# Patient Record
Sex: FEMALE | Race: WHITE | Employment: UNEMPLOYED | URBAN - METROPOLITAN AREA
[De-identification: names, ages, dates, MRNs, and addresses within clinical notes are randomized per-mention and may not be internally consistent; named-entity substitution may affect disease eponyms.]

---

## 2018-10-08 ENCOUNTER — OFFICE VISIT (OUTPATIENT)
Dept: FAMILY MEDICINE CLINIC | Age: 28
End: 2018-10-08

## 2018-10-08 VITALS
DIASTOLIC BLOOD PRESSURE: 62 MMHG | HEIGHT: 65 IN | RESPIRATION RATE: 16 BRPM | WEIGHT: 189 LBS | SYSTOLIC BLOOD PRESSURE: 118 MMHG | BODY MASS INDEX: 31.49 KG/M2 | HEART RATE: 68 BPM | OXYGEN SATURATION: 98 % | TEMPERATURE: 98.5 F

## 2018-10-08 DIAGNOSIS — F33.0 MILD EPISODE OF RECURRENT MAJOR DEPRESSIVE DISORDER (HCC): ICD-10-CM

## 2018-10-08 DIAGNOSIS — Z00.00 ROUTINE GENERAL MEDICAL EXAMINATION AT A HEALTH CARE FACILITY: Primary | ICD-10-CM

## 2018-10-08 DIAGNOSIS — Z13.220 SCREENING, LIPID: ICD-10-CM

## 2018-10-08 DIAGNOSIS — E66.9 OBESITY (BMI 30-39.9): ICD-10-CM

## 2018-10-08 NOTE — PATIENT INSTRUCTIONS

## 2018-10-08 NOTE — MR AVS SNAPSHOT
[address] [facility] 
 
 
 [address] [facility] [address] 
[phone] Patient: Lucio Asif MRN: [id number] [id number] Visit Information Date & Time Provider Department Dept. Phone Encounter #  
 10/8/2018  9:00 AM Alia Taylor  [facility] [phone] [id number] Follow-up Instructions Return in about 1 year (around 10/8/2019) for Complete Physical.  
  
Upcoming Health Maintenance Date Due DTaP/Tdap/Td series (1 - Tdap) 3/8/2011 PAP AKA CERVICAL CYTOLOGY 3/8/2011 Influenza Age 5 to Adult 8/1/2018 Allergies as of 10/8/2018  Review Complete On: 10/8/2018 By: Alia Taylor NP Severity Noted Reaction Type Reactions Ciprofloxacin Low 10/08/2018    Rash Pcn [Penicillins] Low 10/08/2018    Itching Current Immunizations  Reviewed on 10/8/2018 Name Date Influenza Vaccine 10/1/2018 Reviewed by Alia Taylor NP on 10/8/2018 at  9:45 AM  
You Were Diagnosed With   
  
 Codes Comments Routine general medical examination at a health care facility    -  Primary ICD-10-CM: Z00.00 ICD-9-CM: V70.0 Screening, lipid     ICD-10-CM: V91.945 ICD-9-CM: V77.91 Vitals BP Pulse Temp Resp Height(growth percentile) Weight(growth percentile)  
 118/62 (BP 1 Location: Left arm, BP Patient Position: Sitting) 68 98.5 °F (36.9 °C) (Oral) 16 5' 5\" (1.651 m) 189 lb (85.7 kg) LMP SpO2 BMI OB Status Smoking Status 09/20/2018 (Exact Date) 98% 31.45 kg/m2 Having regular periods Former Smoker BMI and BSA Data Body Mass Index Body Surface Area  
 31.45 kg/m 2 1.98 m 2 Your Updated Medication List  
  
   
This list is accurate as of 10/8/18  9:52 AM.  Always use your most recent med list.  
  
  
  
  
 CENTRUM PRONUTRIENTS OMEGA-3 332. 5-100-200 mg Cap Generic drug:  omega-3s-dha-epa-fish oil Take  by mouth. PRENATAL #2 PO Take  by mouth. We Performed the Following CBC WITH AUTOMATED DIFF [11654 CPT(R)] LIPID PANEL [39381 CPT(R)] METABOLIC PANEL, COMPREHENSIVE [94393 CPT(R)] Follow-up Instructions Return in about 1 year (around 10/8/2019) for Complete Physical.  
  
  
Patient Instructions Well Visit, Ages 25 to 48: Care Instructions Your Care Instructions Physical exams can help you stay healthy. Your doctor has checked your overall health and may have suggested ways to take good care of yourself. He or she also may have recommended tests. At home, you can help prevent illness with healthy eating, regular exercise, and other steps. Follow-up care is a key part of your treatment and safety. Be sure to make and go to all appointments, and call your doctor if you are having problems. It's also a good idea to know your test results and keep a list of the medicines you take. How can you care for yourself at home? · Reach and stay at a healthy weight. This will lower your risk for many problems, such as obesity, diabetes, heart disease, and high blood pressure. · Get at least 30 minutes of physical activity on most days of the week. Walking is a good choice. You also may want to do other activities, such as running, swimming, cycling, or playing tennis or team sports. Discuss any changes in your exercise program with your doctor. · Do not smoke or allow others to smoke around you. If you need help quitting, talk to your doctor about stop-smoking programs and medicines. These can increase your chances of quitting for good. · Talk to your doctor about whether you have any risk factors for sexually transmitted infections (STIs). Having one sex partner (who does not have STIs and does not have sex with anyone else) is a good way to avoid these infections. · Use birth control if you do not want to have children at this time. Talk with your doctor about the choices available and what might be best for you. · Protect your skin from too much sun. When you're outdoors from 10 a.m. to 4 p.m., stay in the shade or cover up with clothing and a hat with a wide brim. Wear sunglasses that block UV rays. Even when it's cloudy, put broad-spectrum sunscreen (SPF 30 or higher) on any exposed skin. · See a dentist one or two times a year for checkups and to have your teeth cleaned. · Wear a seat belt in the car. · Drink alcohol in moderation, if at all. That means no more than 2 drinks a day for men and 1 drink a day for women. Follow your doctor's advice about when to have certain tests. These tests can spot problems early. For everyone · Cholesterol. Have the fat (cholesterol) in your blood tested after age 21. Your doctor will tell you how often to have this done based on your age, family history, or other things that can increase your risk for heart disease. · Blood pressure. Have your blood pressure checked during a routine doctor visit. Your doctor will tell you how often to check your blood pressure based on your age, your blood pressure results, and other factors. · Vision. Talk with your doctor about how often to have a glaucoma test. 
· Diabetes. Ask your doctor whether you should have tests for diabetes. · Colon cancer. Have a test for colon cancer at age 48. You may have one of several tests. If you are younger than 48, you may need a test earlier if you have any risk factors. Risk factors include whether you already had a precancerous polyp removed from your colon or whether your parent, brother, sister, or child has had colon cancer. For women · Breast exam and mammogram. Talk to your doctor about when you should have a clinical breast exam and a mammogram. Medical experts differ on whether and how often women under 50 should have these tests. Your doctor can help you decide what is right for you. · Pap test and pelvic exam. Begin Pap tests at age 24.  A Pap test is the best way to find cervical cancer. The test often is part of a pelvic exam. Ask how often to have this test. 
· Tests for sexually transmitted infections (STIs). Ask whether you should have tests for STIs. You may be at risk if you have sex with more than one person, especially if your partners do not wear condoms. For men · Tests for sexually transmitted infections (STIs). Ask whether you should have tests for STIs. You may be at risk if you have sex with more than one person, especially if you do not wear a condom. · Testicular cancer exam. Ask your doctor whether you should check your testicles regularly. · Prostate exam. Talk to your doctor about whether you should have a blood test (called a PSA test) for prostate cancer. Experts differ on whether and when men should have this test. Some experts suggest it if you are older than 39 and are -American or have a father or brother who got prostate cancer when he was younger than 72. When should you call for help? Watch closely for changes in your health, and be sure to contact your doctor if you have any problems or symptoms that concern you. Where can you learn more? Go to http://haven-aliza.info/. Enter P072 in the search box to learn more about \"Well Visit, Ages 25 to 48: Care Instructions. \" Current as of: March 29, 2018 Content Version: 11.8 © 0886-5221 Healthwise, Incorporated. Care instructions adapted under license by [employer] (which disclaims liability or warranty for this information). If you have questions about a medical condition or this instruction, always ask your healthcare professional. [place] [address] any warranty or liability for your use of this information. Introducing [facility] [place] [facility]! [facility] introduces Hopela patient portal. Now you can access parts of your medical record, email your doctor's office, and request medication refills online. 1. In your internet browser, go to [email or web address]://[email or web address] [email or web address][employer]t 2. Click on the First Time User? Click Here link in the Sign In box. You will see the New Member Sign Up page. 3. Enter your Widbook Access Code exactly as it appears below. You will not need to use this code after youve completed the sign-up process. If you do not sign up before the expiration date, you must request a new code. · Widbook Access Code: [email or web address] Expires: 1/6/2019  9:09 AM 
 
4. Enter the last four digits of your Social Security Number (xxxx) and Date of Birth (mm/dd/yyyy) as indicated and click Submit. You will be taken to the next sign-up page. 5. Create a Dittot ID. This will be your Widbook login ID and cannot be changed, so think of one that is secure and easy to remember. 6. Create a Widbook password. You can change your password at any time. 7. Enter your Password Reset Question and Answer. This can be used at a later time if you forget your password. 8. Enter your e-mail address. You will receive e-mail notification when new information is available in [address] 9. Click Sign Up. You can now view and download portions of your medical record. 10. Click the Download Summary menu link to download a portable copy of your medical information. If you have questions, please visit the Frequently Asked Questions section of the Widbook website. Remember, Widbook is NOT to be used for urgent needs. For medical emergencies, dial 911. Now available from your iPhone and Android! Please provide this summary of care documentation to your next provider. Your primary care clinician is listed as Sarita Amezquita. If you have any questions after today's visit, please call [phone].

## 2018-10-08 NOTE — PROGRESS NOTES
Assessment/ Plan:  
Full age appropriate History and Physical exam as well as health care maintenance  performed and discussed today. Risk factor modification discussed today includes safe sex practices, healthy diet and exercise, and seat belt use. Continue current medications. Diagnoses and all orders for this visit: 1. Routine general medical examination at a health care facility 
-     CBC WITH AUTOMATED DIFF 
-     METABOLIC PANEL, COMPREHENSIVE She is up to date on routine care. She will complete fasting labs. 2. Screening, lipid -     LIPID PANEL 3. Mild episode of recurrent major depressive disorder ([address] Currently coping and plans to become pregnant soon. Continue to encourage counseling. She is agreeable and will return if symptoms worsen. 4. Obesity (BMI 30-39. 9) I have reviewed/discussed the above normal BMI with the patient. I have recommended the following interventions: dietary management education, guidance, and counseling, encourage exercise, monitor weight and prescribed dietary intake. Given written instructions as well. Follow-up Disposition: 
Return in about 1 year (around 10/8/2019) for Complete Physical.  
 
Discussed expected course/resolution/complications of diagnosis in detail with patient.   
Medication risks/benefits/costs/interactions/alternatives discussed with patient.   
Pt was given after visit summary which includes diagnoses, current medications & vitals. Pt expressed understanding with the diagnosis and plan HPI:  
Jcarlos Marti is a 29 y.o. female who presents for annual exam. 
 
Reports intermittent chest pain long-standing. Not associated with shortness of breath. Previous ER visit was negative for cardiac etiology. Thought to be anxiety related. Not currently undergoing cardiology follow up or treatment for anxiety. Depression Patient complains of depression.  She complains of depressed mood, fatigue, feelings of worthlessness/guilt, difficulty concentrating and hopelessness. Onset was approximately 5 years ago, gradually worsening since that time. She denies current suicidal and homicidal plan or intent. Family history significant for nothing. Possible organic causes contributing are: none. Risk factors: negative life event recently found out mother used sperm donor for her birth. Previous treatment includes Zoloft, Lexapro, Viibyrd (worked the best) and individual therapy. She complains of the following side effects from the treatment: none. Not interested in restarting therapy for mood as she is attempting to become pregnant at this time. She is followed by Dr. Jaylan Sinha, ob/gyn and is up to date on routine pap smears. Current Outpatient Prescriptions Medication Sig Dispense Refill  omega-3s-dha-epa-fish oil (CENTRUM PRONUTRIGloss48 OMEGA-3) 332.5-100-200 mg cap Take  by mouth.  prenatal vit calc,iron,folic (PRENATAL #2 PO) Take  by mouth. Allergies Allergen Reactions  Ciprofloxacin Rash  Pcn [Penicillins] Itching Patient Active Problem List  
Diagnosis Code  Mild episode of recurrent major depressive disorder ([facility] [address] F33.0  Obesity (BMI 30-39. 9) E66.9 No past medical history on file. Past Surgical History:  
Procedure Laterality Date  HX OTHER SURGICAL  01/2017  
 gallstones removal  
  
Patient's last menstrual period was 09/20/2018 (exact date). Family History Problem Relation Age of Onset  Depression Mother  Depression Brother Social History Social History  Marital status:  Spouse name: N/A  
 Number of children: N/A  
 Years of education: N/A Occupational History  Not on file. Social History Main Topics  Smoking status: Former Smoker Years: 5.00 Quit date: 06/2016  Smokeless tobacco: Never Used  Alcohol use Yes Comment: 3 weekly  Drug use: Yes Special: Marijuana Comment: years ago  Sexual activity: Yes  
  Partners: Male Birth control/ protection: None Other Topics Concern  Not on file Social History Narrative Parents are . Found out late in life mother used donor sperm for pregnancy. ROS:  
 
Review of Systems Constitutional: Negative for fever, malaise/fatigue and weight loss. HENT: Negative for hearing loss. Eyes: Negative for blurred vision and pain. Respiratory: Negative for cough and shortness of breath. Cardiovascular: Negative for chest pain, palpitations and leg swelling. Gastrointestinal: Negative for abdominal pain, blood in stool, constipation, diarrhea and melena. Genitourinary: Negative for dysuria and hematuria. Musculoskeletal: Negative for joint pain. Skin: Negative for rash. Neurological: Negative for headaches. Psychiatric/Behavioral: Positive for depression. The patient is nervous/anxious. The patient does not have insomnia. Physical Exam:  
 
Visit Vitals  /62 (BP 1 Location: Left arm, BP Patient Position: Sitting)  Pulse 68  Temp 98.5 °F (36.9 °C) (Oral)  Resp 16  
 Ht 5' 5\" (1.651 m)  Wt 189 lb (85.7 kg)  LMP 09/20/2018 (Exact Date)  SpO2 98%  BMI 31.45 kg/m2 Nursing Documentation and Vital Signs Reviewed. Physical Exam  
Constitutional: No distress. HENT:  
Right Ear: No drainage. Tympanic membrane is not injected, not erythematous and not bulging. No middle ear effusion. Left Ear: No drainage. Tympanic membrane is not injected, not erythematous and not bulging. No middle ear effusion. Nose: No mucosal edema or rhinorrhea. Mouth/Throat: Normal dentition. No oropharyngeal exudate, posterior oropharyngeal erythema or tonsillar abscesses. Eyes: Pupils are equal, round, and reactive to light. Neck: No JVD present. Carotid bruit is not present. No tracheal deviation present. No thyroid mass and no thyromegaly present. Cardiovascular: S1 normal and S2 normal.  Exam reveals no gallop and no friction rub. No murmur heard. Pulses: 
     Dorsalis pedis pulses are 2+ on the right side, and 2+ on the left side. Posterior tibial pulses are 2+ on the right side, and 2+ on the left side. Pulmonary/Chest: Breath sounds normal. She has no wheezes. Abdominal: Soft. Bowel sounds are normal. She exhibits no distension and no mass. There is no hepatosplenomegaly. There is no tenderness. Lymphadenopathy:  
  She has no cervical adenopathy. She has no axillary adenopathy. Neurological: She has normal motor skills, normal sensation and normal strength. No cranial nerve deficit. Gait normal.  
Skin: Skin is warm and dry.   
Psychiatric: Mood, memory, affect and judgment normal.

## 2018-10-08 NOTE — PROGRESS NOTES
Chief Complaint Patient presents with [facility]  Complete Physical  
 
1. Have you been to the ER, urgent care clinic since your last visit? Hospitalized since your last visit? No 
 
2. Have you seen or consulted any other health care providers outside of the [address] [place] [address] since your last visit? Include any pap smears or colon screening.  No

## 2018-10-09 LAB
ALBUMIN SERPL-MCNC: 4.3 G/DL (ref 3.5–5.5)
ALBUMIN/GLOB SERPL: 1.4 {RATIO} (ref 1.2–2.2)
ALP SERPL-CCNC: 57 IU/L (ref 39–117)
ALT SERPL-CCNC: 12 IU/L (ref 0–32)
AST SERPL-CCNC: 16 IU/L (ref 0–40)
BASOPHILS # BLD AUTO: 0 X10E3/UL (ref 0–0.2)
BASOPHILS NFR BLD AUTO: 0 %
BILIRUB SERPL-MCNC: 0.2 MG/DL (ref 0–1.2)
BUN SERPL-MCNC: 14 MG/DL (ref 6–20)
BUN/CREAT SERPL: 22 (ref 9–23)
CALCIUM SERPL-MCNC: 8.9 MG/DL (ref 8.7–10.2)
CHLORIDE SERPL-SCNC: 104 MMOL/L (ref 96–106)
CHOLEST SERPL-MCNC: 152 MG/DL (ref 100–199)
CO2 SERPL-SCNC: 22 MMOL/L (ref 20–29)
CREAT SERPL-MCNC: 0.65 MG/DL (ref 0.57–1)
EOSINOPHIL # BLD AUTO: 0.1 X10E3/UL (ref 0–0.4)
EOSINOPHIL NFR BLD AUTO: 1 %
ERYTHROCYTE [DISTWIDTH] IN BLOOD BY AUTOMATED COUNT: 13 % (ref 12.3–15.4)
GLOBULIN SER CALC-MCNC: 3.1 G/DL (ref 1.5–4.5)
GLUCOSE SERPL-MCNC: 83 MG/DL (ref 65–99)
HCT VFR BLD AUTO: 39.1 % (ref 34–46.6)
HDLC SERPL-MCNC: 55 MG/DL
HGB BLD-MCNC: 13.5 G/DL (ref 11.1–15.9)
IMM GRANULOCYTES # BLD: 0 X10E3/UL (ref 0–0.1)
IMM GRANULOCYTES NFR BLD: 0 %
INTERPRETATION, 910389: NORMAL
LDLC SERPL CALC-MCNC: 91 MG/DL (ref 0–99)
LYMPHOCYTES # BLD AUTO: 2.1 X10E3/UL (ref 0.7–3.1)
LYMPHOCYTES NFR BLD AUTO: 33 %
MCH RBC QN AUTO: 31.2 PG (ref 26.6–33)
MCHC RBC AUTO-ENTMCNC: 34.5 G/DL (ref 31.5–35.7)
MCV RBC AUTO: 90 FL (ref 79–97)
MONOCYTES # BLD AUTO: 0.6 X10E3/UL (ref 0.1–0.9)
MONOCYTES NFR BLD AUTO: 9 %
NEUTROPHILS # BLD AUTO: 3.5 X10E3/UL (ref 1.4–7)
NEUTROPHILS NFR BLD AUTO: 57 %
PLATELET # BLD AUTO: 226 X10E3/UL (ref 150–379)
POTASSIUM SERPL-SCNC: 4.8 MMOL/L (ref 3.5–5.2)
PROT SERPL-MCNC: 7.4 G/DL (ref 6–8.5)
RBC # BLD AUTO: 4.33 X10E6/UL (ref 3.77–5.28)
SODIUM SERPL-SCNC: 140 MMOL/L (ref 134–144)
TRIGL SERPL-MCNC: 31 MG/DL (ref 0–149)
VLDLC SERPL CALC-MCNC: 6 MG/DL (ref 5–40)
WBC # BLD AUTO: 6.4 X10E3/UL (ref 3.4–10.8)

## 2018-10-24 LAB — PAP SMEAR, EXTERNAL: NORMAL

## 2018-11-17 LAB
T. PALLIDUM, EXTERNAL: NEGATIVE
URINALYSIS, EXTERNAL: NEGATIVE

## 2018-12-17 LAB
ANTIBODY SCREEN, EXTERNAL: NEGATIVE
CHLAMYDIA, EXTERNAL: NEGATIVE
HBSAG, EXTERNAL: NEGATIVE
HCT, EXTERNAL: 38.7
HGB, EXTERNAL: 12.9
HIV, EXTERNAL: NORMAL
N. GONORRHEA, EXTERNAL: NEGATIVE
PLATELET CNT,   EXTERNAL: 255
RUBELLA, EXTERNAL: NORMAL
TYPE, ABO & RH, EXTERNAL: NORMAL

## 2019-02-01 LAB — AFPT, MATERNAL, EXTERNAL: NEGATIVE

## 2019-04-01 ENCOUNTER — TELEPHONE (OUTPATIENT)
Dept: OBGYN CLINIC | Age: 29
End: 2019-04-01

## 2019-04-01 NOTE — TELEPHONE ENCOUNTER
Patient of [facility] transfer.   Patient would like to speak with the nurse of [facility] to discuss what to expect at her 24-25 week visit with DT.    (FYI- I do not see records from former OB as noted in scheduling notes that they were being faxed)

## 2019-04-01 NOTE — TELEPHONE ENCOUNTER
Spoke with patient regarding upcoming appointment on 4/8. Inquiring if GDS will be done. Advised patient it can be done anywhere from 24-28 wks but we will likely do at following visit. Patient verbalized understanding.

## 2019-04-04 ENCOUNTER — HOSPITAL ENCOUNTER (EMERGENCY)
Age: 29
Discharge: HOME | End: 2019-04-04
Attending: EMERGENCY MEDICINE
Payer: COMMERCIAL

## 2019-04-04 VITALS
RESPIRATION RATE: 17 BRPM | BODY MASS INDEX: 33.72 KG/M2 | OXYGEN SATURATION: 97 % | WEIGHT: 202.38 LBS | DIASTOLIC BLOOD PRESSURE: 56 MMHG | TEMPERATURE: 97.9 F | HEIGHT: 65 IN | SYSTOLIC BLOOD PRESSURE: 108 MMHG | HEART RATE: 79 BPM

## 2019-04-04 DIAGNOSIS — E86.0 MILD DEHYDRATION: Primary | ICD-10-CM

## 2019-04-04 DIAGNOSIS — O26.892 PREGNANCY HEADACHE IN SECOND TRIMESTER: ICD-10-CM

## 2019-04-04 DIAGNOSIS — R51.9 PREGNANCY HEADACHE IN SECOND TRIMESTER: ICD-10-CM

## 2019-04-04 LAB
ALBUMIN SERPL-MCNC: 3 G/DL (ref 3.5–5)
ALBUMIN/GLOB SERPL: 0.7 {RATIO} (ref 1.1–2.2)
ALP SERPL-CCNC: 65 U/L (ref 45–117)
ALT SERPL-CCNC: 17 U/L (ref 12–78)
ANION GAP SERPL CALC-SCNC: 7 MMOL/L (ref 5–15)
APPEARANCE UR: CLEAR
AST SERPL-CCNC: 15 U/L (ref 15–37)
BACTERIA URNS QL MICRO: NEGATIVE /HPF
BASOPHILS # BLD: 0 K/UL (ref 0–0.1)
BASOPHILS NFR BLD: 0 % (ref 0–1)
BILIRUB SERPL-MCNC: 0.1 MG/DL (ref 0.2–1)
BILIRUB UR QL: NEGATIVE
BUN SERPL-MCNC: 8 MG/DL (ref 6–20)
BUN/CREAT SERPL: 12 (ref 12–20)
CALCIUM SERPL-MCNC: 9.1 MG/DL (ref 8.5–10.1)
CHLORIDE SERPL-SCNC: 108 MMOL/L (ref 97–108)
CO2 SERPL-SCNC: 23 MMOL/L (ref 21–32)
COLOR UR: NORMAL
COMMENT, HOLDF: NORMAL
CREAT SERPL-MCNC: 0.66 MG/DL (ref 0.55–1.02)
DIFFERENTIAL METHOD BLD: ABNORMAL
EOSINOPHIL # BLD: 0.1 K/UL (ref 0–0.4)
EOSINOPHIL NFR BLD: 0 % (ref 0–7)
EPITH CASTS URNS QL MICRO: NORMAL /LPF
ERYTHROCYTE [DISTWIDTH] IN BLOOD BY AUTOMATED COUNT: 12.9 % (ref 11.5–14.5)
GLOBULIN SER CALC-MCNC: 4.4 G/DL (ref 2–4)
GLUCOSE SERPL-MCNC: 85 MG/DL (ref 65–100)
GLUCOSE UR STRIP.AUTO-MCNC: NEGATIVE MG/DL
HCT VFR BLD AUTO: 35.2 % (ref 35–47)
HGB BLD-MCNC: 12.1 G/DL (ref 11.5–16)
HGB UR QL STRIP: NEGATIVE
HYALINE CASTS URNS QL MICRO: NORMAL /LPF (ref 0–5)
IMM GRANULOCYTES # BLD AUTO: 0.1 K/UL (ref 0–0.04)
IMM GRANULOCYTES NFR BLD AUTO: 1 % (ref 0–0.5)
KETONES UR QL STRIP.AUTO: NEGATIVE MG/DL
LEUKOCYTE ESTERASE UR QL STRIP.AUTO: NEGATIVE
LYMPHOCYTES # BLD: 2.3 K/UL (ref 0.8–3.5)
LYMPHOCYTES NFR BLD: 17 % (ref 12–49)
MCH RBC QN AUTO: 32.3 PG (ref 26–34)
MCHC RBC AUTO-ENTMCNC: 34.4 G/DL (ref 30–36.5)
MCV RBC AUTO: 93.9 FL (ref 80–99)
MONOCYTES # BLD: 0.8 K/UL (ref 0–1)
MONOCYTES NFR BLD: 6 % (ref 5–13)
NEUTS SEG # BLD: 10 K/UL (ref 1.8–8)
NEUTS SEG NFR BLD: 76 % (ref 32–75)
NITRITE UR QL STRIP.AUTO: NEGATIVE
NRBC # BLD: 0 K/UL (ref 0–0.01)
NRBC BLD-RTO: 0 PER 100 WBC
PH UR STRIP: 6 [PH] (ref 5–8)
PLATELET # BLD AUTO: 204 K/UL (ref 150–400)
PMV BLD AUTO: 9.6 FL (ref 8.9–12.9)
POTASSIUM SERPL-SCNC: 3.6 MMOL/L (ref 3.5–5.1)
PROT SERPL-MCNC: 7.4 G/DL (ref 6.4–8.2)
PROT UR STRIP-MCNC: NEGATIVE MG/DL
RBC # BLD AUTO: 3.75 M/UL (ref 3.8–5.2)
RBC #/AREA URNS HPF: NORMAL /HPF (ref 0–5)
SAMPLES BEING HELD,HOLD: NORMAL
SODIUM SERPL-SCNC: 138 MMOL/L (ref 136–145)
SP GR UR REFRACTOMETRY: 1.01 (ref 1–1.03)
UR CULT HOLD, URHOLD: NORMAL
UROBILINOGEN UR QL STRIP.AUTO: 0.2 EU/DL (ref 0.2–1)
WBC # BLD AUTO: 13.3 K/UL (ref 3.6–11)
WBC URNS QL MICRO: NORMAL /HPF (ref 0–4)

## 2019-04-04 PROCEDURE — 81001 URINALYSIS AUTO W/SCOPE: CPT

## 2019-04-04 PROCEDURE — 74011250636 HC RX REV CODE- 250/636: Performed by: EMERGENCY MEDICINE

## 2019-04-04 PROCEDURE — 85025 COMPLETE CBC W/AUTO DIFF WBC: CPT

## 2019-04-04 PROCEDURE — 99285 EMERGENCY DEPT VISIT HI MDM: CPT

## 2019-04-04 PROCEDURE — 36415 COLL VENOUS BLD VENIPUNCTURE: CPT

## 2019-04-04 PROCEDURE — 80053 COMPREHEN METABOLIC PANEL: CPT

## 2019-04-04 PROCEDURE — 74011250637 HC RX REV CODE- 250/637: Performed by: EMERGENCY MEDICINE

## 2019-04-04 PROCEDURE — 96360 HYDRATION IV INFUSION INIT: CPT

## 2019-04-04 PROCEDURE — 93005 ELECTROCARDIOGRAM TRACING: CPT

## 2019-04-04 RX ORDER — ACETAMINOPHEN 500 MG
1000 TABLET ORAL
Status: COMPLETED | OUTPATIENT
Start: 2019-04-04 | End: 2019-04-04

## 2019-04-04 RX ADMIN — SODIUM CHLORIDE 1000 ML: 900 INJECTION, SOLUTION INTRAVENOUS at 19:24

## 2019-04-04 RX ADMIN — ACETAMINOPHEN 1000 MG: 500 TABLET ORAL at 19:24

## 2019-04-04 NOTE — ED PROVIDER NOTES
34 y.o. Female with past medical history significant for depression and anxiety who presents from home via personal vehicle with chief complaint of dyspnea. Pt is 23 weeks pregnant () and reports an episode of dyspnea, palpitations, lightheadedness, and severe dizziness which took place earlier today around 0830 as she was heading into work. Pt states that later on during the day while at work, around  her vision went \"fuzzy\", and she then started to experience a headache (generalized) and neck pain. Pt still feels SOB and headache and notes exacerbation of symptoms when lying down and upon exertion. Pt denies taking any medications prior to arrival. 
 
There are no other acute medical concerns at this time. Surgical hx - ACL reconstruction, gallstone removal  
Social hx - Tobacco use: former smoker (quit in '16), Alcohol Use: 3 drinks per week prior to pregnancy PCP: Joellen Falcon NP Note written by Beatriz Butler, as dictated by Eladio Mckeon MD 6:25 PM. The history is provided by the patient. No  was used. Past Medical History:  
Diagnosis Date  Headache  Psychiatric disorder   
 depression anxiety Past Surgical History:  
Procedure Laterality Date  HX ORTHOPAEDIC    
 rt acl reconstruc  HX OTHER SURGICAL  2017  
 gallstones removal  
 
   
Family History:  
Problem Relation Age of Onset  Depression Mother  Depression Brother Social History Socioeconomic History  Marital status:  Spouse name: Not on file  Number of children: Not on file  Years of education: Not on file  Highest education level: Not on file Occupational History  Not on file Social Needs  Financial resource strain: Not on file  Food insecurity:  
  Worry: Not on file Inability: Not on file  Transportation needs:  
  Medical: Not on file Non-medical: Not on file Tobacco Use  
  Smoking status: Former Smoker Years: 5.00 Last attempt to quit: 2016 Years since quittin.8  Smokeless tobacco: Never Used Substance and Sexual Activity  Alcohol use: Yes Comment: 3 weekly  Drug use: Yes Types: Marijuana Comment: years ago  Sexual activity: Yes  
  Partners: Male Birth control/protection: None Lifestyle  Physical activity:  
  Days per week: Not on file Minutes per session: Not on file  Stress: Not on file Relationships  Social connections:  
  Talks on phone: Not on file Gets together: Not on file Attends Alevism service: Not on file Active member of club or organization: Not on file Attends meetings of clubs or organizations: Not on file Relationship status: Not on file  Intimate partner violence:  
  Fear of current or ex partner: Not on file Emotionally abused: Not on file Physically abused: Not on file Forced sexual activity: Not on file Other Topics Concern  Not on file Social History Narrative Parents are . Found out late in life mother used donor sperm for pregnancy. ALLERGIES: Ciprofloxacin and Pcn [penicillins] Review of Systems Eyes: Positive for visual disturbance. Respiratory: Positive for shortness of breath. Cardiovascular: Positive for palpitations. Musculoskeletal: Positive for neck pain. Neurological: Positive for dizziness, light-headedness and headaches. All other systems reviewed and are negative. Vitals:  
 19 1703 19 1749 19 1830 19 1900 BP:  128/77 128/64 115/56 Pulse: 80 94 Resp:  16 Temp:  98.3 °F (36.8 °C) SpO2: 100% 100% 100% 100% Weight:  91.8 kg (202 lb 6.1 oz) Height:  5' 5\" (1.651 m) Physical Exam  
Constitutional: She appears well-developed and well-nourished. No distress. HENT:  
Head: Normocephalic and atraumatic. Mouth/Throat: Oropharynx is clear and moist.  
Eyes: Pupils are equal, round, and reactive to light. Conjunctivae and EOM are normal.  
Neck: Neck supple. Cardiovascular: Normal rate, regular rhythm and normal heart sounds. Exam reveals no gallop and no friction rub. No murmur heard. Pulmonary/Chest: Effort normal and breath sounds normal. No respiratory distress. She has no wheezes. She has no rales. She exhibits no tenderness. Abdominal: She exhibits no distension. Gravid abdomen with fundus below umbilicus, nontender Musculoskeletal: Normal range of motion. She exhibits no deformity. Neurological: She is alert. No cranial nerve deficit. Skin: Skin is warm and dry. Psychiatric: She has a normal mood and affect. Her behavior is normal.  
Nursing note and vitals reviewed. Note written by Beatriz Lovell, as dictated by Marbella Clancy MD 6:25 PM 
 
MDM 
  
34 y.o. female presents with fatigue, headaches, blurry vision with shortness of breath on exertion earlier today. Provided supportive care measures with tylenol and fluids pending lab workup and feeling much better, able to eat a zander johns sandwich awaiting disposition. LFTs normal, normal platelets, no proteinuria/glucosuria/bacteruria so doubt pre-E or UTI. No s/s PE or DVT. Suspect she has had some ongoing fatigue and dehydration related symptoms during uncomplicated pregnancy. Bedside US of infant is reassuring. Recommended f/u with PCP and OB for re-evaluation and monitoring. Procedures ED EKG interpretation: 
Rhythm: normal sinus rhythm; Rate (approx.): 99; ; otherwise normal 
 
Note written by Beatriz Lovell, as dictated by Marbella Clancy MD 5:47 PM

## 2019-04-04 NOTE — ED TRIAGE NOTES
Around 8:30am she was walking to her office and started to have rapid HR and SOB. She is 24 weeeks pregnant. This resolved but the HR stayed in the 120s for a while. She still feels slightly short of breath. Started with a headache at 3:30p.

## 2019-04-04 NOTE — ED NOTES
RN obtained fetal heart tones @ 156. Dr Keisha Foote at bedside, performed fetal ultrasound. Fetal activity noted on monitor.

## 2019-04-05 LAB
ATRIAL RATE: 99 BPM
CALCULATED P AXIS, ECG09: 48 DEGREES
CALCULATED R AXIS, ECG10: -20 DEGREES
CALCULATED T AXIS, ECG11: 17 DEGREES
DIAGNOSIS, 93000: NORMAL
P-R INTERVAL, ECG05: 150 MS
Q-T INTERVAL, ECG07: 380 MS
QRS DURATION, ECG06: 86 MS
QTC CALCULATION (BEZET), ECG08: 487 MS
VENTRICULAR RATE, ECG03: 99 BPM

## 2019-04-05 NOTE — ED NOTES
Discharge instructions given to patient by MD and nurse. Pt to follow up with PCP and OB. Pt verbalizes understanding. IV d/c. Pt ambulated off of unit in no signs of distress.

## 2019-04-08 ENCOUNTER — OFFICE VISIT (OUTPATIENT)
Dept: OBGYN CLINIC | Age: 29
End: 2019-04-08

## 2019-04-08 VITALS
DIASTOLIC BLOOD PRESSURE: 68 MMHG | SYSTOLIC BLOOD PRESSURE: 110 MMHG | WEIGHT: 202.6 LBS | HEIGHT: 65 IN | BODY MASS INDEX: 33.76 KG/M2

## 2019-04-08 DIAGNOSIS — Z3A.24 24 WEEKS GESTATION OF PREGNANCY: ICD-10-CM

## 2019-04-08 NOTE — PROGRESS NOTES
Obstetrical Transfer Note    Ms. Lefty Horner is a ,  34 y.o. female [facility] Patient's last menstrual period was 2018 (exact date). .  She is transferring to our practice after receiving OB care at [address] @ [address]. Desires midwife delivery. Does not have records with her today. Will send request.  Seen in ED  for fatigue, headaches, blurry vision with shortness of breath on exertion diagnosed with Dehydration and given IVF    FHR: 140  Mvmt: Present    EDC dating:  Her due date has been determined by LMP and first trimester US. Her prenatal care up to this point has been notable for n/a. Relevant past pregnancy history:  She has the following pregnancy history: Her last pregnancy was N/A uncomplicated. She has no history of  delivery. Relevant past medical history:(relevant to this pregnancy): Anxiety and Depression, HSV. Pap/Occupational history:  Last pap smear: 2018 per patient Results: Normal      Substance history: negative for alcohol, tobacco and street drugs. Positive for nothing. Exposure history: There is/are no indoor cat/s in the home. The patient was instructed to not change the cat litter. She admits close contact with children on a regular basis. She has had chicken pox or the vaccine in the past.   Patient denies issues with domestic violence. Genetic Screening/Teratology Counseling: (Includes patient, baby's father, or anyone in either family with:)  3.  Patient's age >/= 28 at EDC?--no  2. Thalassemia ([facility], [facility], [address], or  background): MCV<80?--no.     3.  Neural tube defect (meningomyelocele, spina bifida, anencephaly)?--no.   4.  Congenital heart defect?--no.  5.  Down syndrome?--no.   6.  Hosea-Sachs ([facility], [facility])?--no.   7.  Canavan's Disease?--no.   8.  Familial Dysautonomia?--no.   9.  Sickle cell disease or trait ()? --no   The patient has not been tested for sickle trait  10. Hemophilia or other blood disorders?--no. 11.  Muscular dystrophy?--no. 12.  Cystic fibrosis?--no. 13.  [place]'s Chorea?--no. 14.  Mental retardation/autism (if yes was person tested for Fragile X)?--no. 15.  Other inherited genetic or chromosomal disorder?--no. 12.  Maternal metabolic disorder (DM, PKU, etc)?--no. 17.  Patient or FOB with a child with a birth defect not listed above?--no.  17a. Patient or FOB with a birth defect themselves?--no. 18.  Recurrent pregnancy loss, or stillbirth?--no. 19.  Any medications since LMP other than prenatal vitamins (include vitamins, supplements, OTC meds, drugs, alcohol)?--no. 20.  Any other genetic/environmental exposure to discuss?--no. Infection History:  1. Lives with someone with TB or TB exposed?--no.   2.  Patient or partner has history of genital herpes?--no.  3.  Rash or viral illness since LMP?--no.    4.  History of STD (GC, CT, HPV, syphilis, HIV)? --no   5. Other: OTHER?       Past Medical History:   Diagnosis Date    Headache     Herpes     Type 1--genital    Panic disorder     Psychiatric disorder     depression anxiety     Past Surgical History:   Procedure Laterality Date    HX ORTHOPAEDIC  2009    rt acl reconstruc    HX OTHER SURGICAL  2017    gallstones removal     Social History     Occupational History    Not on file   Tobacco Use    Smoking status: Former Smoker     Years: 5.00     Last attempt to quit: 2016     Years since quittin.8    Smokeless tobacco: Never Used   Substance and Sexual Activity    Alcohol use: Not Currently    Drug use: Yes     Types: Marijuana     Comment: years ago    Sexual activity: Yes     Partners: Male     Birth control/protection: None     Family History   Problem Relation Age of Onset    Depression Mother     Depression Brother      OB History    Para Term  AB Living   1             SAB TAB Ectopic Molar Multiple Live Births                    # Outcome Date GA Lbr Brian/2nd Weight Sex Delivery Anes PTL Lv   1 Current              Allergies   Allergen Reactions    Ciprofloxacin Rash    Pcn [Penicillins] Itching     Prior to Admission medications    Medication Sig Start Date End Date Taking? Authorizing Provider   prenatal vit calc,iron,folic (PRENATAL #2 PO) Take  by mouth. Yes Provider, Historical        Review of Systems: History obtained from the patient  Constitutional: negative for weight loss, fever, night sweats  HEENT: negative for hearing loss, earache, congestion, snoring, sore throat  CV: negative for chest pain, palpitations, edema  Resp: negative for cough, shortness of breath, wheezing  Breast: negative for breast lumps, nipple discharge, galactorrhea  GI: negative for change in bowel habits, abdominal pain, black or bloody stools  : negative for frequency, dysuria, hematuria, vaginal discharge  MSK: negative for back pain, joint pain, muscle pain  Skin: negative for itching, rash, hives  Neuro: negative for dizziness, headache, confusion, weakness  Psych: negative for anxiety, depression, change in mood  Heme/lymph: negative for bleeding, bruising, pallor    Objective:  Visit Vitals  /68   Ht 5' 5\" (1.651 m)   Wt 202 lb 9.6 oz (91.9 kg)   LMP 09/20/2018 (Exact Date)   BMI 33.71 kg/m²       Physical Exam:     Constitutional  · Appearance: well-nourished, well developed, alert, in no acute distress    HENT  · Head  · Face: appears normal  · Eyes: appear normal  · Ears: normal  · Mouth: normal  · Lips: no lesions      Chest  · Respiratory Effort: breathing unlabored    Genitourinary  · deferred    Skin  · General Inspection: no rash, no lesions identified    Neurologic/Psychiatric  · Mental Status:  · Orientation: grossly oriented to person, place and time  · Mood and Affect: mood normal, affect appropriate    Assessment:   Intrauterine pregnancy with the following problems identified: Hx HSV, Hx Anxiety and Depression.     Plan:   Patient and her significant other have been welcomed to our practice. Collaborative care with MDs and CNMs have been reviewed; call coverage reviewed and welcoming folder reviewed. All questions have been answered. Handouts given to pt. Recommend starting Zoloft and Valtrex @ 35 wks, pt and spouse agree. Routines and precautions rev. Cornelius Houston.  SHAGUFTA Mendoza/MIRIAM

## 2019-04-08 NOTE — PATIENT INSTRUCTIONS
Weeks 22 to 26 of Your Pregnancy: Care Instructions  Your Care Instructions    As you enter your 7th month of pregnancy at week 26, your baby's lungs are growing stronger and getting ready to breathe. You may notice that your baby responds to the sound of your or your partner's voice. You may also notice that your baby does less turning and twisting and more squirming or jerking. Jerking often means that your baby has the hiccups. Hiccups are perfectly normal and are only temporary. You may want to think about attending a childbirth preparation class. This is also a good time to start thinking about whether you want to have pain medicine during labor. Most pregnant women are tested for gestational diabetes between weeks 25 and 28. Gestational diabetes occurs when your blood sugar level gets too high when you're pregnant. The test is important, because you can have gestational diabetes and not know it. But the condition can cause problems for your baby. Follow-up care is a key part of your treatment and safety. Be sure to make and go to all appointments, and call your doctor if you are having problems. It's also a good idea to know your test results and keep a list of the medicines you take. How can you care for yourself at home? Ease discomfort from your baby's kicking  · Change your position. Sometimes this will cause your baby to change position too. · Take a deep breath while you raise your arm over your head. Then breathe out while you drop your arm. Do Kegel exercises to prevent urine from leaking  · You can do Kegel exercises while you stand or sit. ? Squeeze the same muscles you would use to stop your urine. Your belly and thighs should not move. ? Hold the squeeze for 3 seconds, and then relax for 3 seconds. ? Start with 3 seconds. Then add 1 second each week until you are able to squeeze for 10 seconds. ? Repeat the exercise 10 to 15 times for each session.  Do three or more sessions each day.  Ease or reduce swelling in your feet, ankles, hands, and fingers  · If your fingers are puffy, take off your rings. · Do not eat high-salt foods, such as potato chips. · Prop up your feet on a stool or couch as much as possible. Sleep with pillows under your feet. · Do not stand for long periods of time or wear tight shoes. · Wear support stockings. Where can you learn more? Go to http://haven-aliza.info/. Enter G264 in the search box to learn more about \"Weeks 22 to 26 of Your Pregnancy: Care Instructions. \"  Current as of: September 5, 2018  Content Version: 11.9  © 9431-9140 [employer], [employer]. Care instructions adapted under license by [employer] (which disclaims liability or warranty for this information). If you have questions about a medical condition or this instruction, always ask your healthcare professional. [place] [address] any warranty or liability for your use of this information.

## 2019-04-17 DIAGNOSIS — Z3A.24 24 WEEKS GESTATION OF PREGNANCY: ICD-10-CM

## 2019-05-10 ENCOUNTER — ROUTINE PRENATAL (OUTPATIENT)
Dept: OBGYN CLINIC | Age: 29
End: 2019-05-10

## 2019-05-10 VITALS
BODY MASS INDEX: 34.49 KG/M2 | SYSTOLIC BLOOD PRESSURE: 114 MMHG | WEIGHT: 207 LBS | HEIGHT: 65 IN | DIASTOLIC BLOOD PRESSURE: 74 MMHG

## 2019-05-10 DIAGNOSIS — Z3A.24 24 WEEKS GESTATION OF PREGNANCY: ICD-10-CM

## 2019-05-10 DIAGNOSIS — Z34.03 ENCOUNTER FOR SUPERVISION OF NORMAL FIRST PREGNANCY IN THIRD TRIMESTER: Primary | ICD-10-CM

## 2019-05-10 LAB
ANTIBODY SCREEN, EXTERNAL: NEGATIVE
GTT, 1 HR, GLUCOLA, EXTERNAL: 79
HCT, EXTERNAL: 34.5
HGB EVAL, EXTERNAL: NORMAL
HGB, EXTERNAL: 11.7
PLATELET CNT,   EXTERNAL: 207
T. PALLIDUM, EXTERNAL: NEGATIVE
VARICELLA, EXTERNAL: NORMAL

## 2019-05-10 RX ORDER — SERTRALINE HYDROCHLORIDE 25 MG/1
25 TABLET, FILM COATED ORAL DAILY
Qty: 30 TAB | Refills: 1 | Status: SHIPPED | OUTPATIENT
Start: 2019-05-10 | End: 2019-06-03 | Stop reason: ALTCHOICE

## 2019-05-10 NOTE — PROGRESS NOTES
GDS next visit  Hgb Electrophoresis and VZV not done with IOB labs; will do today  Apos-- Rhogam not indicated  GFM

## 2019-05-10 NOTE — PATIENT INSTRUCTIONS

## 2019-05-13 LAB
BASOPHILS # BLD AUTO: 0 X10E3/UL (ref 0–0.2)
BASOPHILS NFR BLD AUTO: 0 %
BLD GP AB SCN SERPL QL: NEGATIVE
EOSINOPHIL # BLD AUTO: 0.1 X10E3/UL (ref 0–0.4)
EOSINOPHIL NFR BLD AUTO: 0 %
ERYTHROCYTE [DISTWIDTH] IN BLOOD BY AUTOMATED COUNT: 13.5 % (ref 12.3–15.4)
GLUCOSE 1H P 50 G GLC PO SERPL-MCNC: 79 MG/DL (ref 65–139)
HCT VFR BLD AUTO: 34.5 % (ref 34–46.6)
HGB A MFR BLD: 97.9 % (ref 96.4–98.8)
HGB A2 MFR BLD COLUMN CHROM: 2.1 % (ref 1.8–3.2)
HGB BLD-MCNC: 11.7 G/DL (ref 11.1–15.9)
HGB C MFR BLD: 0 %
HGB F MFR BLD: 0 % (ref 0–2)
HGB FRACT BLD-IMP: NORMAL
HGB OTHER MFR BLD HPLC: 0 %
HGB S BLD QL SOLY: NEGATIVE
HGB S MFR BLD: 0 %
IMM GRANULOCYTES # BLD AUTO: 0.1 X10E3/UL (ref 0–0.1)
IMM GRANULOCYTES NFR BLD AUTO: 1 %
LYMPHOCYTES # BLD AUTO: 1.9 X10E3/UL (ref 0.7–3.1)
LYMPHOCYTES NFR BLD AUTO: 16 %
MCH RBC QN AUTO: 31.5 PG (ref 26.6–33)
MCHC RBC AUTO-ENTMCNC: 33.9 G/DL (ref 31.5–35.7)
MCV RBC AUTO: 93 FL (ref 79–97)
MONOCYTES # BLD AUTO: 0.6 X10E3/UL (ref 0.1–0.9)
MONOCYTES NFR BLD AUTO: 5 %
NEUTROPHILS # BLD AUTO: 9.2 X10E3/UL (ref 1.4–7)
NEUTROPHILS NFR BLD AUTO: 78 %
PLATELET # BLD AUTO: 207 X10E3/UL (ref 150–379)
RBC # BLD AUTO: 3.71 X10E6/UL (ref 3.77–5.28)
T PALLIDUM AB SER QL IA: NEGATIVE
VZV IGG SER IA-ACNC: 1181 INDEX
WBC # BLD AUTO: 11.7 X10E3/UL (ref 3.4–10.8)

## 2019-05-22 ENCOUNTER — ROUTINE PRENATAL (OUTPATIENT)
Dept: OBGYN CLINIC | Age: 29
End: 2019-05-22

## 2019-05-22 ENCOUNTER — TELEPHONE (OUTPATIENT)
Dept: OBGYN CLINIC | Age: 29
End: 2019-05-22

## 2019-05-22 VITALS
WEIGHT: 207 LBS | BODY MASS INDEX: 34.49 KG/M2 | DIASTOLIC BLOOD PRESSURE: 80 MMHG | SYSTOLIC BLOOD PRESSURE: 130 MMHG | HEIGHT: 65 IN

## 2019-05-22 DIAGNOSIS — Z34.03 ENCOUNTER FOR SUPERVISION OF NORMAL FIRST PREGNANCY IN THIRD TRIMESTER: Primary | ICD-10-CM

## 2019-05-22 DIAGNOSIS — Z3A.24 24 WEEKS GESTATION OF PREGNANCY: ICD-10-CM

## 2019-05-22 NOTE — TELEPHONE ENCOUNTER
Regarding: Non-Urgent Medical Question  Contact: [phone]  ----- Message from [address] [place] [address] [address], Generic sent at 5/22/2019  9:27 AM EDT -----    Hi, I'm slightly upset right now. I had to cover for one of my colleagues in the [address] [place] [address] and had to  the room (in front of the glass) for about an hour. Patient had lots of fluoro for this procedure. I was wearing a vest the whole time but am still very anxious. Will someone please give me a call?

## 2019-05-22 NOTE — PATIENT INSTRUCTIONS

## 2019-05-22 NOTE — TELEPHONE ENCOUNTER
Spoke with patient. Offered reassurance-- sounds like she did everything correctly. H/o anxiety and depression. Reports sxs worsening. Started on Zoloft at last visit.   Appointment scheduled for patient to discuss 5/22 @ 2:20pm.

## 2019-05-22 NOTE — PROGRESS NOTES
Pt is reporting some anxiety related to work and concerns able exposure to radiation - pt followed appropriate safety precautions - discussed with pt to reassure and decrease anxity

## 2019-05-24 ENCOUNTER — ROUTINE PRENATAL (OUTPATIENT)
Dept: OBGYN CLINIC | Age: 29
End: 2019-05-24

## 2019-05-24 VITALS
HEIGHT: 65 IN | SYSTOLIC BLOOD PRESSURE: 108 MMHG | WEIGHT: 206.2 LBS | BODY MASS INDEX: 34.35 KG/M2 | DIASTOLIC BLOOD PRESSURE: 62 MMHG

## 2019-05-24 DIAGNOSIS — Z34.03 ENCOUNTER FOR SUPERVISION OF NORMAL FIRST PREGNANCY IN THIRD TRIMESTER: Primary | ICD-10-CM

## 2019-05-24 NOTE — PATIENT INSTRUCTIONS

## 2019-05-24 NOTE — PROGRESS NOTES
Pt reports no concerns   Dosage of Zoloft increased to 50 mg on Wed  GFM  Occ [facility]  Discussed normal discomforts of pregnancy

## 2019-06-01 ENCOUNTER — APPOINTMENT (OUTPATIENT)
Dept: VASCULAR SURGERY | Age: 29
End: 2019-06-01
Attending: PHYSICIAN ASSISTANT
Payer: COMMERCIAL

## 2019-06-01 ENCOUNTER — HOSPITAL ENCOUNTER (EMERGENCY)
Age: 29
Discharge: HOME | End: 2019-06-01
Attending: EMERGENCY MEDICINE
Payer: COMMERCIAL

## 2019-06-01 VITALS
OXYGEN SATURATION: 99 % | RESPIRATION RATE: 16 BRPM | HEIGHT: 65 IN | BODY MASS INDEX: 34.64 KG/M2 | DIASTOLIC BLOOD PRESSURE: 85 MMHG | SYSTOLIC BLOOD PRESSURE: 129 MMHG | TEMPERATURE: 98.5 F | WEIGHT: 207.89 LBS | HEART RATE: 83 BPM

## 2019-06-01 DIAGNOSIS — M79.604 RIGHT LEG PAIN: Primary | ICD-10-CM

## 2019-06-01 LAB
ALBUMIN SERPL-MCNC: 3 G/DL (ref 3.5–5)
ALBUMIN/GLOB SERPL: 0.7 {RATIO} (ref 1.1–2.2)
ALP SERPL-CCNC: 85 U/L (ref 45–117)
ALT SERPL-CCNC: 15 U/L (ref 12–78)
ANION GAP SERPL CALC-SCNC: 5 MMOL/L (ref 5–15)
APPEARANCE UR: CLEAR
AST SERPL-CCNC: 15 U/L (ref 15–37)
BACTERIA URNS QL MICRO: NEGATIVE /HPF
BASOPHILS # BLD: 0 K/UL (ref 0–0.1)
BASOPHILS NFR BLD: 0 % (ref 0–1)
BILIRUB SERPL-MCNC: 0.2 MG/DL (ref 0.2–1)
BILIRUB UR QL: NEGATIVE
BUN SERPL-MCNC: 7 MG/DL (ref 6–20)
BUN/CREAT SERPL: 10 (ref 12–20)
CALCIUM SERPL-MCNC: 9.2 MG/DL (ref 8.5–10.1)
CHLORIDE SERPL-SCNC: 106 MMOL/L (ref 97–108)
CO2 SERPL-SCNC: 25 MMOL/L (ref 21–32)
COLOR UR: NORMAL
COMMENT, HOLDF: NORMAL
CREAT SERPL-MCNC: 0.68 MG/DL (ref 0.55–1.02)
DIFFERENTIAL METHOD BLD: ABNORMAL
EOSINOPHIL # BLD: 0.1 K/UL (ref 0–0.4)
EOSINOPHIL NFR BLD: 1 % (ref 0–7)
EPITH CASTS URNS QL MICRO: NORMAL /LPF
ERYTHROCYTE [DISTWIDTH] IN BLOOD BY AUTOMATED COUNT: 12.5 % (ref 11.5–14.5)
GLOBULIN SER CALC-MCNC: 4.6 G/DL (ref 2–4)
GLUCOSE SERPL-MCNC: 93 MG/DL (ref 65–100)
GLUCOSE UR STRIP.AUTO-MCNC: NEGATIVE MG/DL
HCT VFR BLD AUTO: 37.2 % (ref 35–47)
HGB BLD-MCNC: 12.6 G/DL (ref 11.5–16)
HGB UR QL STRIP: NEGATIVE
HYALINE CASTS URNS QL MICRO: NORMAL /LPF (ref 0–5)
IMM GRANULOCYTES # BLD AUTO: 0.1 K/UL (ref 0–0.04)
IMM GRANULOCYTES NFR BLD AUTO: 1 % (ref 0–0.5)
KETONES UR QL STRIP.AUTO: NEGATIVE MG/DL
LEUKOCYTE ESTERASE UR QL STRIP.AUTO: NEGATIVE
LYMPHOCYTES # BLD: 1.9 K/UL (ref 0.8–3.5)
LYMPHOCYTES NFR BLD: 16 % (ref 12–49)
MCH RBC QN AUTO: 31.3 PG (ref 26–34)
MCHC RBC AUTO-ENTMCNC: 33.9 G/DL (ref 30–36.5)
MCV RBC AUTO: 92.3 FL (ref 80–99)
MONOCYTES # BLD: 0.9 K/UL (ref 0–1)
MONOCYTES NFR BLD: 7 % (ref 5–13)
NEUTS SEG # BLD: 9.2 K/UL (ref 1.8–8)
NEUTS SEG NFR BLD: 75 % (ref 32–75)
NITRITE UR QL STRIP.AUTO: NEGATIVE
NRBC # BLD: 0 K/UL (ref 0–0.01)
NRBC BLD-RTO: 0 PER 100 WBC
PH UR STRIP: 6 [PH] (ref 5–8)
PLATELET # BLD AUTO: 201 K/UL (ref 150–400)
PMV BLD AUTO: 9.5 FL (ref 8.9–12.9)
POTASSIUM SERPL-SCNC: 3.7 MMOL/L (ref 3.5–5.1)
PROT SERPL-MCNC: 7.6 G/DL (ref 6.4–8.2)
PROT UR STRIP-MCNC: NEGATIVE MG/DL
RBC # BLD AUTO: 4.03 M/UL (ref 3.8–5.2)
RBC #/AREA URNS HPF: NORMAL /HPF (ref 0–5)
SAMPLES BEING HELD,HOLD: NORMAL
SODIUM SERPL-SCNC: 136 MMOL/L (ref 136–145)
SP GR UR REFRACTOMETRY: 1.02 (ref 1–1.03)
UR CULT HOLD, URHOLD: NORMAL
UROBILINOGEN UR QL STRIP.AUTO: 0.2 EU/DL (ref 0.2–1)
WBC # BLD AUTO: 12.1 K/UL (ref 3.6–11)
WBC URNS QL MICRO: NORMAL /HPF (ref 0–4)

## 2019-06-01 PROCEDURE — 80053 COMPREHEN METABOLIC PANEL: CPT

## 2019-06-01 PROCEDURE — 74011250636 HC RX REV CODE- 250/636: Performed by: PHYSICIAN ASSISTANT

## 2019-06-01 PROCEDURE — 99284 EMERGENCY DEPT VISIT MOD MDM: CPT

## 2019-06-01 PROCEDURE — 93005 ELECTROCARDIOGRAM TRACING: CPT

## 2019-06-01 PROCEDURE — 36415 COLL VENOUS BLD VENIPUNCTURE: CPT

## 2019-06-01 PROCEDURE — 85025 COMPLETE CBC W/AUTO DIFF WBC: CPT

## 2019-06-01 PROCEDURE — 93971 EXTREMITY STUDY: CPT

## 2019-06-01 PROCEDURE — 96360 HYDRATION IV INFUSION INIT: CPT

## 2019-06-01 PROCEDURE — 81001 URINALYSIS AUTO W/SCOPE: CPT

## 2019-06-01 RX ADMIN — SODIUM CHLORIDE 1000 ML: 900 INJECTION, SOLUTION INTRAVENOUS at 13:35

## 2019-06-01 NOTE — ED PROVIDER NOTES
34 y.o. female with past medical history significant for A0 (32 weeks) presents with complaints right lower leg cramping which began yesterday. The pt states that nothing makes the pain better or worse. The pt rates the pain as a 6/10 in severity. The pt describes the pain as \"vasquez horse. \"  The pt denies taking anything at home for the discomfort. Pt denies pleuritic chest pain, hemoptysis, hx of blood clot. There are no other acute medical complaints at this time.     PCP: Vernell Rojas NP [place] , VIKY           Past Medical History:   Diagnosis Date    Headache     Herpes     Type 1--genital    Panic disorder     Psychiatric disorder     depression anxiety       Past Surgical History:   Procedure Laterality Date    HX ORTHOPAEDIC  2009    rt acl reconstruc    HX OTHER SURGICAL  2017    gallstones removal         Family History:   Problem Relation Age of Onset    Depression Mother     Depression Brother        Social History     Socioeconomic History    Marital status:      Spouse name: Not on file    Number of children: Not on file    Years of education: Not on file    Highest education level: Not on file   Occupational History    Not on file   Social Needs    Financial resource strain: Not on file    Food insecurity:     Worry: Not on file     Inability: Not on file    Transportation needs:     Medical: Not on file     Non-medical: Not on file   Tobacco Use    Smoking status: Former Smoker     Years: 5.00     Last attempt to quit: 2016     Years since quitting: 3.0    Smokeless tobacco: Never Used   Substance and Sexual Activity    Alcohol use: Not Currently    Drug use: Yes     Types: Marijuana     Comment: years ago    Sexual activity: Yes     Partners: Male     Birth control/protection: None   Lifestyle    Physical activity:     Days per week: Not on file     Minutes per session: Not on file    Stress: Not on file   Relationships    Social connections:     Talks on phone: Not on file     Gets together: Not on file     Attends Taoist service: Not on file     Active member of club or organization: Not on file     Attends meetings of clubs or organizations: Not on file     Relationship status: Not on file    Intimate partner violence:     Fear of current or ex partner: Not on file     Emotionally abused: Not on file     Physically abused: Not on file     Forced sexual activity: Not on file   Other Topics Concern    Not on file   Social History Narrative    Parents are . Found out late in life mother used donor sperm for pregnancy. ALLERGIES: Ciprofloxacin and Pcn [penicillins]    Review of Systems   Constitutional: Negative for chills, diaphoresis and fever. HENT: Negative for congestion, postnasal drip, rhinorrhea and sore throat. Eyes: Negative for photophobia, discharge, redness and visual disturbance. Respiratory: Negative for cough, chest tightness, shortness of breath and wheezing. Cardiovascular: Negative for chest pain, palpitations and leg swelling. Gastrointestinal: Negative for abdominal distention, abdominal pain, blood in stool, constipation, diarrhea, nausea and vomiting. Genitourinary: Negative for difficulty urinating, dysuria, frequency, hematuria and urgency. Musculoskeletal: Positive for myalgias. Negative for arthralgias, back pain and joint swelling. Skin: Negative for color change and rash. Neurological: Negative for dizziness, speech difficulty, weakness, light-headedness, numbness and headaches. Psychiatric/Behavioral: Negative for confusion. The patient is not nervous/anxious. All other systems reviewed and are negative.       Vitals:    06/01/19 1225 06/01/19 1241   BP:  138/85   Pulse:  (!) 125   Resp:  16   Temp:  98.5 °F (36.9 °C)   SpO2: 98% 96%   Weight:  94.3 kg (207 lb 14.3 oz)   Height:  5' 5\" (1.651 m)            Physical Exam   Constitutional: She is oriented to person, place, and time. She appears well-developed and well-nourished. No distress. HENT:   Head: Normocephalic and atraumatic. Head is without raccoon's eyes, without Blum's sign and without laceration. Right Ear: Hearing, tympanic membrane, external ear and ear canal normal. No foreign bodies. Tympanic membrane is not bulging. No hemotympanum. Left Ear: Hearing, tympanic membrane, external ear and ear canal normal. No foreign bodies. Tympanic membrane is not bulging. No hemotympanum. Nose: Nose normal. No mucosal edema or rhinorrhea. Right sinus exhibits no maxillary sinus tenderness and no frontal sinus tenderness. Left sinus exhibits no maxillary sinus tenderness and no frontal sinus tenderness. Mouth/Throat: Uvula is midline, oropharynx is clear and moist and mucous membranes are normal. No tonsillar abscesses. Eyes: Pupils are equal, round, and reactive to light. Conjunctivae and EOM are normal. Right eye exhibits no discharge. Left eye exhibits no discharge. Neck: Normal range of motion. Neck supple. Cardiovascular: Normal rate, regular rhythm and normal heart sounds. Exam reveals no gallop and no friction rub. No murmur heard. Regular rate and rhythm. No murmurs, gallops, rubs, or clicks. Pulmonary/Chest: Effort normal and breath sounds normal. No respiratory distress. She has no wheezes. She has no rales. No stridor or wheezes. No accessory muscle usage. No nasal flaring. Breath Sounds equal bilaterally. Abdominal: Soft. Bowel sounds are normal. She exhibits no distension. There is no tenderness. There is no rebound and no guarding. No abdominal Bruits. No pulsatile mass. No abdominal scars. Active bowel sounds. Genitourinary:   Genitourinary Comments: Gravid uterus      Musculoskeletal: Normal range of motion. She exhibits no edema, tenderness or deformity. Right calf NTTP     Neurological: She is alert and oriented to person, place, and time.    Skin: She is not diaphoretic. Nursing note and vitals reviewed. MDM  Number of Diagnoses or Management Options  Right leg pain:   Diagnosis management comments: Pt afebrile and nontoxic appearing. Vitals, labs, physical exam, and imaging studies all unremarkable. No signs of PE, PTX, ACS, esophageal rupture, dissection, pancreatitis, appendicitis, cholecystitis/cholagnitis, bowel obstruction/perforation, sepsis or any other acute life threatening condition. Will treat symptomatically and advise close follow up with family doctor.   Victor Manuel Gerard PA-C         Procedures

## 2019-06-01 NOTE — ED TRIAGE NOTES
Patient reports \"worst vasquez horse ever\" yesterday in right lower leg. Patient is 32 weeks pregnant. Patient with continued pain and lower leg swelling today, midwife suggested doppler today.

## 2019-06-01 NOTE — DISCHARGE INSTRUCTIONS
Patient Education        Leg Pain: Care Instructions  Your Care Instructions  Many things can cause leg pain. Too much exercise or overuse can cause a muscle cramp (or charley horse). You can get leg cramps from not eating a balanced diet that has enough potassium, calcium, and other minerals. If you do not drink enough fluids or are taking certain medicines, you may develop leg cramps. Other causes of leg pain include injuries, blood flow problems, nerve damage, and twisted and enlarged veins (varicose veins). You can usually ease pain with self-care. Your doctor may recommend that you rest your leg and keep it elevated. Follow-up care is a key part of your treatment and safety. Be sure to make and go to all appointments, and call your doctor if you are having problems. It's also a good idea to know your test results and keep a list of the medicines you take. How can you care for yourself at home? · Take pain medicines exactly as directed. ? If the doctor gave you a prescription medicine for pain, take it as prescribed. ? If you are not taking a prescription pain medicine, ask your doctor if you can take an over-the-counter medicine. · Take any other medicines exactly as prescribed. Call your doctor if you think you are having a problem with your medicine. · Rest your leg while you have pain, and avoid standing for long periods of time. · Prop up your leg at or above the level of your heart when possible. · Make sure you are eating a balanced diet that is rich in calcium, potassium, and magnesium, especially if you are pregnant. · If directed by your doctor, put ice or a cold pack on the area for 10 to 20 minutes at a time. Put a thin cloth between the ice and your skin. · Your leg may be in a splint, a brace, or an elastic bandage, and you may have crutches to help you walk. Follow your doctor's directions about how long to wear supports and how to use the crutches.   When should you call for help?  Call 911 anytime you think you may need emergency care. For example, call if:    · You have sudden chest pain and shortness of breath, or you cough up blood.     · Your leg is cool or pale or changes color.    Call your doctor now or seek immediate medical care if:    · You have increasing or severe pain.     · Your leg suddenly feels weak and you cannot move it.     · You have signs of a blood clot, such as:  ? Pain in your calf, back of the knee, thigh, or groin. ? Redness and swelling in your leg or groin.     · You have signs of infection, such as:  ? Increased pain, swelling, warmth, or redness. ? Red streaks leading from the sore area. ? Pus draining from a place on your leg. ? A fever.     · You cannot bear weight on your leg.    Watch closely for changes in your health, and be sure to contact your doctor if:    · You do not get better as expected. Where can you learn more? Go to http://haven-aliza.info/. Enter W711 in the search box to learn more about \"Leg Pain: Care Instructions. \"  Current as of: September 23, 2018  Content Version: 11.9  © 3099-7643 [employer]. Care instructions adapted under license by [employer] (which disclaims liability or warranty for this information). If you have questions about a medical condition or this instruction, always ask your healthcare professional. [place] [address] any warranty or liability for your use of this information. We hope that we have addressed all of your medical concerns. The examination and treatment you received in the Emergency Department were for an emergent problem and were not intended as complete care. It is important that you follow up with your healthcare provider(s) for ongoing care. If your symptoms worsen or do not improve as expected, and you are unable to reach your usual health care provider(s), you should return to the Emergency Department. Today's healthcare is undergoing tremendous change, and patient satisfaction surveys are one of the many tools to assess the quality of medical care. You may receive a survey from the [employer] regarding your experience in the Emergency Department. I hope that your experience has been completely positive, particularly the medical care that I provided. As such, please participate in the survey; anything less than excellent does not meet my expectations or intentions. [address] [place] [address] participate in nationally recognized quality of care measures. If your blood pressure is greater than 120/80, as reported below, we urge that you seek medical care to address the potential of high blood pressure, commonly known as hypertension. Hypertension can be hereditary or can be caused by certain medical conditions, pain, stress, or \"white coat syndrome. \"       Please make an appointment with your health care provider(s) for follow up of your Emergency Department visit. VITALS:   Patient Vitals for the past 8 hrs:   Temp Pulse Resp BP SpO2   06/01/19 1241 98.5 °F (36.9 °C) (!) 125 16 138/85 96 %   06/01/19 1225 -- -- -- -- 98 %          Thank you for allowing us to provide you with medical care today. We realize that you have many choices for your emergency care needs. Please choose us in the future for any continued health care needs. Arturo Escudero, [address] [place] [address].   Office: [phone]            Recent Results (from the past 24 hour(s))   CBC WITH AUTOMATED DIFF    Collection Time: 06/01/19  1:04 PM   Result Value Ref Range    WBC 12.1 (H) 3.6 - 11.0 K/uL    RBC 4.03 3.80 - 5.20 M/uL    HGB 12.6 11.5 - 16.0 g/dL    HCT 37.2 35.0 - 47.0 %    MCV 92.3 80.0 - 99.0 FL    MCH 31.3 26.0 - 34.0 PG    MCHC 33.9 30.0 - 36.5 g/dL    RDW 12.5 11.5 - 14.5 %    PLATELET 607 105 - 770 K/uL    MPV 9.5 8.9 - 12.9 FL    NRBC 0.0 0  WBC    ABSOLUTE NRBC 0.00 0.00 - 0.01 K/uL    NEUTROPHILS 75 32 - 75 %    LYMPHOCYTES 16 12 - 49 %    MONOCYTES 7 5 - 13 %    EOSINOPHILS 1 0 - 7 %    BASOPHILS 0 0 - 1 %    IMMATURE GRANULOCYTES 1 (H) 0.0 - 0.5 %    ABS. NEUTROPHILS 9.2 (H) 1.8 - 8.0 K/UL    ABS. LYMPHOCYTES 1.9 0.8 - 3.5 K/UL    ABS. MONOCYTES 0.9 0.0 - 1.0 K/UL    ABS. EOSINOPHILS 0.1 0.0 - 0.4 K/UL    ABS. BASOPHILS 0.0 0.0 - 0.1 K/UL    ABS. IMM. GRANS. 0.1 (H) 0.00 - 0.04 K/UL    DF AUTOMATED     METABOLIC PANEL, COMPREHENSIVE    Collection Time: 06/01/19  1:04 PM   Result Value Ref Range    Sodium 136 136 - 145 mmol/L    Potassium 3.7 3.5 - 5.1 mmol/L    Chloride 106 97 - 108 mmol/L    CO2 25 21 - 32 mmol/L    Anion gap 5 5 - 15 mmol/L    Glucose 93 65 - 100 mg/dL    BUN 7 6 - 20 MG/DL    Creatinine 0.68 0.55 - 1.02 MG/DL    BUN/Creatinine ratio 10 (L) 12 - 20      GFR est AA >60 >60 ml/min/1.73m2    GFR est non-AA >60 >60 ml/min/1.73m2    Calcium 9.2 8.5 - 10.1 MG/DL    Bilirubin, total 0.2 0.2 - 1.0 MG/DL    ALT (SGPT) 15 12 - 78 U/L    AST (SGOT) 15 15 - 37 U/L    Alk. phosphatase 85 45 - 117 U/L    Protein, total 7.6 6.4 - 8.2 g/dL    Albumin 3.0 (L) 3.5 - 5.0 g/dL    Globulin 4.6 (H) 2.0 - 4.0 g/dL    A-G Ratio 0.7 (L) 1.1 - 2.2     SAMPLES BEING HELD    Collection Time: 06/01/19  1:04 PM   Result Value Ref Range    SAMPLES BEING HELD 1BLU 1RED     COMMENT        Add-on orders for these samples will be processed based on acceptable specimen integrity and analyte stability, which may vary by analyte.    URINALYSIS W/MICROSCOPIC    Collection Time: 06/01/19  1:05 PM   Result Value Ref Range    Color YELLOW/STRAW      Appearance CLEAR CLEAR      Specific gravity 1.019 1.003 - 1.030      pH (UA) 6.0 5.0 - 8.0      Protein NEGATIVE  NEG mg/dL    Glucose NEGATIVE  NEG mg/dL    Ketone NEGATIVE  NEG mg/dL    Bilirubin NEGATIVE  NEG      Blood NEGATIVE  NEG      Urobilinogen 0.2 0.2 - 1.0 EU/dL    Nitrites NEGATIVE NEG      Leukocyte Esterase NEGATIVE  NEG      WBC 0-4 0 - 4 /hpf    RBC 0-5 0 - 5 /hpf    Epithelial cells FEW FEW /lpf    Bacteria NEGATIVE  NEG /hpf    Hyaline cast 0-2 0 - 5 /lpf   URINE CULTURE HOLD SAMPLE    Collection Time: 06/01/19  1:05 PM   Result Value Ref Range    Urine culture hold        URINE ON HOLD IN MICROBIOLOGY DEPT FOR 3 DAYS. IF UNPRESERVED URINE IS SUBMITTED, IT CANNOT BE USED FOR ADDITIONAL TESTING AFTER 24 HRS, RECOLLECTION WILL BE REQUIRED. EKG, 12 LEAD, INITIAL    Collection Time: 06/01/19  1:11 PM   Result Value Ref Range    Ventricular Rate 106 BPM    Atrial Rate 106 BPM    P-R Interval 140 ms    QRS Duration 84 ms    Q-T Interval 356 ms    QTC Calculation (Bezet) 472 ms    Calculated P Axis 52 degrees    Calculated R Axis -16 degrees    Calculated T Axis 20 degrees    Diagnosis       Sinus tachycardia with fusion complexes  When compared with ECG of 04-APR-2019 17:47,  fusion complexes are now present         No results found.

## 2019-06-02 LAB
ATRIAL RATE: 106 BPM
CALCULATED P AXIS, ECG09: 52 DEGREES
CALCULATED R AXIS, ECG10: -16 DEGREES
CALCULATED T AXIS, ECG11: 20 DEGREES
DIAGNOSIS, 93000: NORMAL
P-R INTERVAL, ECG05: 140 MS
Q-T INTERVAL, ECG07: 356 MS
QRS DURATION, ECG06: 84 MS
QTC CALCULATION (BEZET), ECG08: 472 MS
VENTRICULAR RATE, ECG03: 106 BPM

## 2019-06-03 RX ORDER — SERTRALINE HYDROCHLORIDE 50 MG/1
50 TABLET, FILM COATED ORAL DAILY
Qty: 30 TAB | Refills: 5 | Status: SHIPPED | OUTPATIENT
Start: 2019-06-03 | End: 2019-09-23 | Stop reason: SDUPTHER

## 2019-06-07 ENCOUNTER — ROUTINE PRENATAL (OUTPATIENT)
Dept: OBGYN CLINIC | Age: 29
End: 2019-06-07

## 2019-06-07 VITALS
BODY MASS INDEX: 35.06 KG/M2 | HEIGHT: 65 IN | WEIGHT: 210.4 LBS | DIASTOLIC BLOOD PRESSURE: 80 MMHG | SYSTOLIC BLOOD PRESSURE: 126 MMHG

## 2019-06-07 DIAGNOSIS — Z34.03 ENCOUNTER FOR SUPERVISION OF NORMAL FIRST PREGNANCY IN THIRD TRIMESTER: Primary | ICD-10-CM

## 2019-06-07 RX ORDER — SERTRALINE HYDROCHLORIDE 25 MG/1
TABLET, FILM COATED ORAL
Refills: 1 | COMMUNITY
Start: 2019-05-10 | End: 2019-07-25

## 2019-06-07 NOTE — PATIENT INSTRUCTIONS
Weeks 32 to 34 of Your Pregnancy: Care Instructions  Your Care Instructions    During the last few weeks of your pregnancy, you may have more aches and pains. It's important to rest when you can. Your growing baby is putting more pressure on your bladder. So you may need to urinate more often. Hemorrhoids are also common. These are painful, itchy veins in the rectal area. In the 36th week, most women have a test for group B streptococcus (GBS). GBS is a common bacteria that can live in the vagina and rectum. It can make your baby sick after birth. If you test positive, you will get antibiotics during labor. These will keep your baby from getting the bacteria. You may want to talk with your doctor about banking your baby's umbilical cord blood. This is the blood left in the cord after birth. If you want to save this blood, you must arrange it ahead of time. You can't decide at the last minute. If you haven't already had the Tdap shot during this pregnancy, talk to your doctor about getting it. It will help protect your  against pertussis infection. Follow-up care is a key part of your treatment and safety. Be sure to make and go to all appointments, and call your doctor if you are having problems. It's also a good idea to know your test results and keep a list of the medicines you take. How can you care for yourself at home? Ease hemorrhoids  · Get more liquids, fruits, vegetables, and fiber in your diet. This will help keep your stools soft. · Avoid sitting for too long. Lie on your left side several times a day. · Clean yourself with soft, moist toilet paper. Or you can use witch hazel pads or personal hygiene pads. · If you are uncomfortable, try ice packs. Or you can sit in a warm sitz bath. Do these for 20 minutes at a time, as needed. · Use hydrocortisone cream for pain and itching. Two examples are Anusol and Preparation H Hydrocortisone.   · Ask your doctor about taking an over-the-counter stool softener. Consider breastfeeding  · Experts recommend that women breastfeed for 1 year or longer. Breast milk is the perfect food for babies. · Breast milk is easier for babies to digest than formula. And it is always available, just the right temperature, and free. · Breast milk may help protect your child from some health problems.  babies are less likely than formula-fed babies to:  ? Get ear infections, colds, diarrhea, and pneumonia. ? Be obese or get diabetes later in life. · Women who breastfeed have less bleeding after the birth. Their uteruses also shrink back faster. · Some women who breastfeed lose weight faster. Making milk burns calories. · Breastfeeding can lower your risk of breast cancer, ovarian cancer, and osteoporosis. Decide about circumcision for boys  · As you make this decision, it may help to think about your personal, Church, and family traditions. You get to decide if you will keep your son's penis natural or if he will be circumcised. · If you decide that you would like to have your baby circumcised, talk with your doctor. You can share your concerns about pain. And you can discuss your preferences for anesthesia. Where can you learn more? Go to http://haven-aliza.info/. Enter B509 in the search box to learn more about \"Weeks 32 to 34 of Your Pregnancy: Care Instructions. \"  Current as of: September 5, 2018  Content Version: 11.9  © 5416-0583 [employer]. Care instructions adapted under license by [employer] (which disclaims liability or warranty for this information). If you have questions about a medical condition or this instruction, always ask your healthcare professional. [place] [address] any warranty or liability for your use of this information.          Weeks 32 to 34 of Your Pregnancy: Care Instructions  Your Care Instructions    During the last few weeks of your pregnancy, you may have more aches and pains. It's important to rest when you can. Your growing baby is putting more pressure on your bladder. So you may need to urinate more often. Hemorrhoids are also common. These are painful, itchy veins in the rectal area. In the 36th week, most women have a test for group B streptococcus (GBS). GBS is a common bacteria that can live in the vagina and rectum. It can make your baby sick after birth. If you test positive, you will get antibiotics during labor. These will keep your baby from getting the bacteria. You may want to talk with your doctor about banking your baby's umbilical cord blood. This is the blood left in the cord after birth. If you want to save this blood, you must arrange it ahead of time. You can't decide at the last minute. If you haven't already had the Tdap shot during this pregnancy, talk to your doctor about getting it. It will help protect your  against pertussis infection. Follow-up care is a key part of your treatment and safety. Be sure to make and go to all appointments, and call your doctor if you are having problems. It's also a good idea to know your test results and keep a list of the medicines you take. How can you care for yourself at home? Ease hemorrhoids  · Get more liquids, fruits, vegetables, and fiber in your diet. This will help keep your stools soft. · Avoid sitting for too long. Lie on your left side several times a day. · Clean yourself with soft, moist toilet paper. Or you can use witch hazel pads or personal hygiene pads. · If you are uncomfortable, try ice packs. Or you can sit in a warm sitz bath. Do these for 20 minutes at a time, as needed. · Use hydrocortisone cream for pain and itching. Two examples are Anusol and Preparation H Hydrocortisone. · Ask your doctor about taking an over-the-counter stool softener. Consider breastfeeding  · Experts recommend that women breastfeed for 1 year or longer.  Breast milk is the perfect food for babies. · Breast milk is easier for babies to digest than formula. And it is always available, just the right temperature, and free. · Breast milk may help protect your child from some health problems.  babies are less likely than formula-fed babies to:  ? Get ear infections, colds, diarrhea, and pneumonia. ? Be obese or get diabetes later in life. · Women who breastfeed have less bleeding after the birth. Their uteruses also shrink back faster. · Some women who breastfeed lose weight faster. Making milk burns calories. · Breastfeeding can lower your risk of breast cancer, ovarian cancer, and osteoporosis. Decide about circumcision for boys  · As you make this decision, it may help to think about your personal, Buddhist, and family traditions. You get to decide if you will keep your son's penis natural or if he will be circumcised. · If you decide that you would like to have your baby circumcised, talk with your doctor. You can share your concerns about pain. And you can discuss your preferences for anesthesia. Where can you learn more? Go to http://haven-aliza.info/. Enter S796 in the search box to learn more about \"Weeks 32 to 34 of Your Pregnancy: Care Instructions. \"  Current as of: September 5, 2018  Content Version: 11.9  © 6658-9596 [employer]. Care instructions adapted under license by [employer] (which disclaims liability or warranty for this information). If you have questions about a medical condition or this instruction, always ask your healthcare professional. [place] [address] any warranty or liability for your use of this information.

## 2019-06-07 NOTE — PROGRESS NOTES
Pt is concerned about her exposure to Thyroid hormones due to her handing some new medication that she gives to her cat.    Pt extremely anxious - not sleeping well, reviewed OTC   Reassured pt   TEDDY 2 weeks

## 2019-06-14 ENCOUNTER — ROUTINE PRENATAL (OUTPATIENT)
Dept: OBGYN CLINIC | Age: 29
End: 2019-06-14

## 2019-06-14 VITALS
SYSTOLIC BLOOD PRESSURE: 122 MMHG | WEIGHT: 212.2 LBS | BODY MASS INDEX: 35.35 KG/M2 | DIASTOLIC BLOOD PRESSURE: 74 MMHG | HEIGHT: 65 IN

## 2019-06-14 DIAGNOSIS — Z34.03 ENCOUNTER FOR SUPERVISION OF NORMAL FIRST PREGNANCY IN THIRD TRIMESTER: Primary | ICD-10-CM

## 2019-06-14 NOTE — PROGRESS NOTES
Presents to office without appointment d/t 'sharp pain in cervix'.   Happening more frequently, reports happened in first trimester then stopped, now has resumed and even happening without activity  Denies increase in BH contractions  Taking Benadryl 50 mg QHS for sleep  Strongly encouraged to not take nightly  PTL precautions rev  Offered Tdap today, declines, will do next visit  GBS next visit

## 2019-06-14 NOTE — PATIENT INSTRUCTIONS
Weeks 34 to 36 of Your Pregnancy: Care Instructions  Your Care Instructions    By now, your baby and your belly have grown quite large. It is almost time to give birth. A full-term pregnancy can deliver between 37 and 42 weeks. Your baby's lungs are almost ready to breathe air. The bones in your baby's head are now firm enough to protect it, but soft enough to move down through the birth canal.  You may feel excited, happy, anxious, or scared. You may wonder how you will know if you are in labor or what to expect during labor. Try to be flexible in your expectations of the birth. Because each birth is different, there is no way to know exactly what childbirth will be like for you. This care sheet will help you know what to expect and how to prepare. This may make your childbirth easier. If you haven't already had the Tdap shot during this pregnancy, talk to your doctor about getting it. It will help protect your  against pertussis infection. In the 36th week, most women have a test for group B streptococcus (GBS). GBS is a common bacteria that can live in the vagina and rectum. It can make your baby sick after birth. If you test positive, you will get antibiotics during labor. The medicine will keep your baby from getting the bacteria. Follow-up care is a key part of your treatment and safety. Be sure to make and go to all appointments, and call your doctor if you are having problems. It's also a good idea to know your test results and keep a list of the medicines you take. How can you care for yourself at home? Learn about pain relief choices  · Pain is different for every woman. Talk with your doctor about your feelings about pain. · You can choose from several types of pain relief. These include medicine or breathing techniques, as well as comfort measures. You can use more than one option. · If you choose to have pain medicine during labor, talk to your doctor about your options.  Some medicines lower anxiety and help with some of the pain. Others make your lower body numb so that you won't feel pain. · Be sure to tell your doctor about your pain medicine choice before you start labor or very early in your labor. You may be able to change your mind as labor progresses. · Rarely, a woman is put to sleep by medicine given through a mask or an IV. Labor and delivery  · The first stage of labor has three parts: early, active, and transition. ? Most women have early labor at home. You can stay busy or rest, eat light snacks, drink clear fluids, and start counting contractions. ? When talking during a contraction gets hard, you may be moving to active labor. During active labor, you should head for the hospital if you are not there already. ? You are in active labor when contractions come every 3 to 4 minutes and last about 60 seconds. Your cervix is opening more rapidly. ? If your water breaks, contractions will come faster and stronger. ? During transition, your cervix is stretching, and contractions are coming more rapidly. ? You may want to push, but your cervix might not be ready. Your doctor will tell you when to push. · The second stage starts when your cervix is completely opened and you are ready to push. ? Contractions are very strong to push the baby down the birth canal.  ? You will feel the urge to push. You may feel like you need to have a bowel movement. ? You may be coached to push with contractions. These contractions will be very strong, but you will not have them as often. You can get a little rest between contractions. ? You may be emotional and irritable. You may not be aware of what is going on around you.  ? One last push, and your baby is born. · The third stage is when a few more contractions push out the placenta. This may take 30 minutes or less. · The fourth stage is the welcome recovery. You may feel overwhelmed with emotions and exhausted but alert.  This is a good time to start breastfeeding. Where can you learn more? Go to http://haven-aliza.info/. Enter V231 in the search box to learn more about \"Weeks 34 to 36 of Your Pregnancy: Care Instructions. \"  Current as of: September 5, 2018  Content Version: 11.9  © 8321-5809 [employer]. Care instructions adapted under license by [employer] (which disclaims liability or warranty for this information). If you have questions about a medical condition or this instruction, always ask your healthcare professional. [address] any warranty or liability for your use of this information.

## 2019-06-21 ENCOUNTER — ROUTINE PRENATAL (OUTPATIENT)
Dept: OBGYN CLINIC | Age: 29
End: 2019-06-21

## 2019-06-21 ENCOUNTER — TELEPHONE (OUTPATIENT)
Dept: OBGYN CLINIC | Age: 29
End: 2019-06-21

## 2019-06-21 VITALS
BODY MASS INDEX: 35.42 KG/M2 | HEIGHT: 65 IN | WEIGHT: 212.6 LBS | DIASTOLIC BLOOD PRESSURE: 86 MMHG | SYSTOLIC BLOOD PRESSURE: 120 MMHG

## 2019-06-21 DIAGNOSIS — Z3A.24 24 WEEKS GESTATION OF PREGNANCY: ICD-10-CM

## 2019-06-21 DIAGNOSIS — O36.8130 DECREASED FETAL MOVEMENTS IN THIRD TRIMESTER, SINGLE OR UNSPECIFIED FETUS: Primary | ICD-10-CM

## 2019-06-21 DIAGNOSIS — Z23 ENCOUNTER FOR IMMUNIZATION: ICD-10-CM

## 2019-06-21 NOTE — LETTER
To Whom it may concern; 
 
     Tona Chand was seen in office today for a prenatal visit. Visit was longer than expected she will be returning to work by 11:30 am on 6/21/2019. Respectfully, Ugo Gray

## 2019-06-21 NOTE — PROGRESS NOTES
Here with concerns related to decreased fetal movement    Also to discuss the physical demands of work , fatigue and feelings of faintness if on her feet to long    Advised decreasing work hours

## 2019-06-21 NOTE — LETTER
To Whom it may concern; 
 
     Lefty Horner has been advised to reduce work hours to no more than 4 hours per day. May consider accomodations for tele-working for additional hours if needed. Respectfully, Jesenia Moy

## 2019-06-21 NOTE — TELEPHONE ENCOUNTER
Patient calling , 35w1d pregnant, C/O decreased fetal movement, nausea and abdominal pain/cramping. She reports she has felt her baby move here or there but nothing consistent. She denies bleeding/ROM. She does have increased vaginal discharge. The abdominal pain started about 5am this morning. Patient placed on schedule at 9:20, patient and nurse aware.

## 2019-06-21 NOTE — PROGRESS NOTES
NST procedure note    Jones Hidalgo is a ,  34 y.o. female [facility] whose Patient's last menstrual period was 2018 (exact date). was on  who presents for fetal non-stress test.    She is 35w1d and was monitored for 20 minutes and the FHR was reactive, with accelerations. NST Interpretation:    FHR baseline 155 bpm, variability moderate, accelerations present, decelerations Absent. Uterine contractions were present. Shruthi Draper was informed of the NST results and her questions were answered. Administered Tdap 0.5ml vaccine in right deltiod per CNM order. Patient tolerated the procedure without difficulty.     Lot#[id number]

## 2019-06-27 ENCOUNTER — ROUTINE PRENATAL (OUTPATIENT)
Dept: OBGYN CLINIC | Age: 29
End: 2019-06-27

## 2019-06-27 VITALS
SYSTOLIC BLOOD PRESSURE: 128 MMHG | DIASTOLIC BLOOD PRESSURE: 80 MMHG | BODY MASS INDEX: 35.82 KG/M2 | HEIGHT: 65 IN | WEIGHT: 215 LBS

## 2019-06-27 DIAGNOSIS — Z3A.36 36 WEEKS GESTATION OF PREGNANCY: Primary | ICD-10-CM

## 2019-06-27 LAB — GRBS, EXTERNAL: NEGATIVE

## 2019-06-27 NOTE — PATIENT INSTRUCTIONS
Weeks 34 to 36 of Your Pregnancy: Care Instructions  Your Care Instructions    By now, your baby and your belly have grown quite large. It is almost time to give birth. A full-term pregnancy can deliver between 37 and 42 weeks. Your baby's lungs are almost ready to breathe air. The bones in your baby's head are now firm enough to protect it, but soft enough to move down through the birth canal.  You may feel excited, happy, anxious, or scared. You may wonder how you will know if you are in labor or what to expect during labor. Try to be flexible in your expectations of the birth. Because each birth is different, there is no way to know exactly what childbirth will be like for you. This care sheet will help you know what to expect and how to prepare. This may make your childbirth easier. If you haven't already had the Tdap shot during this pregnancy, talk to your doctor about getting it. It will help protect your  against pertussis infection. In the 36th week, most women have a test for group B streptococcus (GBS). GBS is a common bacteria that can live in the vagina and rectum. It can make your baby sick after birth. If you test positive, you will get antibiotics during labor. The medicine will keep your baby from getting the bacteria. Follow-up care is a key part of your treatment and safety. Be sure to make and go to all appointments, and call your doctor if you are having problems. It's also a good idea to know your test results and keep a list of the medicines you take. How can you care for yourself at home? Learn about pain relief choices  · Pain is different for every woman. Talk with your doctor about your feelings about pain. · You can choose from several types of pain relief. These include medicine or breathing techniques, as well as comfort measures. You can use more than one option. · If you choose to have pain medicine during labor, talk to your doctor about your options.  Some medicines lower anxiety and help with some of the pain. Others make your lower body numb so that you won't feel pain. · Be sure to tell your doctor about your pain medicine choice before you start labor or very early in your labor. You may be able to change your mind as labor progresses. · Rarely, a woman is put to sleep by medicine given through a mask or an IV. Labor and delivery  · The first stage of labor has three parts: early, active, and transition. ? Most women have early labor at home. You can stay busy or rest, eat light snacks, drink clear fluids, and start counting contractions. ? When talking during a contraction gets hard, you may be moving to active labor. During active labor, you should head for the hospital if you are not there already. ? You are in active labor when contractions come every 3 to 4 minutes and last about 60 seconds. Your cervix is opening more rapidly. ? If your water breaks, contractions will come faster and stronger. ? During transition, your cervix is stretching, and contractions are coming more rapidly. ? You may want to push, but your cervix might not be ready. Your doctor will tell you when to push. · The second stage starts when your cervix is completely opened and you are ready to push. ? Contractions are very strong to push the baby down the birth canal.  ? You will feel the urge to push. You may feel like you need to have a bowel movement. ? You may be coached to push with contractions. These contractions will be very strong, but you will not have them as often. You can get a little rest between contractions. ? You may be emotional and irritable. You may not be aware of what is going on around you.  ? One last push, and your baby is born. · The third stage is when a few more contractions push out the placenta. This may take 30 minutes or less. · The fourth stage is the welcome recovery. You may feel overwhelmed with emotions and exhausted but alert.  This is a good time to start breastfeeding. Where can you learn more? Go to http://haven-aliza.info/. Enter D228 in the search box to learn more about \"Weeks 34 to 36 of Your Pregnancy: Care Instructions. \"  Current as of: September 5, 2018  Content Version: 11.9  © 3821-8997 [employer]. Care instructions adapted under license by [employer] (which disclaims liability or warranty for this information). If you have questions about a medical condition or this instruction, always ask your healthcare professional. [address] any warranty or liability for your use of this information.

## 2019-06-27 NOTE — PROGRESS NOTES
Pt voiced no concerns   Work needs additional info for limited work schedule, pt does not have FMLA and allowed 4-6 wks off for delivery.  If she takes off now, will impact time off after delivery  After long discussion, pt and partner opt to not reduce work hours at this time and take it on a day to day basis  GBS with sensitivities   Declines exam  Discussed Labor and Delivery, taking classes  Also has [employer]"

## 2019-06-28 RX ORDER — VALACYCLOVIR HYDROCHLORIDE 1 G/1
1000 TABLET, FILM COATED ORAL DAILY
Qty: 30 TAB | Refills: 1 | Status: SHIPPED | OUTPATIENT
Start: 2019-06-28 | End: 2019-08-27 | Stop reason: SDUPTHER

## 2019-07-02 LAB — B-HEM STREP SPEC QL CULT: NEGATIVE

## 2019-07-02 NOTE — PROGRESS NOTES
Great news, your Group B-Strep culture we obtained is Negative. This means you will not require antibiotics during your labor and delivery.  ~Angy

## 2019-07-12 ENCOUNTER — ROUTINE PRENATAL (OUTPATIENT)
Dept: OBGYN CLINIC | Age: 29
End: 2019-07-12

## 2019-07-12 VITALS — SYSTOLIC BLOOD PRESSURE: 118 MMHG | WEIGHT: 220.2 LBS | BODY MASS INDEX: 36.64 KG/M2 | DIASTOLIC BLOOD PRESSURE: 72 MMHG

## 2019-07-12 DIAGNOSIS — Z34.03 ENCOUNTER FOR SUPERVISION OF NORMAL FIRST PREGNANCY IN THIRD TRIMESTER: Primary | ICD-10-CM

## 2019-07-12 NOTE — PROGRESS NOTES
Reports [facility]'s  GFM  Mod RY, no overt s/sx Pre-E  Encouraged increased hydration  Discussed remainder of pregnancy, IOL if needed @ 41+ wks  Met with  last night  Precautions and SOL rev

## 2019-07-12 NOTE — PATIENT INSTRUCTIONS

## 2019-07-19 ENCOUNTER — ROUTINE PRENATAL (OUTPATIENT)
Dept: OBGYN CLINIC | Age: 29
End: 2019-07-19

## 2019-07-19 VITALS
WEIGHT: 221 LBS | DIASTOLIC BLOOD PRESSURE: 74 MMHG | SYSTOLIC BLOOD PRESSURE: 116 MMHG | HEIGHT: 65 IN | BODY MASS INDEX: 36.82 KG/M2

## 2019-07-19 DIAGNOSIS — Z3A.39 39 WEEKS GESTATION OF PREGNANCY: ICD-10-CM

## 2019-07-19 NOTE — PROGRESS NOTES
Desires cervical exam today but does not want to know a number  Reports underwear is wet upon waking up in am  Mod RY.   Denies HA/VC    Nitrazine neg  GFM  Denies SOL

## 2019-07-19 NOTE — PATIENT INSTRUCTIONS
Week 39 of Your Pregnancy: Care Instructions  Your Care Instructions    During these final weeks, you may feel anxious to see your new baby. [place] babies often look different from what you see in pictures or movies. Right after birth, their heads may have a strange shape. Their eyes may be puffy. And their genitals may be swollen. They may also have very dry skin, or red marks on the eyelids, nose, or neck. Still, most parents think their babies are beautiful. Follow-up care is a key part of your treatment and safety. Be sure to make and go to all appointments, and call your doctor if you are having problems. It's also a good idea to know your test results and keep a list of the medicines you take. How can you care for yourself at home? Prepare to breastfeed  · If you are breastfeeding, continue to eat healthy foods. · If your health care provider recommends it, keep taking your prenatal vitamins. · Talk to your doctor before you take any medicine or supplement. That's because some medicines can affect your breast milk. · You can help prevent sore nipples if you feed your baby in the correct position. Nurses will help you learn to do this. · Your  will need to be fed about every 1 to 3 hours. Choose the right birth control after your baby is born  · Women who are breastfeeding can still get pregnant. Use birth control if you don't want to get pregnant. · Intrauterine devices (IUDs) work for women who want to wait at least 2 years before getting pregnant again. They are safe to use while you are breastfeeding. · Depo-Provera can be used while you are breastfeeding. It is a shot you get every 3 months. · Birth control pills work well. But you need a different kind of pill while you are breastfeeding. And when you start taking these pills, you need to make sure to use another type of birth control until you start your second pack.   · Diaphragms, cervical caps, tubal implants, and condoms with spermicide work less well after birth. If you have a diaphragm or cervical cap, you will need to have it refitted. · Tubal ligation (tying your tubes) and vasectomy are both permanent. These are good options if you are sure you are done having children. Where can you learn more? Go to http://haven-aliza.info/. Enter T121 in the search box to learn more about \"Week 39 of Your Pregnancy: Care Instructions. \"  Current as of: September 5, 2018  Content Version: 11.9  © 8122-4842 Healthwise, Incorporated. Care instructions adapted under license by [employer] (which disclaims liability or warranty for this information). If you have questions about a medical condition or this instruction, always ask your healthcare professional. [address] any warranty or liability for your use of this information.

## 2019-07-22 ENCOUNTER — TELEPHONE (OUTPATIENT)
Dept: OBGYN CLINIC | Age: 29
End: 2019-07-22

## 2019-07-22 ENCOUNTER — HOSPITAL ENCOUNTER (EMERGENCY)
Age: 29
Discharge: HOME | End: 2019-07-22
Attending: OBSTETRICS & GYNECOLOGY | Admitting: OBSTETRICS & GYNECOLOGY
Payer: COMMERCIAL

## 2019-07-22 VITALS
HEART RATE: 72 BPM | WEIGHT: 221 LBS | SYSTOLIC BLOOD PRESSURE: 128 MMHG | HEIGHT: 65 IN | RESPIRATION RATE: 16 BRPM | TEMPERATURE: 98.5 F | BODY MASS INDEX: 36.82 KG/M2 | DIASTOLIC BLOOD PRESSURE: 73 MMHG

## 2019-07-22 LAB
A1 MICROGLOB PLACENTAL VAG QL: NEGATIVE
CONTROL LINE PRESENT?: NORMAL
EXPIRATION DATE: NORMAL
INTERNAL NEGATIVE CONTROL: NORMAL
KIT LOT NO.: NORMAL

## 2019-07-22 PROCEDURE — 84112 EVAL AMNIOTIC FLUID PROTEIN: CPT | Performed by: ADVANCED PRACTICE MIDWIFE

## 2019-07-22 PROCEDURE — 59025 FETAL NON-STRESS TEST: CPT

## 2019-07-22 PROCEDURE — 99285 EMERGENCY DEPT VISIT HI MDM: CPT

## 2019-07-22 NOTE — TELEPHONE ENCOUNTER
----- Message from Lucio Asif sent at 7/22/2019 12:19 PM EDT -----  Regarding: Visit Follow-Up Question  Contact: [phone]  My water broke 15 minutes ago- would like to head home for early labor.  Please call asap [phone]

## 2019-07-22 NOTE — TELEPHONE ENCOUNTER
Patient calling to state that her water broke as she had a huge gush that she showed to her  who stated that it more than likely was her water breaking. Patient does not want to immediately go to L&D and wants to go home and labor before going in. Patient wanted to speak with Juliocesar who was notified and states that she will call the patient back in  Just a few minutes. Patient notified.

## 2019-07-22 NOTE — H&P
History & Physical    Name: Torin Elias MRN: [id number]  SSN: [id number]    YOB: 1990  Age: 34 y.o. Sex: female        Subjective:     Estimated Date of Delivery: 19  OB History        1    Para        Term                AB        Living           SAB        TAB        Ectopic        Molar        Multiple        Live Births                    Ms. Jimmy Bruce presents with  pregnancy at 39w4d for presumptive ROM- . Prenatal course was normal. Please see prenatal records for details. Patient Active Problem List    Diagnosis    24 weeks gestation of pregnancy     Primary Provider: Maria Ines    EDC by  Pertinents: Transfer from [address] [facility] (Dr. Shari Calloway). Records scanned under media. Desires midwife delivery  H/o anxiety, depression and panic disorder- starting Zoloft 25 mg 5/10/19 per pt request  HSV type 1- prophylactic valtrex 36 weeks    IOB labs: wnl  Genetic Screening: Quad neg  Anatomy: wnl. Anterior Placenta, no previa. Gender Secret! GTT: 79  Flu:  TDAP: given  Rhogam: Apos  Third Tri Labs: wnl  GBS: NEG    Pain mgmt. 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 9)     No specialty comments available.   Past Medical History:   Diagnosis Date    Abnormal Papanicolaou smear of cervix     - normal follow up    Headache     Herpes     Type 1--genital    Panic disorder     Psychiatric disorder     depression anxiety     Past Surgical History:   Procedure Laterality Date    HX ORTHOPAEDIC  2009    rt acl reconstruc    HX OTHER SURGICAL  2017    gallstones removal     Social History     Occupational History    Not on file   Tobacco Use    Smoking status: Former Smoker     Years: 5.00     Last attempt to quit: 2016     Years since quitting: 3.1    Smokeless tobacco: Never Used Substance and Sexual Activity    Alcohol use: Not Currently    Drug use: Yes     Types: Marijuana     Comment: years ago    Sexual activity: Yes     Partners: Male     Birth control/protection: None     Family History   Problem Relation Age of Onset    Depression Mother     Depression Brother        Allergies   Allergen Reactions    Ciprofloxacin Rash    Pcn [Penicillins] Itching     Prior to Admission medications    Medication Sig Start Date End Date Taking? Authorizing Provider   valACYclovir (VALTREX) 1 gram tablet Take 1 Tab by mouth daily. 19  Yes Javon Barillas NP   sertraline (ZOLOFT) 50 mg tablet Take 1 Tab by mouth daily. 6/3/19  Yes Cara Cervantes CNM   prenatal vit calc,iron,folic (PRENATAL #2 PO) Take  by mouth. Yes Provider, Historical   sertraline (ZOLOFT) 25 mg tablet TAKE 1 TABLET BY MOUTH EVERY DAY 5/10/19   Provider, Historical        Review of Systems: A comprehensive review of systems was negative except for that written in the HPI. Objective:     Vitals:  Vitals:    19 1333 19 1348 19 1358 19 1407   BP: 137/77 138/83  128/73   Pulse: 74 92  72   Resp:  16     Temp:  98.5 °F (36.9 °C)     Weight:   221 lb (100.2 kg)    Height:   5' 5\" (1.651 m)         Physical Exam:  SVE2/80/-2 fetal head bollotable, BOW felt, no leaking with exam or coughing   Membranes:  Intact  Fetal Heart Rate: reactive    NST procedure note    Quan Blackburn is a ,  34 y.o. female [facility] whose LMP  was on  who presents for fetal non-stress test.    She is 39w4d and was monitored for 30 minutes and the FHR was reactive. NST Interpretation:    FHR baseline 135 bpm, variability moderate, accelerations present, decelerations Absent. Uterine contractions were present. Pt not feeling    [facility] was informed of the NST results and her questions were answered.     Disposition:   Home, RTC with next apt          Prenatal Labs:   Lab Results   Component Value Date/Time    Rubella, External Immune 12/17/2018    GrBStrep, External Negative 06/27/2019    HBsAg, External negative 12/17/2018    HIV, External non-reative  12/17/2018    Gonorrhea, External negative 12/17/2018    Chlamydia, External negative 12/17/2018        Assessment/Plan:     Plan: Admit for Reassuring fetal status. Group B Strep was negative.     Signed By:  Carolyn Jiang CNM     July 22, 2019

## 2019-07-22 NOTE — DISCHARGE INSTRUCTIONS
Patient Education        Week 39 of Your Pregnancy: Care Instructions  Your Care Instructions    During these final weeks, you may feel anxious to see your new baby. [place] babies often look different from what you see in pictures or movies. Right after birth, their heads may have a strange shape. Their eyes may be puffy. And their genitals may be swollen. They may also have very dry skin, or red marks on the eyelids, nose, or neck. Still, most parents think their babies are beautiful. Follow-up care is a key part of your treatment and safety. Be sure to make and go to all appointments, and call your doctor if you are having problems. It's also a good idea to know your test results and keep a list of the medicines you take. How can you care for yourself at home? Prepare to breastfeed  · If you are breastfeeding, continue to eat healthy foods. · If your health care provider recommends it, keep taking your prenatal vitamins. · Talk to your doctor before you take any medicine or supplement. That's because some medicines can affect your breast milk. · You can help prevent sore nipples if you feed your baby in the correct position. Nurses will help you learn to do this. · Your  will need to be fed about every 1 to 3 hours. Choose the right birth control after your baby is born  · Women who are breastfeeding can still get pregnant. Use birth control if you don't want to get pregnant. · Intrauterine devices (IUDs) are very effective at preventing pregnancy and can provide birth control for 3 to 10 years, depending on the type. If you talk with your doctor before having your baby, the IUD can be placed right after you give birth. If you decide you want to get pregnant later, you can have it removed. They are safe to use while you are breastfeeding. · A hormonal implant is also very effective at preventing pregnancy. It is placed under the skin of the arm. This can be done right after you give birth.  It releases the hormone progestin and prevents pregnancy for about 3 years. This can also be removed by a doctor at any time. It is safe to use while you are breastfeeding. · Depo-Provera can be used while you are breastfeeding. It is a shot you get every 3 months. · Birth control pills work well. But you need a different kind of pill for the first few weeks after giving birth. And when you start taking these pills, you need to make sure to use another type of birth control for 7 days after you start your pack. · Diaphragms, cervical caps, and condoms with spermicide work less well after birth. If you have a diaphragm or cervical cap, talk to your doctor to see if you need a different size. · Tubal ligation (tying your tubes) and vasectomy are both permanent. These are good options if you are sure you are done having children. Where can you learn more? Go to http://haven-aliza.info/. Enter J617 in the search box to learn more about \"Week 39 of Your Pregnancy: Care Instructions. \"  Current as of: September 5, 2018  Content Version: 12.1  © 2092-5788 [employer]. Care instructions adapted under license by [employer] (which disclaims liability or warranty for this information). If you have questions about a medical condition or this instruction, always ask your healthcare professional. [place] [address] any warranty or liability for your use of this information. Patient Education        Counting Your Baby's Kicks: Care Instructions  Your Care Instructions    Counting your baby's kicks is one way your doctor can tell that your baby is healthy. Most women--especially in a first pregnancy--feel their baby move for the first time between 16 and 22 weeks. The movement may feel like flutters rather than kicks. Your baby may move more at certain times of the day. When you are active, you may notice less kicking than when you are resting.  At your prenatal visits, your doctor will ask whether the baby is active. In your last trimester, your doctor may ask you to count the number of times you feel your baby move. Follow-up care is a key part of your treatment and safety. Be sure to make and go to all appointments, and call your doctor if you are having problems. It's also a good idea to know your test results and keep a list of the medicines you take. How do you count fetal kicks? · A common method of checking your baby's movement is to count the number of kicks or moves you feel in 1 hour. Ten movements (such as kicks, flutters, or rolls) in 1 hour are normal. Some doctors suggest that you count in the morning until you get to 10 movements. Then you can quit for that day and start again the next day. · Pick your baby's most active time of day to count. This may be any time from morning to evening. · If you do not feel 10 movements in an hour, your baby may be sleeping. Wait for the next hour and count again. When should you call for help? Call your doctor now or seek immediate medical care if:    · You noticed that your baby has stopped moving or is moving much less than normal.    Watch closely for changes in your health, and be sure to contact your doctor if you have any problems. Where can you learn more? Go to http://haven-aliza.info/. Enter A349 in the search box to learn more about \"Counting Your Baby's Kicks: Care Instructions. \"  Current as of: September 5, 2018  Content Version: 12.1  © 1475-4136 Healthwise, Incorporated. Care instructions adapted under license by [employer] (which disclaims liability or warranty for this information). If you have questions about a medical condition or this instruction, always ask your healthcare professional. [place] [address] any warranty or liability for your use of this information.        Patient Education        Week 40 of Your Pregnancy: Care Instructions  Your Care Instructions    By week 40, you have reached your due date. Your baby could be coming any day. But it's a good idea to think ahead to the next few weeks and what might happen. If this is your first time having a baby, try not to worry. If you don't start labor on your own by 41 or 42 weeks, your doctor may recommend giving you medicines to start labor. This care sheet gives you information about how labor can be started. It also gives you some ideas about breathing exercises you can do if you start to feel anxious or if you are trying to relax. Follow-up care is a key part of your treatment and safety. Be sure to make and go to all appointments, and call your doctor if you are having problems. It's also a good idea to know your test results and keep a list of the medicines you take. How can you care for yourself at home? Learn how labor can be started  · If you and your baby are both healthy and ready, and if your cervix has started to open, your doctor may \"break your water\" (rupture the amniotic sac). This often starts labor. · If your cervix is not quite ready, you may get a medicine called Pitocin through an IV to start contractions. · If your cervix is still very firm, you may have prostaglandin tablets (misoprostol) placed in your vagina to soften the cervix. Try guided imagery to help you relax  · Find a comfortable place to sit or lie down. Close your eyes. · Start by just taking a few deep breaths to help you relax. · Picture a setting that is calm and peaceful. This could be a beach, a mountain setting, a meadow, or a scene that you choose. · Imagine your scene, and try to add some detail. For example, is there a breeze? What does the p[place] look like? Is it clear, or are there clouds? · It often helps to add a path to your scene. For example, as you enter the meadow, imagine a path leading you through the meadow to the trees on the other side.  As you follow the path farther into the [facility] you feel more and more relaxed. · When you are deep into your scene and are feeling relaxed, take a few minutes to breathe slowly and feel the calm. · When you are ready, slowly take yourself out of the scene back to the present. Tell yourself that you will feel relaxed and refreshed and will bring that sense of calm with you. · Count to 3, and open your eyes. Where can you learn more? Go to http://haven-aliza.info/. Enter G648 in the search box to learn more about \"Week 40 of Your Pregnancy: Care Instructions. \"  Current as of: September 5, 2018  Content Version: 12.1  © 0578-3507 Healthwise, [employer]. Care instructions adapted under license by [employer] (which disclaims liability or warranty for this information). If you have questions about a medical condition or this instruction, always ask your healthcare professional. [address] any warranty or liability for your use of this information.

## 2019-07-22 NOTE — TELEPHONE ENCOUNTER
Patient calling , 39w4d pregnant, C/O feeling \"off\". She states that she is at work and started to get blurry vision and lightheadedness and could not see the computer screen at work. She states she sat down and one of her co-workers took her BP which resulted in 134/90. She reports good fetal movement, denies bleeding/Contractions/ROM. She would like to be seen because she believes something just doesn't feel right. Patient placed on schedule for 1pm and advised that I would call her back with any additional recommendations if needed.

## 2019-07-22 NOTE — TELEPHONE ENCOUNTER
----- Message from Mak Barragan sent at 7/22/2019 12:19 PM EDT -----  Regarding: Visit Follow-Up Question  Contact: [phone]  My water broke 15 minutes ago- would like to head home for early labor.  Please call asap [phone]

## 2019-07-22 NOTE — PROGRESS NOTES
1500- kari palmer at bedside SVE 2cm, no leaking fluid noted on exam, nitrazine negative, amnisure negative    1505- POC to discharge patient home    1520- patient discharged home.

## 2019-07-23 ENCOUNTER — TELEPHONE (OUTPATIENT)
Dept: OBGYN CLINIC | Age: 29
End: 2019-07-23

## 2019-07-23 ENCOUNTER — HOSPITAL ENCOUNTER (INPATIENT)
Age: 29
LOS: 2 days | Discharge: HOME | End: 2019-07-25
Attending: OBSTETRICS & GYNECOLOGY | Admitting: OBSTETRICS & GYNECOLOGY
Payer: COMMERCIAL

## 2019-07-23 PROBLEM — Z34.90 PREGNANCY: Status: ACTIVE | Noted: 2019-07-23

## 2019-07-23 LAB
ERYTHROCYTE [DISTWIDTH] IN BLOOD BY AUTOMATED COUNT: 14 % (ref 11.5–14.5)
HCT VFR BLD AUTO: 37.7 % (ref 35–47)
HGB BLD-MCNC: 12.7 G/DL (ref 11.5–16)
MCH RBC QN AUTO: 31.1 PG (ref 26–34)
MCHC RBC AUTO-ENTMCNC: 33.7 G/DL (ref 30–36.5)
MCV RBC AUTO: 92.4 FL (ref 80–99)
NRBC # BLD: 0 K/UL (ref 0–0.01)
NRBC BLD-RTO: 0 PER 100 WBC
PLATELET # BLD AUTO: 160 K/UL (ref 150–400)
PMV BLD AUTO: 10.9 FL (ref 8.9–12.9)
RBC # BLD AUTO: 4.08 M/UL (ref 3.8–5.2)
WBC # BLD AUTO: 12.5 K/UL (ref 3.6–11)

## 2019-07-23 PROCEDURE — 74011250637 HC RX REV CODE- 250/637: Performed by: ADVANCED PRACTICE MIDWIFE

## 2019-07-23 PROCEDURE — 77030005537 HC CATH URETH BARD -A

## 2019-07-23 PROCEDURE — 85027 COMPLETE CBC AUTOMATED: CPT

## 2019-07-23 PROCEDURE — 75410000000 HC DELIVERY VAGINAL/SINGLE

## 2019-07-23 PROCEDURE — 75410000003 HC RECOV DEL/VAG/CSECN EA 0.5 HR

## 2019-07-23 PROCEDURE — 36415 COLL VENOUS BLD VENIPUNCTURE: CPT

## 2019-07-23 PROCEDURE — 65410000002 HC RM PRIVATE OB

## 2019-07-23 PROCEDURE — 4A0HXCZ MEASUREMENT OF PRODUCTS OF CONCEPTION, CARDIAC RATE, EXTERNAL APPROACH: ICD-10-PCS | Performed by: OBSTETRICS & GYNECOLOGY

## 2019-07-23 PROCEDURE — 0KQM0ZZ REPAIR PERINEUM MUSCLE, OPEN APPROACH: ICD-10-PCS | Performed by: OBSTETRICS & GYNECOLOGY

## 2019-07-23 PROCEDURE — 77030031139 HC SUT VCRL2 J&J -A

## 2019-07-23 PROCEDURE — 87205 SMEAR GRAM STAIN: CPT

## 2019-07-23 PROCEDURE — 74011250636 HC RX REV CODE- 250/636: Performed by: ADVANCED PRACTICE MIDWIFE

## 2019-07-23 PROCEDURE — 75410000002 HC LABOR FEE PER 1 HR

## 2019-07-23 RX ORDER — SODIUM CHLORIDE 0.9 % (FLUSH) 0.9 %
5-40 SYRINGE (ML) INJECTION EVERY 8 HOURS
Status: DISCONTINUED | OUTPATIENT
Start: 2019-07-23 | End: 2019-07-25 | Stop reason: HOSPADM

## 2019-07-23 RX ORDER — ACETAMINOPHEN 10 MG/ML
1000 INJECTION, SOLUTION INTRAVENOUS ONCE
Status: DISCONTINUED | OUTPATIENT
Start: 2019-07-23 | End: 2019-07-23

## 2019-07-23 RX ORDER — OXYTOCIN/0.9 % SODIUM CHLORIDE 30/500 ML
PLASTIC BAG, INJECTION (ML) INTRAVENOUS
Status: DISCONTINUED
Start: 2019-07-23 | End: 2019-07-23

## 2019-07-23 RX ORDER — AMMONIA 15 % (W/V)
1 AMPUL (EA) INHALATION AS NEEDED
Status: DISCONTINUED | OUTPATIENT
Start: 2019-07-23 | End: 2019-07-25 | Stop reason: HOSPADM

## 2019-07-23 RX ORDER — ZOLPIDEM TARTRATE 5 MG/1
5 TABLET ORAL
Status: DISCONTINUED | OUTPATIENT
Start: 2019-07-23 | End: 2019-07-25 | Stop reason: HOSPADM

## 2019-07-23 RX ORDER — SERTRALINE HYDROCHLORIDE 50 MG/1
50 TABLET, FILM COATED ORAL
Status: DISCONTINUED | OUTPATIENT
Start: 2019-07-23 | End: 2019-07-25 | Stop reason: HOSPADM

## 2019-07-23 RX ORDER — SODIUM CHLORIDE 0.9 % (FLUSH) 0.9 %
5-40 SYRINGE (ML) INJECTION AS NEEDED
Status: DISCONTINUED | OUTPATIENT
Start: 2019-07-23 | End: 2019-07-25 | Stop reason: HOSPADM

## 2019-07-23 RX ORDER — HYDROCODONE BITARTRATE AND ACETAMINOPHEN 5; 325 MG/1; MG/1
1 TABLET ORAL
Status: DISCONTINUED | OUTPATIENT
Start: 2019-07-23 | End: 2019-07-25 | Stop reason: HOSPADM

## 2019-07-23 RX ORDER — SODIUM CHLORIDE 0.9 % (FLUSH) 0.9 %
SYRINGE (ML) INJECTION
Status: COMPLETED
Start: 2019-07-23 | End: 2019-07-23

## 2019-07-23 RX ORDER — IBUPROFEN 400 MG/1
800 TABLET ORAL EVERY 8 HOURS
Status: DISCONTINUED | OUTPATIENT
Start: 2019-07-23 | End: 2019-07-25 | Stop reason: HOSPADM

## 2019-07-23 RX ORDER — HYDROCORTISONE 1 %
CREAM (GRAM) TOPICAL AS NEEDED
Status: DISCONTINUED | OUTPATIENT
Start: 2019-07-23 | End: 2019-07-25 | Stop reason: HOSPADM

## 2019-07-23 RX ORDER — ACETAMINOPHEN 500 MG
500 TABLET ORAL
COMMUNITY
End: 2019-07-25

## 2019-07-23 RX ORDER — SODIUM CHLORIDE, SODIUM LACTATE, POTASSIUM CHLORIDE, CALCIUM CHLORIDE 600; 310; 30; 20 MG/100ML; MG/100ML; MG/100ML; MG/100ML
125 INJECTION, SOLUTION INTRAVENOUS CONTINUOUS
Status: DISCONTINUED | OUTPATIENT
Start: 2019-07-23 | End: 2019-07-23

## 2019-07-23 RX ORDER — OXYTOCIN/RINGER'S LACTATE 20/1000 ML
999 PLASTIC BAG, INJECTION (ML) INTRAVENOUS AS NEEDED
Status: DISCONTINUED | OUTPATIENT
Start: 2019-07-23 | End: 2019-07-25 | Stop reason: HOSPADM

## 2019-07-23 RX ORDER — HYDROCORTISONE ACETATE PRAMOXINE HCL 2.5; 1 G/100G; G/100G
CREAM TOPICAL AS NEEDED
Status: DISCONTINUED | OUTPATIENT
Start: 2019-07-23 | End: 2019-07-25 | Stop reason: HOSPADM

## 2019-07-23 RX ORDER — DOCUSATE SODIUM 100 MG/1
100 CAPSULE, LIQUID FILLED ORAL
Status: DISCONTINUED | OUTPATIENT
Start: 2019-07-23 | End: 2019-07-25 | Stop reason: HOSPADM

## 2019-07-23 RX ORDER — ACETAMINOPHEN 325 MG/1
650 TABLET ORAL
Status: DISCONTINUED | OUTPATIENT
Start: 2019-07-23 | End: 2019-07-25 | Stop reason: HOSPADM

## 2019-07-23 RX ORDER — SIMETHICONE 80 MG
80 TABLET,CHEWABLE ORAL
Status: DISCONTINUED | OUTPATIENT
Start: 2019-07-23 | End: 2019-07-25 | Stop reason: HOSPADM

## 2019-07-23 RX ORDER — LIDOCAINE HYDROCHLORIDE 10 MG/ML
INJECTION INFILTRATION; PERINEURAL
Status: DISCONTINUED
Start: 2019-07-23 | End: 2019-07-23

## 2019-07-23 RX ADMIN — IBUPROFEN 800 MG: 400 TABLET ORAL at 20:45

## 2019-07-23 RX ADMIN — Medication 10 ML: at 12:20

## 2019-07-23 RX ADMIN — SERTRALINE HYDROCHLORIDE 50 MG: 50 TABLET ORAL at 23:09

## 2019-07-23 RX ADMIN — HYDROCODONE BITARTRATE AND ACETAMINOPHEN 1 TABLET: 5; 325 TABLET ORAL at 20:44

## 2019-07-23 RX ADMIN — SODIUM CHLORIDE, SODIUM LACTATE, POTASSIUM CHLORIDE, AND CALCIUM CHLORIDE 999 ML/HR: 600; 310; 30; 20 INJECTION, SOLUTION INTRAVENOUS at 15:20

## 2019-07-23 NOTE — TELEPHONE ENCOUNTER
Call received 9:20am      34year old  39w5d pregnant patient last seen in the office on 19. HIPPA verified to speak to  on his wife's behalf.  reports she has been having contractions for the past hour coming every 5 minutes. Patient denies vaginal bleeding,ROM and reports positive fetal movement. This nurse advised patient to come to the office now to be seen per nurse sharmila Rothman.  verbalized understanding.

## 2019-07-23 NOTE — TELEPHONE ENCOUNTER
9:40 am- patient's  called and reported that his wife is in labor and the contractions are typically 3-4 minutes apart, sometimes 5 minutes. They are calling to report that she is not able to make it to the office. Drea Spain spoke to Go at [facility] to report to practioner and I spoke to Adrianne Echevarria at [facility] L&D to report that patient is 10 minutes out. GBS Negative.

## 2019-07-23 NOTE — PROGRESS NOTES
1145-Bedside and Verbal shift change report given to CARLOZ Gentile RN (oncoming nurse) by John Burrows. Kael Maloney RN (offgoing nurse). Report included the following information SBAR. Asif Hunter CNM at bedside. SVE done. Will admit to inpatient. 1215-Pt to bed for monitoring and IV start. Clear fluid noted on floor, pt states she has been leaking. Will continue to monitor.  and  at bedside. 1350-DENICE Rios CNM updated. Pt requesting to not be checked at this moment. 1405-DENICE SILVERM at bedside. 1412-SVE by DENICE Jameson. 7 cms. Suggested patient use the tub again. [id number] to trace FHR in knee chest position, pt turned to left side. 1510-Pt feeling more pressure, pt requested DENICE SILVERM at bedside. 1518-SVE done. 8 cms, edematous. Discussing POC and options with patient. Pt requesting IV hydration and nitrous oxide. -1723-Brief report given to KATARINA Castaneda RN for lunch relief. 172-Pt now back in bed on knee chest.  FHR montioring being done between [facility]. Asif Hunter CNM remains at bedside. -Pt continues pushing well, Asif Hunter CNM remains at bedside. Pt intermittently using nitrous oxide. -, see delivery summary. -DENICE Rios CNM aware of post delivery temp of 100.8. IV tylenol ordered. -DENICE Jameson aware of most recent temp of 97.8. Ordered to hold IV tylenol for now. -placenta culture done by DENICE Jameson and sent. Bedside shift change report given to BRYANT Thomas RN (oncoming nurse) by John Burrows. Jonathon Gentile RN (offgoing nurse). Report included the following information SBAR.

## 2019-07-23 NOTE — ROUTINE PROCESS
Bedside and Verbal shift change report given to Janel Borrego RN (oncoming nurse) by Rell Holden RN (offgoing nurse). Report included the following information SBAR, Kardex, Intake/Output, MAR and Recent Results. 1955:  Hold IV tylenol unless pt becomes febrile per BEULAH Rios CNM. 2130:  Pt assisted to bedside commode. Unable to void. Educated on [employer]. 2200:  TRANSFER - OUT REPORT:    Verbal report given to SMITH Thompson RN(name) on [employer]  being transferred to MIU(unit) for routine progression of care       Report consisted of patients Situation, Background, Assessment and   Recommendations(SBAR). Information from the following report(s) SBAR, Intake/Output, MAR and Recent Results was reviewed with the receiving nurse. Lines:   Peripheral IV 07/23/19 Left Hand (Active)   Site Assessment Clean, dry, & intact 7/23/2019 12:25 PM   Phlebitis Assessment 0 7/23/2019 12:25 PM   Infiltration Assessment 0 7/23/2019 12:25 PM   Dressing Status Clean, dry, & intact 7/23/2019 12:25 PM   Dressing Type Topical skin adhesive; Tape 7/23/2019 12:25 PM        Opportunity for questions and clarification was provided. Patient transported with:   Registered Nurse     2528:  Fundal check done during transfer report. Pt fundus +1 with light trickling. Straight cath performed for 450mL. Fundus -1 with scant bleeding after bladder was emptied.

## 2019-07-23 NOTE — PROGRESS NOTES
Pt arrived to L&D #10, pt reports contractions since 1330, pt denies any complications with pregnancy, pt wishes for low intervention through labor, to br to gown;    @1002 pt placed on monitor;  @1003 SVE 2/70/-2 by K. Everrett Kehr  @discussed low intervention plan with pat/family  @1020 pt off monitor, up to ambulate in room  @1045  arrived in room  @1100 pt up to shower  @1125 L. Rebecca Ao in, SVE 3-4/70/+1, orders recd from [facility];  @9625 Bedside, Verbal and Written shift change report given to CARLOZ Quinn RN (oncoming nurse) by Juaquin Krabbe. Jacob Titus RN (offgoing nurse). Report included the following information SBAR, Kardex, Intake/Output, MAR and Recent Results.

## 2019-07-24 PROCEDURE — 74011250637 HC RX REV CODE- 250/637: Performed by: ADVANCED PRACTICE MIDWIFE

## 2019-07-24 PROCEDURE — 65410000002 HC RM PRIVATE OB

## 2019-07-24 RX ORDER — VALACYCLOVIR HYDROCHLORIDE 500 MG/1
1000 TABLET, FILM COATED ORAL DAILY
Status: DISCONTINUED | OUTPATIENT
Start: 2019-07-25 | End: 2019-07-25 | Stop reason: HOSPADM

## 2019-07-24 RX ADMIN — DOCUSATE SODIUM 100 MG: 100 CAPSULE, LIQUID FILLED ORAL at 12:26

## 2019-07-24 RX ADMIN — MUPIROCIN: 20 OINTMENT TOPICAL at 11:22

## 2019-07-24 RX ADMIN — IBUPROFEN 800 MG: 400 TABLET ORAL at 13:38

## 2019-07-24 RX ADMIN — IBUPROFEN 800 MG: 400 TABLET ORAL at 05:17

## 2019-07-24 RX ADMIN — SERTRALINE HYDROCHLORIDE 50 MG: 50 TABLET ORAL at 21:35

## 2019-07-24 RX ADMIN — HYDROCODONE BITARTRATE AND ACETAMINOPHEN 1 TABLET: 5; 325 TABLET ORAL at 01:09

## 2019-07-24 RX ADMIN — HYDROCODONE BITARTRATE AND ACETAMINOPHEN 1 TABLET: 5; 325 TABLET ORAL at 12:26

## 2019-07-24 RX ADMIN — IBUPROFEN 800 MG: 400 TABLET ORAL at 21:31

## 2019-07-24 NOTE — ROUTINE PROCESS
Bedside and Verbal shift change report given to YESICA Jones (oncoming nurse) by Nico Dobbs RN (offgoing nurse). Report included the following information SBAR.

## 2019-07-24 NOTE — L&D DELIVERY NOTE
Delivery Note    provider:  Garry Walker CNM    Assistant: none    Pre-Delivery Diagnosis: Term pregnancy    Post-Delivery Diagnosis: Living  infant(s) and Female    Intrapartum Event: None    Procedure: Spontaneous vaginal delivery    Epidural: YES    Monitor:  Fetal Heart Tones - External and Uterine Contractions - External    Indications for instrumental delivery: none    Estimated Blood Loss:     Episiotomy: ML 2nd degree    Laceration(s):  periurethral    Laceration(s) repair: YES    Presentation: Cephalic    Fetal Description: parker    Fetal Position: Right Occiput Anterior    Birth Weight: 3365gm    Birth Length: 52.1 cm    Apgar - One Minute: 4    Apgar - Five Minutes: 6     APGAR - 10 mins- 6    Umbilical Cord: 3 vessels present  Specimens: none  Complications:  Feta; tachycardia in last 20 mins - last 5 mins up to 200           Cord Blood Results:   Information for the patient's :  iSddhartha Hitchcock [[id number]]   No results found for: PCTABR, PCTDIG, BILI, ABORH    Prenatal Labs:     Lab Results   Component Value Date/Time    HBsAg, External negative 2018    HIV, External non-reative  2018    Rubella, External Immune 2018    Gonorrhea, External negative 2018    Chlamydia, External negative 2018    GrBStrep, External Negative 2019        Attending Attestation: I was present and scrubbed for the entire procedure     live female over ML epis. vtx delivered in CLARICE position, ML epis cut to facilitate delivery of head due to maternal exhaustion-  Infant placed on maternal abdomen with good heart rate, respiratory effort, reflex- nurses assessing infant immediately. 3 vessel cord clamped x 2 by CNM then cut by FOB after 1 minute. Infant taken to warmer for further assessment. Spontaneous delivery of intact placenta.   or less by visual estimate- RN to quantify

## 2019-07-24 NOTE — H&P
History & Physical    Name: Ben Stoddard MRN: [id number]  SSN: [id number]    YOB: 1990  Age: 34 y.o. Sex: female        Subjective:     Estimated Date of Delivery: 19  OB History        1    Para        Term                AB        Living           SAB        TAB        Ectopic        Molar        Multiple        Live Births                    Ms. Gene Mar is admitted with pregnancy at 39w5d for active labor. Prenatal course was normal. Please see prenatal records for details. Patient Active Problem List    Diagnosis    Pregnancy    24 weeks gestation of pregnancy     Primary Provider: Maria Ines    EDC by  Pertinents: Transfer from [facility] (Dr. Yumi Marques). Records scanned under media. Desires midwife delivery  H/o anxiety, depression and panic disorder- starting Zoloft 25 mg 5/10/19 per pt request  HSV type 1- prophylactic valtrex 36 weeks    IOB labs: wnl  Genetic Screening: Quad neg  Anatomy: wnl. Anterior Placenta, no previa. Gender Secret! GTT: 79  Flu:  TDAP: given  Rhogam: Apos  Third Tri Labs: wnl  GBS: NEG    Pain mgmt. 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 9)     No specialty comments available.   Past Medical History:   Diagnosis Date    Abnormal Papanicolaou smear of cervix     - normal follow up    Headache     Herpes     Type 1--genital    Panic disorder     Psychiatric disorder     depression anxiety     Past Surgical History:   Procedure Laterality Date    HX ORTHOPAEDIC  2009    rt acl reconstruc    HX OTHER SURGICAL  2017    gallstones removal     Social History     Occupational History    Not on file   Tobacco Use    Smoking status: Former Smoker     Years: 5.00     Last attempt to quit: 2016     Years since quitting: 3.1    Smokeless tobacco: Never Used   Substance and Sexual Activity    Alcohol use: Not Currently    Drug use: Yes     Types: Marijuana     Comment: years ago    Sexual activity: Yes     Partners: Male     Birth control/protection: None     Family History   Problem Relation Age of Onset    Depression Mother     Depression Brother        Allergies   Allergen Reactions    Ciprofloxacin Rash    Pcn [Penicillins] Itching     Prior to Admission medications    Medication Sig Start Date End Date Taking? Authorizing Provider   acetaminophen (TYLENOL EXTRA STRENGTH) 500 mg tablet Take 500 mg by mouth every six (6) hours as needed for Pain. Yes Provider, Historical   valACYclovir (VALTREX) 1 gram tablet Take 1 Tab by mouth daily. 6/28/19  Yes Izabela Mckinney NP   sertraline (ZOLOFT) 50 mg tablet Take 1 Tab by mouth daily. 6/3/19  Yes Nichol Martinez CNM   prenatal vit calc,iron,folic (PRENATAL #2 PO) Take  by mouth. Yes Provider, Historical   sertraline (ZOLOFT) 25 mg tablet TAKE 1 TABLET BY MOUTH EVERY DAY 5/10/19   Provider, Historical        Review of Systems: A comprehensive review of systems was negative except for that written in the HPI. Objective:     Vitals:  Vitals:    07/23/19 1956 07/23/19 2010 07/23/19 2025 07/23/19 2026   BP:  111/62 128/69    Pulse:  97 (!) 102    Resp:       Temp:  98.7 °F (37.1 °C)     SpO2: 96%   97%   Weight:       Height:            Physical Exam:  SVE 3-4/70/0  Membranes:  Intact  Fetal Heart Rate: Cat 1    Prenatal Labs:   Lab Results   Component Value Date/Time    Rubella, External Immune 12/17/2018    GrBStrep, External Negative 06/27/2019    HBsAg, External negative 12/17/2018    HIV, External non-reative  12/17/2018    Gonorrhea, External negative 12/17/2018    Chlamydia, External negative 12/17/2018        Assessment/Plan:     Plan: Admit for Reassuring fetal status. Group B Strep was negative. Hx HSV- no active lesions or questionable cancers noted.  Admit pt, labs and pain management as desires.  Pt would like to have low intervention if possible, intermittent monitoring     Signed By:  Janina iMranda CNM     July 23, 2019

## 2019-07-24 NOTE — LACTATION NOTE
This note was copied from a baby's chart. Mom very sleepy during the feeding. I got baby latched and held her at the breast for the entire feeding. Baby was able to get a deep latch on both breasts. She was sucking rhythmically with frequent, audible swallows. I encouraged mom to watch the baby for feeding cues and feed whenever she is acting hungry. She can attempt to feed every 3 hours and hand express drops of colostrum into baby's mouth if baby won't latch.

## 2019-07-24 NOTE — PROGRESS NOTES
TRANSFER - IN REPORT:    Verbal report received from Via Charla Rose RN(name) on [employer]  being received from labor and delivery(unit) for routine progression of care      Report consisted of patients Situation, Background, Assessment and   Recommendations(SBAR). Information from the following report(s) SBAR, Procedure Summary, Intake/Output, MAR and Recent Results was reviewed with the receiving nurse. Opportunity for questions and clarification was provided. Assessment completed upon patients arrival to unit and care assumed.

## 2019-07-24 NOTE — PROGRESS NOTES
Labor Progress Note  Patient seen, fetal heart rate and contraction pattern evaluated, patient examined.     Physical Exam:  Cervical Exam:  7/C/0  Membranes:  small amount of clear fluid noted on bed under pt and with exam   Uterine Activity: 2-4  Fetal Heart Rate: Cat 1    Assessment/Plan:  continue current low intervention plan, recommend trying bath again as pt did well and was able to relax-  and nurse at bedside, pt coping well with her current team    Vanessa Perez CNM

## 2019-07-24 NOTE — PROGRESS NOTES
Labor Progress Note  Pt feeling more pressure and increased intensity- requesting CNM at bedside      Physical Exam:  Cervical Exam:  8/edematous  Membranes:  clear fluid  Uterine Activity: 2-4 strong to palpation   Fetal Heart Rate: Cat 1    Assessment/Plan:  pt tearful, reviewed options of position changes, active movement, spin pt, or epidural- pt willing to try alternative methods for 30 mins in attempt to reduce edema-   CNM will assist RN to help pt into T mode then spin pt

## 2019-07-24 NOTE — LACTATION NOTE
This note was copied from a baby's chart. Mother has badly damaged nipples with excoriation, bruising, blistering, and cracking. Anthony Walton's Cream ordered by Midwife. Mother instructed on use. Mother is unable to tolerate latching directly. Infant has tendency to come off and on. Nipple shield allows mother to tolerate feeding and for baby to stop habit of unlatching. Mother is pleased with use of shield. Baby feeding vigorously with rhythmic suck, swallow, breathe pattern, audible swallowing, and evident milk transfer, both breasts offered, baby is asleep following feeding.

## 2019-07-24 NOTE — PROGRESS NOTES
Labor Progress Note  CNM has remained at bedside- pt requesting SVE after spining and T mode    Physical Exam:  Cervical Exam:  Ant lip/ C/ 0 -   Membranes:  clear fluid  Uterine Activity: 2-4 stong  Fetal Heart Rate: Cat 1    Assessment/Plan:  pt coping well- recommend position changes to facilitate fetal decent   , RN and CNM will remain at bedside to assist pt with transition     Ritika Turpin CNM

## 2019-07-25 VITALS
WEIGHT: 221 LBS | RESPIRATION RATE: 16 BRPM | HEIGHT: 65 IN | SYSTOLIC BLOOD PRESSURE: 147 MMHG | DIASTOLIC BLOOD PRESSURE: 78 MMHG | HEART RATE: 97 BPM | OXYGEN SATURATION: 98 % | TEMPERATURE: 97.7 F | BODY MASS INDEX: 36.82 KG/M2

## 2019-07-25 LAB
BACTERIA SPEC CULT: ABNORMAL
BACTERIA SPEC CULT: ABNORMAL
GRAM STN SPEC: ABNORMAL
GRAM STN SPEC: ABNORMAL
SERVICE CMNT-IMP: ABNORMAL

## 2019-07-25 PROCEDURE — 74011250637 HC RX REV CODE- 250/637: Performed by: ADVANCED PRACTICE MIDWIFE

## 2019-07-25 RX ORDER — IBUPROFEN 800 MG/1
800 TABLET ORAL EVERY 8 HOURS
Qty: 90 TAB | Refills: 1 | Status: SHIPPED | OUTPATIENT
Start: 2019-07-25 | End: 2019-11-19

## 2019-07-25 RX ADMIN — IBUPROFEN 800 MG: 400 TABLET ORAL at 05:43

## 2019-07-25 RX ADMIN — HYDROCODONE BITARTRATE AND ACETAMINOPHEN 1 TABLET: 5; 325 TABLET ORAL at 02:32

## 2019-07-25 RX ADMIN — VALACYCLOVIR HYDROCHLORIDE 1000 MG: 500 TABLET, FILM COATED ORAL at 09:17

## 2019-07-25 NOTE — LACTATION NOTE
This note was copied from a baby's chart. Baby nursing well and has improved throughout post partum stay, deep latch maintained with use of shield, mother is healing with Ressie Stage Walton's Cream, milk is in transition, baby feeding vigorously with rhythmic suck, swallow, breathe pattern, with audible swallowing, and evident milk transfer, both breasts offerd, baby is asleep following feeding. Baby is feeding on demand, voiding and stools present as appropriate over the last 24 hours. Mother states that she has no further questions for Lactation Consultant before discharge.

## 2019-07-25 NOTE — DISCHARGE SUMMARY
Obstetrical Discharge Summary     Name: Kaitlin Valles MRN: [id number]  SSN: [id number]    YOB: 1990  Age: 34 y.o. Sex: female      Allergies: Ciprofloxacin and Pcn [penicillins]    Admit Date: 2019    Discharge Date: 2019     Admitting Provider: Donell Reina CNM      Attending Provider:  Donell Reina CNM      * Admission Diagnoses: Pregnancy [Z34.90]    * Discharge Diagnoses:   Information for the patient's :  [facility] [[id number]]   Delivery of a 7 lb 6.7 oz (3.365 kg) female infant via Vaginal, Spontaneous on 2019 at 7:15 PM  by Donell Reina. Apgars were 4  and 6 . Additional Diagnoses:   Hospital Problems as of 2019 Date Reviewed: 2019          Codes Class Noted - Resolved POA    Pregnancy ICD-10-CM: Z34.90  ICD-9-CM: V22.2  2019 - Present Unknown             Lab Results   Component Value Date/Time    Rubella, External Immune 2018    GrBStrep, External Negative 2019    ABO,Rh A positive 2018      Immunization History   Administered Date(s) Administered    Influenza Vaccine 10/01/2018    Tdap 2019       * Procedures:   * No surgery found *           * Discharge Condition: good    * Hospital Course: Normal hospital course following the delivery. * Disposition: Home    Discharge Medications:   Current Discharge Medication List          * Follow-up Care/Patient Instructions:   Activity: Activity as tolerated  Diet: Regular Diet  Wound Care: None needed    Follow-up Information     Follow up With Specialties Details Why Contact Info    Postpartum Support Group Social and Spiritual Support   Meets  And  Of Each Month 9:00am-10:30  [address] [place], [place] [address]behind [facility] [id number]    Breastfeeding Support Group Lactation Services   Meets  And  Each Month From 10:00-11:00  [address]  ([address])  [place] [id number]

## 2019-07-25 NOTE — DISCHARGE INSTRUCTIONS
POSTPARTUM DISCHARGE INSTRUCTIONS       Name:  Kaitlin Valles  YOB: 1990  Admission Diagnosis:  Pregnancy [Z34.90]     Discharge Diagnosis:    Problem List as of 2019 Date Reviewed: 2019          Codes Class Noted - Resolved    Pregnancy ICD-10-CM: Z34.90  ICD-9-CM: V22.2  2019 - Present        24 weeks gestation of pregnancy ICD-10-CM: Z3A.24  ICD-9-CM: V22.2  2019 - Present    Overview Addendum 2019  2:00 PM by Estefania Rock LPN     Primary Provider: Maria Ines MCNULTY  EDC by  Pertinents: Transfer from [phone] [facility] (Dr. Gene Hanks). Records scanned under media. Desires midwife delivery  H/o anxiety, depression and panic disorder- starting Zoloft 25 mg 5/10/19 per pt request  HSV type 1- prophylactic valtrex 36 weeks    IOB labs: wnl  Genetic Screening: Quad neg  Anatomy: wnl. Anterior Placenta, no previa. Gender Secret! GTT: 79  Flu:  TDAP: given  Rhogam: Apos  Third Tri Labs: wnl  GBS: NEG    Pain mgmt. 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 9) ICD-10-CM: E66.9  ICD-9-CM: 278.00  10/8/2018 - Present            Attending Physician:  Edie Pagan MD    Delivery Type:  Vaginal Childbirth with Episiotomy, Laceration or Tear: What To Expect At [address] [place] [address] will slowly heal in the next few weeks. It is easy to get too tired and overwhelmed during the first weeks after your baby is born. Changes in your hormones can shift your mood without warning. You may find it hard to meet the extra demands on your energy and time. Take it easy on yourself. Follow-up care is a key part of your treatment and safety. Be sure to make and go to all appointments, and call your doctor if you are having problems.  It's also a good idea to know your test results and keep a list of the medicines you take. How can you care for yourself at home? Vaginal Bleeding and Cramps  · After delivery, you will have a bloody discharge from the vagina. This will turn pink within a week and then white or yellow after about 10 days. It may last for 2 to 4 weeks or longer, until the uterus has healed. Use pads instead of tampons until you stop bleeding. · Do not worry if you pass some blood clots, as long as they are smaller than a golf ball. If you have a tear or stitches in your vaginal area, change the pad at least every 4 hours to prevent soreness and infection. · You may have cramps for the first few days after childbirth. These are normal and occur as the uterus shrinks to normal size. Take an over-the-counter pain medicine, such as acetaminophen (Tylenol), ibuprofen (Advil, Motrin), or naproxen (Aleve), for cramps. Read and follow all instructions on the label. Do not take aspirin, because it can cause more bleeding. Do not take acetaminophen (Tylenol) and other acetaminophen containing medications (i.e. Percocet) at the same time. Episiotomy, Lacerations or Tears  · If you have stitches, they will dissolve on their own and do not need to be removed. · Put ice or a cold pack on your painful area for 10 to 20 minutes at a time, several times a day, for the first few days. Put a thin cloth between the ice and your skin. · Sit in a few inches of warm water (sitz bath) 3 times a day and after bowel movements. The warm water helps with pain and itching. If you do not have a tub, a warm shower might help. Breast fullness  · Your breasts may overfill (engorge) in the first few days after delivery. To help milk flow and to relieve pain, warm your breasts in the shower or by using warm, moist towels before nursing. · If you are not nursing, do not put warmth on your breasts or touch your breasts.  Wear a tight bra or sports bra and use ice until the fullness goes away. This usually takes 2 to 3 days. · Put ice or a cold pack on your breast after nursing to reduce swelling and pain. Put a thin cloth between the ice and your skin. Activity  · Eat a balanced diet. Do not try to lose weight by cutting calories. Keep taking your prenatal vitamins, or take a multivitamin. · Get as much rest as you can. Try to take naps when your baby sleeps during the day. · Get some exercise every day. But do not do any heavy exercise until your doctor says it is okay. · Wait until you are healed (about 4 to 6 weeks) before you have sexual intercourse. Your doctor will tell you when it is okay to have sex. · Talk to your doctor about birth control. You can get pregnant even before your period returns. Also, you can get pregnant while you are breast-feeding. Mental Health  · Many women get the \"baby blues\" during the first few days after childbirth. You may lose sleep, feel irritable, and cry easily. You may feel happy one minute and sad the next. Hormone changes are one cause of these emotional changes. Also, the demands of a new baby, along with visits from relatives or other family needs, add to a mother's stress. The \"baby blues\" often peak around the fourth day. Then they ease up in less than 2 weeks. · If your moodiness or anxiety lasts for more than 2 weeks, or if you feel like life is not worth living, you may have postpartum depression. This is different for each mother. Some mothers with serious depression may worry intensely about their infant's well-being. Others may feel distant from their child. Some mothers might even feel that they might harm their baby. A mother may have signs of paranoia, wondering if someone is watching her. · With all the changes in your life, you may not know if you are depressed. Pregnancy sometimes causes changes in how you feel that are similar to the symptoms of depression.   · Symptoms of depression include:  · Feeling sad or hopeless and losing interest in daily activities. These are the most common symptoms of depression. · Sleeping too much or not enough. · Feeling tired. You may feel as if you have no energy. · Eating too much or too little. · POSTPARTUM SUPPORT INTERNATIONAL (PSI) offers a Warm line; Chat with the Expert phone sessions; Information and Articles about Pregnancy and Postpartum Mood Disorders; Comprehensive List of Free Support Groups; Knowledgeable local coordinators who will offer support, information, and resources; Guide to Resources on MyWants; Calendar of events in the  mood disorders community; Latest News and Research; and [facility] [address] for [employer]. Remember - You are not alone; You are not to blame; With help, you will be well. 4-486-509-PPD(9685). WWW. POSTPARTUM. NET    · Writing or talking about death, such as writing suicide notes or talking about guns, knives, or pills. Keep the numbers for these national suicide hotlines: 6-019-743-TALK (7-242.603.6741) and 3-792-ORKRPFX (8-831.568.7696). If you or someone you know talks about suicide or feeling hopeless, get help right away. Constipation and Hemorrhoids  Drink plenty of fluids, enough so that your urine is light yellow or clear like water. If you have kidney, heart, or liver disease and have to limit fluids, talk with your doctor before you increase the amount of fluids you drink. · Eat plenty of fiber each day. Have a bran muffin or bran cereal for breakfast, and try eating a piece of fruit for a mid-afternoon snack. · For painful, itchy hemorrhoids, put ice or a cold pack on the area several times a day for 10 minutes at a time. Follow this by putting a warm compress on the area for another 10 to 20 minutes or by sitting in a shallow, warm bath. When should you call for help? Call 911 anytime you think you may need emergency care.  For example, call if:  · You are thinking of hurting yourself, your baby, or anyone else. · You passed out (lost consciousness). · You have symptoms of a blood clot in your lung (called a pulmonary embolism). These may include:  · Sudden chest pain. · Trouble breathing. · Coughing up blood. Call your doctor now or seek immediate medical care if:  · You have severe vaginal bleeding. · You are soaking through a pad each hour for 2 or more hours. · Your vaginal bleeding seems to be getting heavier or is still bright red 4 days after delivery. · You are dizzy or lightheaded, or you feel like you may faint. · You are vomiting or cannot keep fluids down. · You have a fever. · You have new or more belly pain. · You pass tissue (not just blood). · Your vaginal discharge smells bad. · Your belly feels tender or full and hard. · Your breasts are continuously painful or red. · You feel sad, anxious, or hopeless for more than a few days. · You have sudden, severe pain in your belly. · You have symptoms of a blood clot in your leg (called a deep vein thrombosis), such as:  · Pain in your calf, back of the knee, thigh, or groin. · Redness and swelling in your leg or groin. · You have symptoms of preeclampsia, such as:  · Sudden swelling of your face, hands, or feet. · New vision problems (such as dimness or blurring). · A severe headache. · Your blood pressure is higher than it should be or rises suddenly. · You have new nausea or vomiting. Watch closely for changes in your health, and be sure to contact your doctor if you have any problems. Additional Information:  Postpartum Support    PARENTS:  Are you feeling sad or depressed? Is it difficult for you to enjoy yourself? Do you feel more irritable or tense? Do you feel anxious or panicky? Are you having difficulty bonding with your baby? Do you feel as if you are \"out of control\" or \"going crazy\"? Are you worried that you might hurt your baby or yourself?       FAMILIES: Do you worry that something is wrong but don't know how to help? Do you think that your partner or spouse is having problems coping? Are you worried that it may never get better? While many women experience some mild mood change or \"the blues\" during or after the birth of a child, 1 in 9 women experience more significant symptoms of depression or anxiety. 1 in 10 Dads become depressed during the first year. Things you can do  Being a good parent includes taking care of yourself. If you take care of yourself, you will be able to take better care of your baby and your family. · Talk to a counselor or healthcare provider who has training in  mood and anxiety problems. · Learn as much as you can about pregnancy and postpartum depression and anxiety. · Get support from family and friends. Ask for help when you need it. · Join a support group in your area or online. · Keep active by walking, stretching or whatever form of exercise helps you to feel better. · Get enough rest and time for yourself. · Eat a healthy diet. · Don't give up! It may take more than one try to get the right help you need. These are general instructions for a healthy lifestyle:    No smoking/ No tobacco products/ Avoid exposure to second hand smoke    Surgeon General's Warning:  Quitting smoking now greatly reduces serious risk to your health. Obesity, smoking, and sedentary lifestyle greatly increases your risk for illness    A healthy diet, regular physical exercise & weight monitoring are important for maintaining a healthy lifestyle    Recognize signs and symptoms of STROKE:    F-face looks uneven    A-arms unable to move or move unevenly    S-speech slurred or non-existent    T-time-call 911 as soon as signs and symptoms begin - DO NOT go       back to bed or wait to see if you get better - TIME IS BRAIN. I have had the opportunity to make my options or choices for discharge. I have received and understand these instructions.

## 2019-07-25 NOTE — PROGRESS NOTES
Post-Partum Day Number 2 Progress Note    Lynn Saucedo     Assessment: Doing well, post partum day 2    Plan:   1. Discharge home today  2. Follow up in office in 6 weeks with Andrea Oliveros CNM  , Daysi Baum CNM, Zhen clemente CNM  3. Post partum activity advised, diet as tolerated  4. Discharge Medications: ibuprofet and medications prior to admission    Information for the patient's :  Lisa Alcantar [[id number]]   Vaginal, Spontaneous   Patient doing well without significant complaint. Voiding without difficulty, normal lochia. Vitals:  Visit Vitals  /63 (BP 1 Location: Right arm, BP Patient Position: At rest)   Pulse 99   Temp 97.7 °F (36.5 °C)   Resp 16   Ht 5' 5\" (1.651 m)   Wt 221 lb (100.2 kg)   LMP 2018 (Exact Date)   SpO2 98%   Breastfeeding? Unknown   BMI 36.78 kg/m²     Temp (24hrs), Av.9 °F (36.6 °C), Min:97.7 °F (36.5 °C), Max:98.3 °F (36.8 °C)      Exam:         Patient without distress. Abdomen soft, fundus firm, nontender                 Lower extremities are negative for swelling, cords or tenderness.     Labs:     Lab Results   Component Value Date/Time    WBC 12.5 (H) 2019 12:36 PM    WBC 12.1 (H) 2019 01:04 PM    WBC 11.7 (H) 05/10/2019 10:15 AM    WBC 13.3 (H) 2019 05:54 PM    WBC 6.4 10/08/2018 10:03 AM    HGB 12.7 2019 12:36 PM    HGB 12.6 2019 01:04 PM    HGB 11.7 05/10/2019 10:15 AM    HGB 12.1 2019 05:54 PM    HGB 13.5 10/08/2018 10:03 AM    HCT 37.7 2019 12:36 PM    HCT 37.2 2019 01:04 PM    HCT 34.5 05/10/2019 10:15 AM    HCT 35.2 2019 05:54 PM    HCT 39.1 10/08/2018 10:03 AM    PLATELET 688  12:36 PM    PLATELET 059  01:04 PM    PLATELET 198  10:15 AM    PLATELET 294  05:54 PM    PLATELET 202  10:03 AM    Hgb, External 11.7 05/10/2019    Hgb, External 12.9 2018    Hct, External 34.5 05/10/2019    Hct, External 38.7 12/17/2018    Platelet cnt., External 207 05/10/2019    Platelet cnt., External 255 12/17/2018       No results found for this or any previous visit (from the past 24 hour(s)).

## 2019-07-25 NOTE — PROGRESS NOTES
Post-Partum Day Number 1 Progress Note    Panchito Left     Assessment: Doing well, post partum day 1    Plan:  1. Continue routine postpartum and perineal care as well as maternal education. 2. N/A     Information for the patient's :  Varsha Nuñez [[id number]]   Vaginal, Spontaneous   Patient doing well without significant complaint. Voiding without difficulty, normal lochia. Vitals:  Visit Vitals  /63 (BP 1 Location: Right arm, BP Patient Position: At rest)   Pulse 99   Temp 97.7 °F (36.5 °C)   Resp 16   Ht 5' 5\" (1.651 m)   Wt 221 lb (100.2 kg)   LMP 2018 (Exact Date)   SpO2 98%   Breastfeeding? Unknown   BMI 36.78 kg/m²     Temp (24hrs), Av.9 °F (36.6 °C), Min:97.7 °F (36.5 °C), Max:98.3 °F (36.8 °C)        Exam:   Patient without distress. Abdomen soft, fundus firm, nontender                Perineum with normal lochia noted. Lower extremities are negative for swelling, cords or tenderness.     Labs:     Lab Results   Component Value Date/Time    WBC 12.5 (H) 2019 12:36 PM    WBC 12.1 (H) 2019 01:04 PM    WBC 11.7 (H) 05/10/2019 10:15 AM    WBC 13.3 (H) 2019 05:54 PM    WBC 6.4 10/08/2018 10:03 AM    HGB 12.7 2019 12:36 PM    HGB 12.6 2019 01:04 PM    HGB 11.7 05/10/2019 10:15 AM    HGB 12.1 2019 05:54 PM    HGB 13.5 10/08/2018 10:03 AM    HCT 37.7 2019 12:36 PM    HCT 37.2 2019 01:04 PM    HCT 34.5 05/10/2019 10:15 AM    HCT 35.2 2019 05:54 PM    HCT 39.1 10/08/2018 10:03 AM    PLATELET 334  12:36 PM    PLATELET 312  01:04 PM    PLATELET 947  10:15 AM    PLATELET 310  05:54 PM    PLATELET 395  10:03 AM    Hgb, External 11.7 05/10/2019    Hgb, External 12.9 2018    Hct, External 34.5 05/10/2019    Hct, External 38.7 2018    Platelet cnt., External 207 05/10/2019    Platelet cnt., External 255 2018       No results found for this or any previous visit (from the past 24 hour(s)).

## 2019-07-25 NOTE — ROUTINE PROCESS
0730: Bedside shift change report given to CARLOZ Brower RN (oncoming nurse) by Rozina House RN (offgoing nurse). Report included the following information SBAR.   1210: Discharge instructions reviewed with pt and all questions answered. Follow up with Dr. Dee Delvalle in 5 weeks. Pt discharged in wheelchair by volunteers.

## 2019-07-25 NOTE — ROUTINE PROCESS
Bedside shift change report given to Wm Urbina RN (oncoming nurse) by Reymundo Tomlin RN (offgoing nurse). Report included the following information SBAR.

## 2019-07-30 ENCOUNTER — HOSPITAL ENCOUNTER (EMERGENCY)
Age: 29
Discharge: HOME | End: 2019-07-30
Attending: EMERGENCY MEDICINE
Payer: COMMERCIAL

## 2019-07-30 VITALS
DIASTOLIC BLOOD PRESSURE: 75 MMHG | RESPIRATION RATE: 18 BRPM | TEMPERATURE: 98.2 F | SYSTOLIC BLOOD PRESSURE: 134 MMHG | OXYGEN SATURATION: 100 % | HEART RATE: 77 BPM

## 2019-07-30 DIAGNOSIS — R55 NEAR SYNCOPE: Primary | ICD-10-CM

## 2019-07-30 LAB
ALBUMIN SERPL-MCNC: 2.9 G/DL (ref 3.5–5)
ALBUMIN/GLOB SERPL: 0.7 {RATIO} (ref 1.1–2.2)
ALP SERPL-CCNC: 100 U/L (ref 45–117)
ALT SERPL-CCNC: 120 U/L (ref 12–78)
ANION GAP SERPL CALC-SCNC: 10 MMOL/L (ref 5–15)
APPEARANCE UR: CLEAR
AST SERPL-CCNC: 102 U/L (ref 15–37)
ATRIAL RATE: 66 BPM
BACTERIA URNS QL MICRO: NEGATIVE /HPF
BASOPHILS # BLD: 0 K/UL (ref 0–0.1)
BASOPHILS NFR BLD: 0 % (ref 0–1)
BILIRUB SERPL-MCNC: 0.2 MG/DL (ref 0.2–1)
BILIRUB UR QL: NEGATIVE
BUN SERPL-MCNC: 21 MG/DL (ref 6–20)
BUN/CREAT SERPL: 24 (ref 12–20)
CALCIUM SERPL-MCNC: 8.9 MG/DL (ref 8.5–10.1)
CALCULATED P AXIS, ECG09: 59 DEGREES
CALCULATED R AXIS, ECG10: -25 DEGREES
CALCULATED T AXIS, ECG11: 21 DEGREES
CHLORIDE SERPL-SCNC: 108 MMOL/L (ref 97–108)
CO2 SERPL-SCNC: 23 MMOL/L (ref 21–32)
COLOR UR: ABNORMAL
COMMENT, HOLDF: NORMAL
CREAT SERPL-MCNC: 0.88 MG/DL (ref 0.55–1.02)
DIAGNOSIS, 93000: NORMAL
DIFFERENTIAL METHOD BLD: ABNORMAL
EOSINOPHIL # BLD: 0.2 K/UL (ref 0–0.4)
EOSINOPHIL NFR BLD: 2 % (ref 0–7)
EPITH CASTS URNS QL MICRO: ABNORMAL /LPF
ERYTHROCYTE [DISTWIDTH] IN BLOOD BY AUTOMATED COUNT: 15.3 % (ref 11.5–14.5)
GLOBULIN SER CALC-MCNC: 3.9 G/DL (ref 2–4)
GLUCOSE SERPL-MCNC: 113 MG/DL (ref 65–100)
GLUCOSE UR STRIP.AUTO-MCNC: NEGATIVE MG/DL
HCT VFR BLD AUTO: 32.8 % (ref 35–47)
HGB BLD-MCNC: 10.9 G/DL (ref 11.5–16)
HGB UR QL STRIP: ABNORMAL
HYALINE CASTS URNS QL MICRO: ABNORMAL /LPF (ref 0–5)
IMM GRANULOCYTES # BLD AUTO: 0.1 K/UL (ref 0–0.04)
IMM GRANULOCYTES NFR BLD AUTO: 1 % (ref 0–0.5)
KETONES UR QL STRIP.AUTO: NEGATIVE MG/DL
LEUKOCYTE ESTERASE UR QL STRIP.AUTO: ABNORMAL
LYMPHOCYTES # BLD: 3 K/UL (ref 0.8–3.5)
LYMPHOCYTES NFR BLD: 27 % (ref 12–49)
MCH RBC QN AUTO: 31.7 PG (ref 26–34)
MCHC RBC AUTO-ENTMCNC: 33.2 G/DL (ref 30–36.5)
MCV RBC AUTO: 95.3 FL (ref 80–99)
MONOCYTES # BLD: 0.7 K/UL (ref 0–1)
MONOCYTES NFR BLD: 6 % (ref 5–13)
NEUTS SEG # BLD: 7.2 K/UL (ref 1.8–8)
NEUTS SEG NFR BLD: 64 % (ref 32–75)
NITRITE UR QL STRIP.AUTO: NEGATIVE
NRBC # BLD: 0 K/UL (ref 0–0.01)
NRBC BLD-RTO: 0 PER 100 WBC
P-R INTERVAL, ECG05: 154 MS
PH UR STRIP: 6 [PH] (ref 5–8)
PLATELET # BLD AUTO: 242 K/UL (ref 150–400)
PMV BLD AUTO: 9.1 FL (ref 8.9–12.9)
POTASSIUM SERPL-SCNC: 3.7 MMOL/L (ref 3.5–5.1)
PROT SERPL-MCNC: 6.8 G/DL (ref 6.4–8.2)
PROT UR STRIP-MCNC: NEGATIVE MG/DL
Q-T INTERVAL, ECG07: 430 MS
QRS DURATION, ECG06: 86 MS
QTC CALCULATION (BEZET), ECG08: 450 MS
RBC # BLD AUTO: 3.44 M/UL (ref 3.8–5.2)
RBC #/AREA URNS HPF: ABNORMAL /HPF (ref 0–5)
SAMPLES BEING HELD,HOLD: NORMAL
SODIUM SERPL-SCNC: 141 MMOL/L (ref 136–145)
SP GR UR REFRACTOMETRY: 1.01 (ref 1–1.03)
UR CULT HOLD, URHOLD: NORMAL
UROBILINOGEN UR QL STRIP.AUTO: 0.2 EU/DL (ref 0.2–1)
VENTRICULAR RATE, ECG03: 66 BPM
WBC # BLD AUTO: 11.2 K/UL (ref 3.6–11)
WBC URNS QL MICRO: ABNORMAL /HPF (ref 0–4)

## 2019-07-30 PROCEDURE — 93005 ELECTROCARDIOGRAM TRACING: CPT

## 2019-07-30 PROCEDURE — 85025 COMPLETE CBC W/AUTO DIFF WBC: CPT

## 2019-07-30 PROCEDURE — 99284 EMERGENCY DEPT VISIT MOD MDM: CPT

## 2019-07-30 PROCEDURE — 96360 HYDRATION IV INFUSION INIT: CPT

## 2019-07-30 PROCEDURE — 36415 COLL VENOUS BLD VENIPUNCTURE: CPT

## 2019-07-30 PROCEDURE — 74011250636 HC RX REV CODE- 250/636: Performed by: EMERGENCY MEDICINE

## 2019-07-30 PROCEDURE — 80053 COMPREHEN METABOLIC PANEL: CPT

## 2019-07-30 PROCEDURE — 81001 URINALYSIS AUTO W/SCOPE: CPT

## 2019-07-30 RX ADMIN — SODIUM CHLORIDE 1000 ML: 900 INJECTION, SOLUTION INTRAVENOUS at 02:50

## 2019-07-30 NOTE — ED PROVIDER NOTES
34 y.o. female with past medical history significant for depression and anxiety who presents from home with chief complaint of fatigue. Pt states about 2 hours PTA to the Ed, she had onset of nausea, generalized weakness and fatigue while at rest in bed. After onset of symptoms she had the urge to have a bowel movement, which was formed but looser than normal. She further notes the onset of lightheadedness and shortness of breath. Pt states she is feeling better since arrival to the Ed, still \"does not feel right. \" Of note, the pt is currently 9 days post partum, vaginal delivery without complications. She notes prior to the onset of symptoms she was starting to feel better since the delivery. She denies any prior history of syncope. Pt specifically denies any LOC, fever, chills, headache, chest pain, abdominal pain, vomiting, diarrhea. There are no other acute medical concerns at this time. PSHx: Significant for orthopedic  Social Hx: negative tobacco use, negative EtOH use, negative Illicit Drug use    PCP: Grace Rojas NP    Note written by Beatriz Trevizo, as dictated by Germán Osborne MD 2:05 AM    The history is provided by the patient. No  was used.         Past Medical History:   Diagnosis Date    Abnormal Papanicolaou smear of cervix     2013- normal follow up    Headache     Herpes     Type 1--genital    Panic disorder     Psychiatric disorder     depression anxiety       Past Surgical History:   Procedure Laterality Date    HX ORTHOPAEDIC  05/2009    rt acl reconstruc    HX OTHER SURGICAL  01/2017    gallstones removal         Family History:   Problem Relation Age of Onset    Depression Mother     Depression Brother        Social History     Socioeconomic History    Marital status:      Spouse name: Not on file    Number of children: Not on file    Years of education: Not on file    Highest education level: Not on file   Occupational History  Not on file   Social Needs    Financial resource strain: Not on file    Food insecurity:     Worry: Not on file     Inability: Not on file    Transportation needs:     Medical: Not on file     Non-medical: Not on file   Tobacco Use    Smoking status: Former Smoker     Years: 5.00     Last attempt to quit: 06/2016     Years since quitting: 3.1    Smokeless tobacco: Never Used   Substance and Sexual Activity    Alcohol use: Not Currently    Drug use: Yes     Types: Marijuana     Comment: years ago    Sexual activity: Yes     Partners: Male     Birth control/protection: None   Lifestyle    Physical activity:     Days per week: Not on file     Minutes per session: Not on file    Stress: Not on file   Relationships    Social connections:     Talks on phone: Not on file     Gets together: Not on file     Attends Sikh service: Not on file     Active member of club or organization: Not on file     Attends meetings of clubs or organizations: Not on file     Relationship status: Not on file    Intimate partner violence:     Fear of current or ex partner: Not on file     Emotionally abused: Not on file     Physically abused: Not on file     Forced sexual activity: Not on file   Other Topics Concern     Service Not Asked    Blood Transfusions Not Asked    Caffeine Concern Not Asked    Occupational Exposure Not Asked   Arevalo Bob Hazards Not Asked    Sleep Concern Not Asked    Stress Concern Not Asked    Weight Concern Not Asked    Special Diet Not Asked    Back Care Not Asked    Exercise Not Asked    Bike Helmet Not Asked    Seat Belt Not Asked    Self-Exams Not Asked   Social History Narrative    Parents are . Found out late in life mother used donor sperm for pregnancy. ALLERGIES: Ciprofloxacin and Pcn [penicillins]    Review of Systems   Constitutional: Positive for fatigue. Negative for chills and fever. Respiratory: Positive for shortness of breath. Cardiovascular: Negative for chest pain. Gastrointestinal: Positive for nausea. Negative for abdominal pain, diarrhea and vomiting. Neurological: Positive for weakness and light-headedness. Negative for syncope and headaches. All other systems reviewed and are negative. Vitals:    07/30/19 0106   BP: 147/89   Pulse: 93   Resp: 16   Temp: 98.5 °F (36.9 °C)   SpO2: 100%            Physical Exam   Constitutional: She is oriented to person, place, and time. She appears well-developed and well-nourished. No distress. HENT:   Head: Normocephalic and atraumatic. Eyes: Conjunctivae are normal. No scleral icterus. Neck: Neck supple. No tracheal deviation present. Cardiovascular: Normal rate, regular rhythm, normal heart sounds and intact distal pulses. Exam reveals no gallop and no friction rub. No murmur heard. Pulmonary/Chest: Effort normal and breath sounds normal. She has no wheezes. She has no rales. Abdominal: Soft. She exhibits no distension. There is no tenderness. There is no rebound and no guarding. Musculoskeletal: She exhibits no edema. Neurological: She is alert and oriented to person, place, and time. Skin: Skin is warm and dry. No rash noted. Psychiatric: She has a normal mood and affect. Nursing note and vitals reviewed. Note written by Beatriz Veras, as dictated by Lexus Goss MD 2:05 AM     MDM       Procedures    ED EKG interpretation: 2254  Rhythm: normal sinus rhythm; and regular . Rate (approx.): 66; Axis: normal; ST/T wave: normal;   Note written by Beatriz Veras, as dictated by Lexus Goss MD 2:56 AM    Progress Note:  Results, treatment, and follow up plan have been discussed with patient. Questions were answered.    Vero Ramos MD  3:16 AM    A/P: Presents with nausea, generalized weakness, near syncope status post vaginal delivery 9 days ago - suspect vasovagal event; reassuring appearance and exam; VSS; CBC, UA, and CMP okay other than mild anemia and mild LFT elevation; feels better after fluids in the ED; PCP/GYN follow-up.   Anand Hutchins MD  3:18 AM

## 2019-07-30 NOTE — ED TRIAGE NOTES
Pt arrives d/t nausea and general malaise, and weakness that started about 45 minutes ago after pt was nursing. Pt gave birth on July 21st. Pt denies vomiting but reports a sudden need to have a BM with the nausea started.

## 2019-07-30 NOTE — DISCHARGE INSTRUCTIONS
Patient Education        Lightheadedness or Faintness: Care Instructions  Your Care Instructions  Lightheadedness is a feeling that you are about to faint or \"pass out. \" You do not feel as if you or your surroundings are moving. It is different from vertigo, which is the feeling that you or things around you are spinning or tilting. Lightheadedness usually goes away or gets better when you lie down. If lightheadedness gets worse, it can lead to a fainting spell. It is common to feel lightheaded from time to time. Lightheadedness usually is not caused by a serious problem. It often is caused by a short-lasting drop in blood pressure and blood flow to your head that occurs when you get up too quickly from a seated or lying position. Follow-up care is a key part of your treatment and safety. Be sure to make and go to all appointments, and call your doctor if you are having problems. It's also a good idea to know your test results and keep a list of the medicines you take. How can you care for yourself at home? · Lie down for 1 or 2 minutes when you feel lightheaded. After lying down, sit up slowly and remain sitting for 1 to 2 minutes before slowly standing up. · Avoid movements, positions, or activities that have made you lightheaded in the past.  · Get plenty of rest, especially if you have a cold or flu, which can cause lightheadedness. · Make sure you drink plenty of fluids, especially if you have a fever or have been sweating. · Do not drive or put yourself and others in danger while you feel lightheaded. When should you call for help? Call 911 anytime you think you may need emergency care. For example, call if:    · You have symptoms of a stroke. These may include:  ? Sudden numbness, tingling, weakness, or loss of movement in your face, arm, or leg, especially on only one side of your body. ? Sudden vision changes. ? Sudden trouble speaking.   ? Sudden confusion or trouble understanding simple statements. ? Sudden problems with walking or balance. ? A sudden, severe headache that is different from past headaches.     · You have symptoms of a heart attack. These may include:  ? Chest pain or pressure, or a strange feeling in the chest.  ? Sweating. ? Shortness of breath. ? Nausea or vomiting. ? Pain, pressure, or a strange feeling in the back, neck, jaw, or upper belly or in one or both shoulders or arms. ? Lightheadedness or sudden weakness. ? A fast or irregular heartbeat. After you call 911, the  may tell you to chew 1 adult-strength or 2 to 4 low-dose aspirin. Wait for an ambulance. Do not try to drive yourself.    Watch closely for changes in your health, and be sure to contact your doctor if:    · Your lightheadedness gets worse or does not get better with home care. Where can you learn more? Go to http://haven-aliza.info/. Enter A532 in the search box to learn more about \"Lightheadedness or Faintness: Care Instructions. \"  Current as of: September 23, 2018  Content Version: 12.1  © 0478-7111 [employer]. Care instructions adapted under license by [employer] (which disclaims liability or warranty for this information). If you have questions about a medical condition or this instruction, always ask your healthcare professional. [address] any warranty or liability for your use of this information.

## 2019-08-28 RX ORDER — VALACYCLOVIR HYDROCHLORIDE 1 G/1
TABLET, FILM COATED ORAL
Qty: 30 TAB | Refills: 1 | Status: SHIPPED | OUTPATIENT
Start: 2019-08-28 | End: 2020-01-08

## 2019-09-03 ENCOUNTER — OFFICE VISIT (OUTPATIENT)
Dept: OBGYN CLINIC | Age: 29
End: 2019-09-03

## 2019-09-03 VITALS
BODY MASS INDEX: 33.19 KG/M2 | SYSTOLIC BLOOD PRESSURE: 110 MMHG | WEIGHT: 199.2 LBS | HEIGHT: 65 IN | DIASTOLIC BLOOD PRESSURE: 70 MMHG

## 2019-09-03 RX ORDER — ACETAMINOPHEN AND CODEINE PHOSPHATE 120; 12 MG/5ML; MG/5ML
1 SOLUTION ORAL DAILY
Qty: 84 TAB | Refills: 2 | Status: SHIPPED | OUTPATIENT
Start: 2019-09-03 | End: 2019-11-26

## 2019-09-03 NOTE — PROGRESS NOTES
Postpartum evaluation    Heena Razo is a 34 y.o. female who presents for a postpartum exam.     She is now 6 weeks post normal spontaneous vaginal delivery. Her baby is doing well. She has had no menses since delivery. She has had the following significant problems since her delivery: none    The patient is breast feeding without difficulty. The patient would like to use mini pill for birth control. She is currently taking: no medications. She is due for her next AE in 3-6 months.      Visit Vitals  /70   Ht 5' 5\" (1.651 m)   Wt 199 lb 3.2 oz (90.4 kg)   BMI 33.15 kg/m²       PHYSICAL EXAMINATION    Constitutional  · Appearance: well-nourished, well developed, alert, in no acute distress    HENT  · Head and Face: appears normal    Gastrointestinal  · Abdominal Examination: abdomen non-tender to palpation, no masses present  · Liver and spleen: no hepatomegaly present, spleen not palpable  · Hernias: no hernias identified    Genitourinary  · External Genitalia: normal appearance for age, no discharge present, no tenderness present, no inflammatory lesions present, no masses present, no atrophy present  · Vagina: normal vaginal vault without central or paravaginal defects, no discharge present, no inflammatory lesions present, no masses present  · Bladder: non-tender to palpation  · Urethra: appears normal  · Cervix: normal   · Uterus: normal size, shape and consistency  · Adnexa: no adnexal tenderness present, no adnexal masses present  · Perineum: perineum within normal limits, no evidence of trauma, no rashes or skin lesions present  · Anus: anus within normal limits, no hemorrhoids present  · Inguinal Lymph Nodes: no lymphadenopathy present    Skin  · General Inspection: no rash, no lesions identified    Neurologic/Psychiatric  · Mental Status:  · Orientation: grossly oriented to person, place and time  · Mood and Affect: mood normal, affect appropriate    Assessment:  Normal postpartum check- may resume all normal pre pregnant acitvities    Plan:  RTO for AE.   Rx for contraception: mini pill    Andrea Oliveros CNM

## 2019-09-23 ENCOUNTER — OFFICE VISIT (OUTPATIENT)
Dept: FAMILY MEDICINE CLINIC | Age: 29
End: 2019-09-23

## 2019-09-23 VITALS
TEMPERATURE: 98.2 F | RESPIRATION RATE: 17 BRPM | WEIGHT: 192.6 LBS | HEIGHT: 65 IN | SYSTOLIC BLOOD PRESSURE: 115 MMHG | BODY MASS INDEX: 32.09 KG/M2 | DIASTOLIC BLOOD PRESSURE: 77 MMHG | OXYGEN SATURATION: 98 % | HEART RATE: 99 BPM

## 2019-09-23 DIAGNOSIS — K52.9 GASTROENTERITIS: ICD-10-CM

## 2019-09-23 DIAGNOSIS — D22.9 NEVUS: ICD-10-CM

## 2019-09-23 DIAGNOSIS — R10.10 PAIN OF UPPER ABDOMEN: ICD-10-CM

## 2019-09-23 DIAGNOSIS — F41.8 DEPRESSION WITH ANXIETY: Primary | ICD-10-CM

## 2019-09-23 DIAGNOSIS — R63.0 POOR APPETITE: ICD-10-CM

## 2019-09-23 DIAGNOSIS — F33.0 MILD EPISODE OF RECURRENT MAJOR DEPRESSIVE DISORDER (HCC): ICD-10-CM

## 2019-09-23 RX ORDER — SERTRALINE HYDROCHLORIDE 100 MG/1
100 TABLET, FILM COATED ORAL DAILY
Qty: 90 TAB | Refills: 0 | Status: SHIPPED | OUTPATIENT
Start: 2019-09-23 | End: 2019-12-27

## 2019-09-23 NOTE — PROGRESS NOTES
Chief Complaint   Patient presents with    Mole     multiple mole check     1. Have you been to the ER, urgent care clinic since your last visit? Hospitalized since your last visit? No    2. Have you seen or consulted any other health care providers outside of the [address] [place] [address] since your last visit? Include any pap smears or colon screening.  No 76 year old male transferred fro [facility] for OHS and underwent CABG x3, MVR/AVR and clipping of ALLYSON appendage.  Tolerated procedure, but continues to have limited endurance/deconditioning.   Needs supplemental oxygen. - Has a h/o COPD- on inhalers( Spiriva and albuterol, but not on home oxygen)    Patient may benefit from acute rehab: PT/OT to improve overall function/ADLs,, gait. He will be able to tolerate acute rehab program. Recent cardiac surgery and on going medical issues warrant physician oversight.   May be transferred to rehab when stable. Continue daily PT    Thank you. D/W CTS team

## 2019-09-23 NOTE — PATIENT INSTRUCTIONS
[facility] Follow up with a counselor or therapist for additional treatment of your mood. If you do not already have one, you can find a list through your insurance by calling the mental  Help line number on the back of your insurance card. [address] [facility] [phone] [phone] (does not accept medicaid/medicare) [facility] [phone]  
[employer] [place]  [phone] Learning About Mindfulness for Stress What are mindfulness and stress? Stress is what you feel when you have to handle more than you are used to. A lot of things can cause stress. You may feel stress when you go on a job interview, take a test, or run a race. This kind of short-term stress is normal and even useful. It can help you if you need to work hard or react quickly. Stress also can last a long time. Long-term stress is caused by stressful situations or events. Examples of long-term stress include long-term health problems, ongoing problems at work, and conflicts in your family. Long-term stress can harm your health. Mindfulness is a focus only on things happening in the present moment. It's a process of purposefully paying attention to and being aware of your surroundings, your emotions, your thoughts, and how your body feels. You are aware of these things, but you aren't judging these experiences as \"good\" or \"bad. \" Mindfulness can help you learn to calm your mind and body to help you cope with illness, pain, and stress. How does mindfulness help to relieve stress? Mindfulness can help quiet your mind and relax your body. Studies show that it can help some people sleep better, feel less anxious, and bring their blood pressure down. And it's been shown to help some people live and cope better with certain health problems like heart disease, depression, chronic pain, and cancer. How do you practice mindfulness? To be mindful is to pay attention, to be present, and to be accepting. · When you're mindful, you do just one thing and you pay close attention to that one thing. For example, you may sit quietly and notice your emotions or how your food tastes and smells. · When you're present, you focus on the things that are happening right now. You let go of your thoughts about the past and the future. When you dwell on the past or the future, you miss moments that can heal and strengthen you. You may miss moments like hearing a child laugh or seeing a friendly face when you think you're all alone. · When you're accepting, you don't  the present moment. Instead you accept your thoughts and feelings as they come. You can practice anytime, anywhere, and in any way you choose. You can practice in many ways. Here are a few ideas: · While doing your chores, like washing the dishes, let your mind focus on what's in your hand. What does the dish feel like? Is the water warm or cold? · Go outside and take a few deep breaths. What is the air like? Is it warm or cold? · When you can, take some time at the start of your day to sit alone and think. · Take a slow walk by yourself. Count your steps while you breathe in and out. · Try yoga breathing exercises, stretches, and poses to strengthen and relax your muscles. · At work, if you can, try to stop for a few moments each hour. Note how your body feels. Let yourself regroup and let your mind settle before you return to what you were doing. · If you struggle with anxiety or \"worry thoughts,\" imagine your mind as a blue pietro and your worry thoughts as clouds. Now imagine those worry thoughts floating across your mind's pietro. Just let them pass by as you watch. Follow-up care is a key part of your treatment and safety.  Be sure to make and go to all appointments, and call your doctor if you are having problems. It's also a good idea to know your test results and keep a list of the medicines you take. Where can you learn more? Go to http://haven-aliza.info/. Enter U317 in the search box to learn more about \"Learning About Mindfulness for Stress. \" Current as of: April 7, 2019 Content Version: 12.2 © 6259-5285 [employer]. Care instructions adapted under license by [employer] (which disclaims liability or warranty for this information). If you have questions about a medical condition or this instruction, always ask your healthcare professional. [place] [address] any warranty or liability for your use of this information.

## 2019-09-23 NOTE — PROGRESS NOTES
[facility] Note      Subjective:     Chief Complaint   Patient presents with    Mole     multiple mole check     Ben Stoddard is a 34y.o. year old female who presents for evaluation of the following:    Moles:  Multiple moles developed during pregnancy, 2 month post partum now  Location: right breast with new dark spots within the mole, not enlarging  Has been unable to see her dermatologist and she states she is worried she has melanoma   No personal history of skin cancer  Endorse maternal grandmother with cancer of nose, unknown type.      Abdominal Pain:  Onset yesterday   Now resolved  Associated poor appetite and 2 loose stools  Seen at urgent care or this yesterday  Urgent care labs:  -WBC 11.3, Lymphocytes 13.8, Hepatic funtion \"pending\"  Has history of gallstones with similar pain   Denies vomiting, blood in stools, fever, injury      Depression with Anxiety  Diagnosied 2009  Mood stressed  Tx: Sertraline 50mg-added during pregnancy given history.  -Has completed 6-week postpartum visit with OB but did not mention her mood  Felt sweaty palms last night  Trigger new qamar,  travels for work, recently learned that she was conceived with sperm donation and her known father is not her biological father, moles that she thinks are cancerous   ADRIAN: 9  3 most recent PHQ Screens 9/23/2019   Little interest or pleasure in doing things Several days   Feeling down, depressed, irritable, or hopeless Several days   Total Score PHQ 2 2   Trouble falling or staying asleep, or sleeping too much Not at all   Feeling tired or having little energy Several days   Poor appetite, weight loss, or overeating Several days   Feeling bad about yourself - or that you are a failure or have let yourself or your family down Several days   Trouble concentrating on things such as school, work, reading, or watching TV Several days   Moving or speaking so slowly that other people could have noticed; or the opposite being so fidgety that others notice Several days   Thoughts of being better off dead, or hurting yourself in some way Not at all   PHQ 9 Score 7         Social:   Works as clinical research nurse  Lives with  and  child    Review of Systems   Pertinent positives and negative per HPI. All other systems  reviewed are negative for a Comprehensive ROS (10+).        Past Medical History:   Diagnosis Date    Abnormal Papanicolaou smear of cervix     - normal follow up    Headache     Herpes     Type 1--genital    Panic disorder     Psychiatric disorder     depression anxiety        Social History     Socioeconomic History    Marital status:      Spouse name: Not on file    Number of children: Not on file    Years of education: Not on file    Highest education level: Not on file   Occupational History    Not on file   Social Needs    Financial resource strain: Not on file    Food insecurity:     Worry: Not on file     Inability: Not on file    Transportation needs:     Medical: Not on file     Non-medical: Not on file   Tobacco Use    Smoking status: Former Smoker     Years: 5.00     Last attempt to quit: 2016     Years since quitting: 3.3    Smokeless tobacco: Never Used   Substance and Sexual Activity    Alcohol use: Not Currently    Drug use: Yes     Types: Marijuana     Comment: years ago    Sexual activity: Yes     Partners: Male     Birth control/protection: None   Lifestyle    Physical activity:     Days per week: Not on file     Minutes per session: Not on file    Stress: Not on file   Relationships    Social connections:     Talks on phone: Not on file     Gets together: Not on file     Attends Sabianist service: Not on file     Active member of club or organization: Not on file     Attends meetings of clubs or organizations: Not on file     Relationship status: Not on file    Intimate partner violence:     Fear of current or ex partner: Not on file     Emotionally abused: Not on file     Physically abused: Not on file     Forced sexual activity: Not on file   Other Topics Concern     Service Not Asked    Blood Transfusions Not Asked    Caffeine Concern Not Asked    Occupational Exposure Not Asked    Hobby Hazards Not Asked    Sleep Concern Not Asked    Stress Concern Not Asked    Weight Concern Not Asked    Special Diet Not Asked    Back Care Not Asked    Exercise Not Asked    Bike Helmet Not Asked   [address] Not Asked    Self-Exams Not Asked   Social History Narrative    Parents are . Found out late in life mother used donor sperm for pregnancy. Family History   Problem Relation Age of Onset    Depression Mother     Depression Brother        Current Outpatient Medications   Medication Sig    sertraline (ZOLOFT) 50 mg tablet Take 1 Tab by mouth daily.  norethindrone (CHRISTIANA) 0.35 mg tab Take 1 Tab by mouth daily for 84 days.  valACYclovir (VALTREX) 1 gram tablet TAKE 1 TABLET BY MOUTH EVERY DAY    ibuprofen (MOTRIN) 800 mg tablet Take 1 Tab by mouth every eight (8) hours. No current facility-administered medications for this visit. Objective:     Vitals:    09/23/19 1126   BP: 115/77   Pulse: 99   Resp: 17   Temp: 98.2 °F (36.8 °C)   TempSrc: Oral   SpO2: 98%   Weight: 192 lb 9.6 oz (87.4 kg)   Height: 5' 5\" (1.651 m)       Physical Examination:  General: Alert, cooperative, no distress, appears stated age. Obese  Eyes: Conjunctivae clear. PERRL, EOMs intact. Ears: Normal external ear canals both ears. Nose: Nares normal.   Mouth/Throat: Lips, mucosa, and tongue normal.  Neck: Supple, symmetrical, trachea midline, no adenopathy. No thyroid enlargement/tenderness/nodules  Respiratory: Breathing comfortably, in no acute respiratory distress. Clear to auscultation bilaterally. Normal inspiratory and expiratory ratio.    Cardiovascular: Regular rate and rhythm, S1, S2 normal, no murmur, click, rub or gallop.   -Extremities no edema. Pulses 2+ and symmetric radial   Abdomen: Soft, non-tender, not distended. Bowel sounds normal. No masses or organomegaly. MSK: Extremities normal, atraumatic, no effusion. Gait steady and unassisted. Skin: Skin color, texture, turgor normal. No rashes or lesions on exposed skin. - multiple less than 1 cm brown macules on truck and extremities  - lesion of concern to patient: right breast 3mm brown macule oval shaped with minmal irregularity of shape, no color variegations, not tender, not bleeding.   - 2 pink papules of right breat and one on left breast measure 1-2 mm. Lymph nodes: Cervical, supraclavicular nodes normal.  Neurologic: CNII-XII intact. Strength 5/5 grossly. Sensation and reflexes normal throughout. Psychiatric: Affect flat. Mood depressed and anxious. Thoughts logical. Speech volume and speed normal      Admission on 07/30/2019, Discharged on 07/30/2019   Component Date Value Ref Range Status    WBC 07/30/2019 11.2* 3.6 - 11.0 K/uL Final    RBC 07/30/2019 3.44* 3.80 - 5.20 M/uL Final    HGB 07/30/2019 10.9* 11.5 - 16.0 g/dL Final    HCT 07/30/2019 32.8* 35.0 - 47.0 % Final    MCV 07/30/2019 95.3  80.0 - 99.0 FL Final    MCH 07/30/2019 31.7  26.0 - 34.0 PG Final    MCHC 07/30/2019 33.2  30.0 - 36.5 g/dL Final    RDW 07/30/2019 15.3* 11.5 - 14.5 % Final    PLATELET 74/14/0927 058  150 - 400 K/uL Final    MPV 07/30/2019 9.1  8.9 - 12.9 FL Final    NRBC 07/30/2019 0.0  0  WBC Final    ABSOLUTE NRBC 07/30/2019 0.00  0.00 - 0.01 K/uL Final    NEUTROPHILS 07/30/2019 64  32 - 75 % Final    LYMPHOCYTES 07/30/2019 27  12 - 49 % Final    MONOCYTES 07/30/2019 6  5 - 13 % Final    EOSINOPHILS 07/30/2019 2  0 - 7 % Final    BASOPHILS 07/30/2019 0  0 - 1 % Final    IMMATURE GRANULOCYTES 07/30/2019 1* 0.0 - 0.5 % Final    ABS. NEUTROPHILS 07/30/2019 7.2  1.8 - 8.0 K/UL Final    ABS.  LYMPHOCYTES 07/30/2019 3.0  0.8 - 3.5 K/UL Final    ABS. MONOCYTES 07/30/2019 0.7  0.0 - 1.0 K/UL Final    ABS. EOSINOPHILS 07/30/2019 0.2  0.0 - 0.4 K/UL Final    ABS. BASOPHILS 07/30/2019 0.0  0.0 - 0.1 K/UL Final    ABS. IMM. GRANS. 07/30/2019 0.1* 0.00 - 0.04 K/UL Final    DF 07/30/2019 AUTOMATED    Final    Sodium 07/30/2019 141  136 - 145 mmol/L Final    Potassium 07/30/2019 3.7  3.5 - 5.1 mmol/L Final    Chloride 07/30/2019 108  97 - 108 mmol/L Final    CO2 07/30/2019 23  21 - 32 mmol/L Final    Anion gap 07/30/2019 10  5 - 15 mmol/L Final    Glucose 07/30/2019 113* 65 - 100 mg/dL Final    BUN 07/30/2019 21* 6 - 20 MG/DL Final    Creatinine 07/30/2019 0.88  0.55 - 1.02 MG/DL Final    BUN/Creatinine ratio 07/30/2019 24* 12 - 20   Final    GFR est AA 07/30/2019 >60  >60 ml/min/1.73m2 Final    GFR est non-AA 07/30/2019 >60  >60 ml/min/1.73m2 Final    Comment: Estimated GFR is calculated using the IDMS-traceable Modification of Diet in Renal Disease (MDRD) Study equation, reported for both  Americans (GFRAA) and non- Americans (GFRNA), and normalized to 1.73m2 body surface area. The physician must decide which value applies to the patient. The MDRD study equation should only be used in individuals age 25 or older. It has not been validated for the following: pregnant women, patients with serious comorbid conditions, or on certain medications, or persons with extremes of body size, muscle mass, or nutritional status.  Calcium 07/30/2019 8.9  8.5 - 10.1 MG/DL Final    Bilirubin, total 07/30/2019 0.2  0.2 - 1.0 MG/DL Final    ALT (SGPT) 07/30/2019 120* 12 - 78 U/L Final    AST (SGOT) 07/30/2019 102* 15 - 37 U/L Final    Alk.  phosphatase 07/30/2019 100  45 - 117 U/L Final    Protein, total 07/30/2019 6.8  6.4 - 8.2 g/dL Final    Albumin 07/30/2019 2.9* 3.5 - 5.0 g/dL Final    Globulin 07/30/2019 3.9  2.0 - 4.0 g/dL Final    A-G Ratio 07/30/2019 0.7* 1.1 - 2.2   Final    Color 07/30/2019 YELLOW/STRAW    Final    Color Reference Range: Straw, Yellow or Dark Yellow    Appearance 07/30/2019 CLEAR  CLEAR   Final    Specific gravity 07/30/2019 1.008  1.003 - 1.030   Final    pH (UA) 07/30/2019 6.0  5.0 - 8.0   Final    Protein 07/30/2019 NEGATIVE   NEG mg/dL Final    Glucose 07/30/2019 NEGATIVE   NEG mg/dL Final    Ketone 07/30/2019 NEGATIVE   NEG mg/dL Final    Bilirubin 07/30/2019 NEGATIVE   NEG   Final    Blood 07/30/2019 LARGE* NEG   Final    Urobilinogen 07/30/2019 0.2  0.2 - 1.0 EU/dL Final    Nitrites 07/30/2019 NEGATIVE   NEG   Final    Leukocyte Esterase 07/30/2019 SMALL* NEG   Final    WBC 07/30/2019 0-4  0 - 4 /hpf Final    RBC 07/30/2019   0 - 5 /hpf Final    Epithelial cells 07/30/2019 FEW  FEW /lpf Final    Epithelial cell category consists of squamous cells and /or transitional urothelial cells. Renal tubular cells, if present, are separately identified as such.  Bacteria 07/30/2019 NEGATIVE   NEG /hpf Final    Hyaline cast 07/30/2019 0-2  0 - 5 /lpf Final    Urine culture hold 07/30/2019 URINE ON HOLD IN MICROBIOLOGY DEPT FOR 3 DAYS. IF UNPRESERVED URINE IS SUBMITTED, IT CANNOT BE USED FOR ADDITIONAL TESTING AFTER 24 HRS, RECOLLECTION WILL BE REQUIRED. Final    SAMPLES BEING HELD 07/30/2019 1BLUE 1RED   Final    COMMENT 07/30/2019 Add-on orders for these samples will be processed based on acceptable specimen integrity and analyte stability, which may vary by analyte.     Final    Ventricular Rate 07/30/2019 66  BPM Final    Atrial Rate 07/30/2019 66  BPM Final    P-R Interval 07/30/2019 154  ms Final    QRS Duration 07/30/2019 86  ms Final    Q-T Interval 07/30/2019 430  ms Final    QTC Calculation (Bezet) 07/30/2019 450  ms Final    Calculated P Axis 07/30/2019 59  degrees Final    Calculated R Axis 07/30/2019 -25  degrees Final    Calculated T Axis 07/30/2019 21  degrees Final    Diagnosis 07/30/2019    Final                    Value:Normal sinus rhythm with sinus arrhythmia  When compared with ECG of 01-JUN-2019 13:11,  fusion complexes are no longer present  Vent.  rate has decreased BY  40 BPM  Confirmed by Vanice Hodgkins, M.D., Mickie Garay ([id number]) on 7/30/2019 1:33:17 PM     Admission on 07/23/2019, Discharged on 07/25/2019   Component Date Value Ref Range Status    WBC 07/23/2019 12.5* 3.6 - 11.0 K/uL Final    RBC 07/23/2019 4.08  3.80 - 5.20 M/uL Final    HGB 07/23/2019 12.7  11.5 - 16.0 g/dL Final    HCT 07/23/2019 37.7  35.0 - 47.0 % Final    MCV 07/23/2019 92.4  80.0 - 99.0 FL Final    MCH 07/23/2019 31.1  26.0 - 34.0 PG Final    MCHC 07/23/2019 33.7  30.0 - 36.5 g/dL Final    RDW 07/23/2019 14.0  11.5 - 14.5 % Final    PLATELET 52/06/1819 601  150 - 400 K/uL Final    MPV 07/23/2019 10.9  8.9 - 12.9 FL Final    NRBC 07/23/2019 0.0  0  WBC Final    ABSOLUTE NRBC 07/23/2019 0.00  0.00 - 0.01 K/uL Final    Special Requests: 07/23/2019 NO SPECIAL REQUESTS    Final    GRAM STAIN 07/23/2019 NO WBC'S SEEN    Final    GRAM STAIN 07/23/2019 NO ORGANISMS SEEN    Final    Culture result: 07/23/2019 RARE STAPHYLOCOCCUS SPECIES, COAGULASE NEGATIVE*   Final    Culture result: 07/23/2019 DIPHTHEROIDS ISOLATED FROM THIO BROTH ONLY*   Final   Admission on 07/22/2019, Discharged on 07/22/2019   Component Date Value Ref Range Status    Rupture of fetal membrane 07/22/2019 Negative  Negative Preliminary    Control line present? 07/22/2019 Acceptable   Preliminary    Internal negative control 07/22/2019 Not Acceptable   Preliminary    Kit Lot No. 07/22/2019 [id number]   Preliminary   Abstract on 07/03/2019   Component Date Value Ref Range Status    GrBStrep, External 06/27/2019 Negative   Final   Routine Prenatal on 06/27/2019   Component Date Value Ref Range Status    STREP GP B CULTURE+RFLX 06/27/2019 Negative  Negative Final    Comment: Centers for Disease Control and Prevention (CDC) and [employer]  of Obstetricians and Gynecologists (ACOG) guidelines for prevention of   group B streptococcal (GBS) disease specify co-collection of  a vaginal and rectal swab specimen to maximize sensitivity of GBS  detection. Per the CDC and ACOG, swabbing both the lower vagina and  rectum substantially increases the yield of detection compared with  sampling the vagina alone. Penicillin G, ampicillin, or cefazolin are indicated for intrapartum  prophylaxis of  GBS colonization. Reflex susceptibility  testing should be performed prior to use of clindamycin only on GBS  isolates from penicillin-allergic women who are considered a high risk  for anaphylaxis. Treatment with vancomycin without additional testing  is warranted if resistance to clindamycin is noted. Assessment/ Plan:   Diagnoses and all orders for this visit:    1. Depression with anxiety  -     sertraline (ZOLOFT) 100 mg tablet; Take 1 Tab by mouth daily. 2. Mild episode of recurrent major depressive disorder ([address]    3. Nevus    4. Gastroenteritis    5. Poor appetite    6. Pain of upper abdomen        PMH reviewed per orders. Previous records requested/ reviewed. Hepatology panel pending from urgent care. Depression and anxiety is uncontrolled. Denies Si. HI. No symptoms related to care of baby but over mood down can affect this. Encouraged counseling for mood disorder. Increase sertraline to 100mg. Provided list of local behavioral specislists. Provided reassurance about normal appearing multiple simple nevus of skin. Patient still concerned. Referral to specialist for evaluation- dermatology. Abdominal pain now resolved and exam reassuring. Suspect brief gastroenteritis vs reaction to diet. Suspect patient uncontrolled anxety contributing to these symptoms. Monitor for recurrence or progression of symptoms. Provided education on healthy diet.        Total encounter time 45 minutes; >50% of time spent counseling/coordinating care regarding numerous moles, uncontrolled mood disorder management, abdominal pain. Educated patient on red flag symptoms to warrant return to clinic or emergency room visit. I have discussed the diagnosis with the patient and the intended plan as seen in the above orders. The patient has been offered or received an after-visit summary and questions were answered concerning future plans. I have discussed medication side effects and warnings with the patient as well. Follow-up and Dispositions    · Return in about 3 months (around 12/23/2019) for Follow Up with PCP.          Signed,    Lakesha Bradshaw MD  9/23/2019

## 2019-09-26 ENCOUNTER — TELEPHONE (OUTPATIENT)
Dept: FAMILY MEDICINE CLINIC | Age: 29
End: 2019-09-26

## 2019-09-26 DIAGNOSIS — D72.829 LEUKOCYTOSIS, UNSPECIFIED TYPE: Primary | ICD-10-CM

## 2019-09-26 NOTE — TELEPHONE ENCOUNTER
Call placed to patient. Name and  verified. Patient stated that [facility] and [facility]. States she was told by them to have PCP recheck labs in a few weeks. States when she saw Dr. Erica Ray she provider her with a copy. Patient is requesting lab orders to be placed. Advised I would send a message to Erica Lindsay as PCP is out of office.

## 2019-09-26 NOTE — TELEPHONE ENCOUNTER
----- Message from Ozzie Session sent at 9/26/2019 11:41 AM EDT -----  Regarding: Dr. Christie Augustine  Patient return call    Caller's first and last name and relationship (if not the patient):      Best contact number(s): [phone] leave a message      Whose call is being returned: nurse      Details to clarify the request:Please have the nurse call back with test results.       Ozzie Mckeon

## 2019-09-27 NOTE — TELEPHONE ENCOUNTER
CBC ordered to monitor mild leukocytosis recently. Patient should return in 1-2 weeks for this lab recheck.

## 2019-09-27 NOTE — TELEPHONE ENCOUNTER
Call placed to patient. Name and  verified. Advised patient lab orders were place and she can stop at the office at her convenience to have lab work drawn.  She was appreciative of call

## 2019-11-06 ENCOUNTER — TELEPHONE (OUTPATIENT)
Dept: FAMILY MEDICINE CLINIC | Age: 29
End: 2019-11-06

## 2019-11-06 NOTE — TELEPHONE ENCOUNTER
Outbound call to patient. Patient notified that request for medical records were received and sent to medical records department to give to [employer] for insurance. Patient verbalized understanding.

## 2019-11-06 NOTE — TELEPHONE ENCOUNTER
----- Message from Cele Kilgore sent at 11/6/2019 11:27 AM EST -----  Regarding: Ananya Carrera/telephone  Pt called to check the status of the life insurance forms that had been faxed to the office.  Best contact number is [phone]

## 2019-11-13 LAB
BASOPHILS # BLD AUTO: 0 X10E3/UL (ref 0–0.2)
BASOPHILS NFR BLD AUTO: 1 %
EOSINOPHIL # BLD AUTO: 0.1 X10E3/UL (ref 0–0.4)
EOSINOPHIL NFR BLD AUTO: 1 %
ERYTHROCYTE [DISTWIDTH] IN BLOOD BY AUTOMATED COUNT: 12 % (ref 12.3–15.4)
HCT VFR BLD AUTO: 40 % (ref 34–46.6)
HGB BLD-MCNC: 13.6 G/DL (ref 11.1–15.9)
IMM GRANULOCYTES # BLD AUTO: 0 X10E3/UL (ref 0–0.1)
IMM GRANULOCYTES NFR BLD AUTO: 0 %
LYMPHOCYTES # BLD AUTO: 1.7 X10E3/UL (ref 0.7–3.1)
LYMPHOCYTES NFR BLD AUTO: 23 %
MCH RBC QN AUTO: 31.1 PG (ref 26.6–33)
MCHC RBC AUTO-ENTMCNC: 34 G/DL (ref 31.5–35.7)
MCV RBC AUTO: 92 FL (ref 79–97)
MONOCYTES # BLD AUTO: 0.6 X10E3/UL (ref 0.1–0.9)
MONOCYTES NFR BLD AUTO: 8 %
NEUTROPHILS # BLD AUTO: 4.9 X10E3/UL (ref 1.4–7)
NEUTROPHILS NFR BLD AUTO: 67 %
PLATELET # BLD AUTO: 240 X10E3/UL (ref 150–450)
RBC # BLD AUTO: 4.37 X10E6/UL (ref 3.77–5.28)
WBC # BLD AUTO: 7.4 X10E3/UL (ref 3.4–10.8)

## 2019-11-19 ENCOUNTER — OFFICE VISIT (OUTPATIENT)
Dept: FAMILY MEDICINE CLINIC | Age: 29
End: 2019-11-19

## 2019-11-19 VITALS
HEIGHT: 65 IN | WEIGHT: 193 LBS | DIASTOLIC BLOOD PRESSURE: 71 MMHG | HEART RATE: 83 BPM | TEMPERATURE: 98.5 F | OXYGEN SATURATION: 98 % | BODY MASS INDEX: 32.15 KG/M2 | SYSTOLIC BLOOD PRESSURE: 110 MMHG | RESPIRATION RATE: 16 BRPM

## 2019-11-19 DIAGNOSIS — J06.9 VIRAL URI: ICD-10-CM

## 2019-11-19 DIAGNOSIS — D72.829 LEUKOCYTOSIS, UNSPECIFIED TYPE: Primary | ICD-10-CM

## 2019-11-19 DIAGNOSIS — F33.0 MILD EPISODE OF RECURRENT MAJOR DEPRESSIVE DISORDER (HCC): ICD-10-CM

## 2019-11-19 LAB
BASOPHILS # BLD AUTO: 0 X10E3/UL (ref 0–0.2)
BASOPHILS NFR BLD AUTO: 0 %
EOSINOPHIL # BLD AUTO: 0.1 X10E3/UL (ref 0–0.4)
EOSINOPHIL NFR BLD AUTO: 2 %
ERYTHROCYTE [DISTWIDTH] IN BLOOD BY AUTOMATED COUNT: 12.4 % (ref 12.3–15.4)
HCT VFR BLD AUTO: 41 % (ref 34–46.6)
HGB BLD-MCNC: 13.9 G/DL (ref 11.1–15.9)
IMM GRANULOCYTES # BLD AUTO: 0 X10E3/UL (ref 0–0.1)
IMM GRANULOCYTES NFR BLD AUTO: 0 %
LYMPHOCYTES # BLD AUTO: 1.7 X10E3/UL (ref 0.7–3.1)
LYMPHOCYTES NFR BLD AUTO: 25 %
MCH RBC QN AUTO: 30.8 PG (ref 26.6–33)
MCHC RBC AUTO-ENTMCNC: 33.9 G/DL (ref 31.5–35.7)
MCV RBC AUTO: 91 FL (ref 79–97)
MONOCYTES # BLD AUTO: 0.6 X10E3/UL (ref 0.1–0.9)
MONOCYTES NFR BLD AUTO: 9 %
NEUTROPHILS # BLD AUTO: 4.3 X10E3/UL (ref 1.4–7)
NEUTROPHILS NFR BLD AUTO: 64 %
PLATELET # BLD AUTO: 247 X10E3/UL (ref 150–450)
RBC # BLD AUTO: 4.52 X10E6/UL (ref 3.77–5.28)
WBC # BLD AUTO: 6.7 X10E3/UL (ref 3.4–10.8)

## 2019-11-19 NOTE — PROGRESS NOTES
Chief Complaint   Patient presents with    Pressure Behind the Eyes    Neck Pain    Sore Throat    Generalized Body Aches     1. Have you been to the ER, urgent care clinic since your last visit? Hospitalized since your last visit? No    2. Have you seen or consulted any other health care providers outside of the [address] [place] [address] since your last visit? Include any pap smears or colon screening.  No

## 2019-11-19 NOTE — PATIENT INSTRUCTIONS

## 2019-11-19 NOTE — LETTER
NOTIFICATION RETURN TO WORK / SCHOOL 
 
11/19/2019 12:27 PM 
 
Ms. Tone Casaonva [facility] [address] [phone] To Whom It May Concern: 
 
Tone Casanova is currently under the care of NILDA Mccann. She will return to work/school on: 11/21/19 If there are questions or concerns please have the patient contact our office. Sincerely, Cristhian Gaines NP

## 2019-11-19 NOTE — LETTER
NOTIFICATION RETURN TO WORK / SCHOOL 
 
11/19/2019 12:28 PM 
 
Ms. Mary Arroyo [phone] To Whom It May Concern: 
 
Mary Arroyo is currently under the care of NILDA Mccann. She will return to work/school on: 11/20/19 If there are questions or concerns please have the patient contact our office. Sincerely, Jean Potter NP

## 2019-11-19 NOTE — PROGRESS NOTES
Assessment/Plan:     Diagnoses and all orders for this visit:    1. Leukocytosis, unspecified type  -     CBC WITH AUTOMATED DIFF  Repeat although she is currently acutely ill so likely to influence results. 2. Mild episode of recurrent major depressive disorder ([facility]  Discussed medication compliance techniques and goal setting. 3. Viral URI  Treat symptomatically including fluids and rest.  Avoid decongestants if a history of high blood pressure. Follow-up and Dispositions    · Return in about 3 months (around 2/19/2020) for Follow Up. Discussed expected course/resolution/complications of diagnosis in detail with patient. Medication risks/benefits/costs/interactions/alternatives discussed with patient. Pt was given after visit summary which includes diagnoses, current medications & vitals. Pt expressed understanding with the diagnosis and plan          Subjective:      Tony Hunt is a 34 y.o. female who presents for had concerns including Pressure Behind the Eyes; Neck Pain; Sore Throat; and Generalized Body Aches. Reports a history of sore throat, generalized body aches, and facial pain for several weeks. There is a history of leukocytosis and she is interested in repeat evaluation at this time. Reports mood as worsening. Has been taking Zoloft irregularly. Is starting back with counseling. Reports childcare difficulties have added to stress and  travels frequently for work. Denies SI/HI. Patient Active Problem List   Diagnosis Code    Mild episode of recurrent major depressive disorder ([facility] F33.0    Obesity (BMI 30-39. 9) E66.9    24 weeks gestation of pregnancy Z3A.24    Pregnancy Z34.90       Current Outpatient Medications   Medication Sig Dispense Refill    PNV73-iron carb,glu-FA-dss-dha (CITRANATAL ASSURE) 35 mg iron-1 mg -50 mg-300 mg cmpk CitraNatal Assure 35 mg iron-1 mg-50 mg-300 mg oral pack   one cap po daily      sertraline (ZOLOFT) 100 mg tablet Take 1 Tab by mouth daily. 90 Tab 0    norethindrone (CHRISTIANA) 0.35 mg tab Take 1 Tab by mouth daily for 84 days. 84 Tab 2    valACYclovir (VALTREX) 1 gram tablet TAKE 1 TABLET BY MOUTH EVERY DAY 30 Tab 1       Allergies   Allergen Reactions    Ciprofloxacin Rash    Pcn [Penicillins] Itching       ROS:   Review of Systems   Constitutional: Negative for chills, fever and malaise/fatigue. HENT: Positive for sore throat. Negative for congestion, ear pain and sinus pain. Respiratory: Positive for cough. Negative for sputum production, shortness of breath and wheezing. Cardiovascular: Negative for chest pain. Neurological: Negative for seizures. Endo/Heme/Allergies: Negative for environmental allergies. Psychiatric/Behavioral: Positive for depression. Negative for hallucinations, substance abuse and suicidal ideas. The patient is not nervous/anxious. Objective:     Visit Vitals  /71   Pulse 83   Temp 98.5 °F (36.9 °C) (Oral)   Resp 16   Ht 5' 5\" (1.651 m)   Wt 193 lb (87.5 kg)   SpO2 98%   BMI 32.12 kg/m²       Vitals and Nurse Documentation reviewed. Physical Exam   Constitutional: No distress. HENT:   Right Ear: Tympanic membrane is not erythematous and not bulging. No middle ear effusion. Left Ear: Tympanic membrane is not erythematous and not bulging. No middle ear effusion. Nose: No rhinorrhea. Right sinus exhibits no maxillary sinus tenderness and no frontal sinus tenderness. Left sinus exhibits no maxillary sinus tenderness and no frontal sinus tenderness. Mouth/Throat: No oropharyngeal exudate or posterior oropharyngeal erythema. Eyes: EOM and lids are normal.   Cardiovascular: S1 normal and S2 normal. Exam reveals no gallop and no friction rub. No murmur heard. Pulmonary/Chest: Breath sounds normal. She has no wheezes. Lymphadenopathy:     She has no cervical adenopathy. Skin: Skin is warm and dry.    Psychiatric: Mood and affect normal.

## 2019-11-25 ENCOUNTER — TELEPHONE (OUTPATIENT)
Dept: FAMILY MEDICINE CLINIC | Age: 29
End: 2019-11-25

## 2019-11-25 NOTE — TELEPHONE ENCOUNTER
Outbound call to patient. Patients date of birth verified. Patient made aware of lab results and verbalized understanding. Patient would like to know if she will need an ultrasound on her on her neck.

## 2019-11-25 NOTE — TELEPHONE ENCOUNTER
----- Message from Arianna Route sent at 11/25/2019 10:37 AM EST -----  Regarding: PARISH Rojas/telephone  Contact: [phone]  Reason for call: Labs  Callback required yes/no and why: Yes, pt would like PARISH Rojas or her nurse to call back, pt has a question about her labs, wants to know if an appt. needs be scheduled for her to have an ultrasound of her neck or not.    Best contact number(s): [phone]  Details to clarify the request:

## 2019-11-26 NOTE — TELEPHONE ENCOUNTER
Outbound call to patient. Patient made aware that if symptoms persist for 4 more weeks than provider would request an updated ultrasound. Patient verbalized understanding.

## 2019-12-02 ENCOUNTER — PATIENT MESSAGE (OUTPATIENT)
Dept: FAMILY MEDICINE CLINIC | Age: 29
End: 2019-12-02

## 2019-12-02 DIAGNOSIS — R59.0 LEFT CERVICAL LYMPHADENOPATHY: Primary | ICD-10-CM

## 2019-12-03 NOTE — TELEPHONE ENCOUNTER
From: Rich Rios  To: Sheree Prason NP  Sent: 12/2/2019 9:34 PM EST  Subject: Visit Follow-Up Question    Bradley Hare,    I'm still feeling these nodules all over my left side especially that one in my neck that I swear is hard, fixed, and it's been there for a long time (months). You said to tell you if I still felt something there despite having normal labs and you would be willing to order an ultrasound of my lymph nodes. So- I feel the one on the front of my left neck all the time and I also have some enlarged on my left clavicle and my left arm/neck is also terribly sore again. I would just really like to know if everything is musculoskeletal definitively and not something more serious. Would really appreciate knowing.     Thank you,  Rich Rios

## 2019-12-09 ENCOUNTER — HOSPITAL ENCOUNTER (OUTPATIENT)
Dept: ULTRASOUND IMAGING | Age: 29
Discharge: HOME | End: 2019-12-09
Attending: NURSE PRACTITIONER
Payer: COMMERCIAL

## 2019-12-09 DIAGNOSIS — R59.0 LEFT CERVICAL LYMPHADENOPATHY: ICD-10-CM

## 2019-12-09 PROCEDURE — 76536 US EXAM OF HEAD AND NECK: CPT

## 2019-12-17 ENCOUNTER — OFFICE VISIT (OUTPATIENT)
Dept: FAMILY MEDICINE CLINIC | Age: 29
End: 2019-12-17

## 2019-12-17 VITALS
DIASTOLIC BLOOD PRESSURE: 60 MMHG | BODY MASS INDEX: 32.39 KG/M2 | RESPIRATION RATE: 18 BRPM | HEIGHT: 65 IN | WEIGHT: 194.4 LBS | HEART RATE: 89 BPM | SYSTOLIC BLOOD PRESSURE: 110 MMHG | OXYGEN SATURATION: 98 % | TEMPERATURE: 98.3 F

## 2019-12-17 DIAGNOSIS — R59.9 ENLARGED LYMPH NODE: Primary | ICD-10-CM

## 2019-12-17 DIAGNOSIS — H53.8 BLURRED VISION: ICD-10-CM

## 2019-12-17 DIAGNOSIS — M26.629 TMJ SYNDROME: ICD-10-CM

## 2019-12-17 NOTE — PROGRESS NOTES
Patient Name: Gavin Brumfield   MRN: [id number]    Jared Flower is a 34 y.o. female who presents with the following:     She has had ongoing enlarged lymph nodes in her neck since having a viral URI earlier this month. Her PCP checked a CBC which was within normal limits as well as an ultrasound which showed normal thyroid and some prominent enlarged lymph nodes. Recommend to continue monitoring. She states that she still feels the lymph nodes and is concerned about it. Often has to push very hard along her right neck to find the lymph node. Denies any fevers, weight loss, URI symptoms. Has an upcoming appointment with a sleep medicine doctor to address her TMJ symptoms. Has noticed for the past week that her vision seems blurry especially at nighttime. Her old prescription glasses do not help anymore. She is 5 months postpartum and recalls that her vision became blurry in the third trimester. Review of Systems   Constitutional: Negative for fever, malaise/fatigue and weight loss. Eyes: Positive for blurred vision. Negative for double vision, photophobia, pain, discharge and redness. Respiratory: Negative for cough, hemoptysis, shortness of breath and wheezing. Cardiovascular: Negative for chest pain, palpitations, leg swelling and PND. Gastrointestinal: Negative for abdominal pain, constipation, diarrhea, nausea and vomiting. Musculoskeletal: Negative for neck pain. The patient's medications, allergies, past medical history, surgical history, family history and social history were reviewed and updated where appropriate. Prior to Admission medications    Medication Sig Start Date End Date Taking? Authorizing Provider   PNV73-iron carb,glu-FA-dss-dha (CITRANATAL ASSURE) 35 mg iron-1 mg -50 mg-300 mg cmpk CitraNatal Assure 35 mg iron-1 mg-50 mg-300 mg oral pack   one cap po daily   Yes Provider, Historical   sertraline (ZOLOFT) 100 mg tablet Take 1 Tab by mouth daily. 9/23/19   Susy Clayton MD   valACYclovir (VALTREX) 1 gram tablet TAKE 1 TABLET BY MOUTH EVERY DAY  Patient taking differently: Take 1,000 mg by mouth daily. 8/28/19   Vic Chamorro NP       Allergies   Allergen Reactions    Ciprofloxacin Rash    Pcn [Penicillins] Itching         OBJECTIVE    Visit Vitals  /60 (BP 1 Location: Right arm, BP Patient Position: Sitting)   Pulse 89   Temp 98.3 °F (36.8 °C) (Oral)   Resp 18   Ht 5' 5\" (1.651 m)   Wt 194 lb 6.4 oz (88.2 kg)   SpO2 98%   BMI 32.35 kg/m²       Physical Exam  Vitals signs and nursing note reviewed. Constitutional:       General: She is not in acute distress. Appearance: She is not diaphoretic. Eyes:      Conjunctiva/sclera: Conjunctivae normal.      Pupils: Pupils are equal, round, and reactive to light. Neck:      Musculoskeletal: Full passive range of motion without pain, normal range of motion and neck supple. Thyroid: No thyroid mass, thyromegaly or thyroid tenderness. Musculoskeletal: Normal range of motion. Lymphadenopathy:      Head:      Right side of head: No submental, submandibular, tonsillar, preauricular, posterior auricular or occipital adenopathy. Left side of head: No submental, submandibular, tonsillar, preauricular, posterior auricular or occipital adenopathy. Cervical: No cervical adenopathy. Skin:     General: Skin is warm and dry. Neurological:      Mental Status: She is alert and oriented to person, place, and time. Gait: Gait is intact. Psychiatric:         Mood and Affect: Mood and affect normal.         Cognition and Memory: Memory normal.         Judgment: Judgment normal.           ASSESSMENT AND PLAN  Nallely Garrett is a 34 y.o. female who presents today for:    1. Enlarged lymph node  Advised pt to stop palpating her lymph nodes and let its resolve on its own. No other symptoms and had reassuring US. Can monitor in the future with repeat US.     2. TMJ syndrome  - SLEEP MEDICINE REFERRAL    3. Blurred vision  Possibly due to postpartum/pregnancy vision changes. Pt to see [facility] tomorrow. There are no discontinued medications. Medication risks/benefits/costs/interactions/alternatives discussed with patient. Advised patient to call back or return to office if symptoms worsen/change/persist. If patient cannot reach us or should anything more severe/urgent arise he/she should proceed directly to the nearest emergency department. Discussed expected course/resolution/complications of diagnosis in detail with patient. Patient given a written after visit summary which includes his/her diagnoses, current medications and vitals. Patient expressed understanding with the diagnosis and plan. Blanco Shelton M.D.

## 2019-12-17 NOTE — PROGRESS NOTES
Chief Complaint   Patient presents with    Other     swollen lymph nodes around neck    Blurred Vision     mostly at night     1. Have you been to the ER, urgent care clinic since your last visit? Hospitalized since your last visit? No    2. Have you seen or consulted any other health care providers outside of the [address] [place] [address] since your last visit? Include any pap smears or colon screening.  No

## 2019-12-17 NOTE — PATIENT INSTRUCTIONS
Swollen Lymph Nodes: Care Instructions Your Care Instructions Lymph nodes are small, bean-shaped glands throughout the body. They help your body fight germs and infections. Lymph nodes often swell when there is a problem such as an injury, infection, or tumor. · The nodes in your neck, under your chin, or behind your ears may swell when you have a cold or sore throat. · An injury or infection in a leg or foot can make the nodes in your groin swell. · Sometimes medicine can make lymph nodes swell, but this is rare. Treatment depends on what caused your nodes to swell. Usually the nodes return to normal size without a problem. Follow-up care is a key part of your treatment and safety. Be sure to make and go to all appointments, and call your doctor if you are having problems. It's also a good idea to know your test results and keep a list of the medicines you take. How can you care for yourself at home? · Take your medicines exactly as prescribed. Call your doctor if you think you are having a problem with your medicine. · Avoid irritation. ? Do not squeeze or pick at the lump. ? Do not stick a needle in it. · Prevent infection. Do not squeeze, drain, or puncture a painful lump. Doing this can irritate or inflame the lump, push any existing infection deeper into the skin, or cause severe bleeding. · Get extra rest. Slow down just a little from your usual routine. · Drink plenty of fluids, enough so that your urine is light yellow or clear like water. If you have kidney, heart, or liver disease and have to limit fluids, talk with your doctor before you increase the amount of fluids you drink. · Take an over-the-counter pain medicine, such as acetaminophen (Tylenol), ibuprofen (Advil, Motrin), or naproxen (Aleve). Read and follow all instructions on the label. · Do not take two or more pain medicines at the same time unless the doctor told you to.  Many pain medicines have acetaminophen, which is Tylenol. Too much acetaminophen (Tylenol) can be harmful. When should you call for help? Call your doctor now or seek immediate medical care if: 
  · You have worse symptoms of infection, such as: 
? Increased pain, swelling, warmth, or redness. ? Red streaks leading from the area. ? Pus draining from the area. ? A fever.  
 Watch closely for changes in your health, and be sure to contact your doctor if: 
  · Your lymph nodes do not get smaller or do not return to normal.  
  · You do not get better as expected. Where can you learn more? Go to http://haven-aliza.info/. Enter F405 in the search box to learn more about \"Swollen Lymph Nodes: Care Instructions. \" Current as of: June 9, 2019 Content Version: 12.2 © 4251-0921 [employer]. Care instructions adapted under license by [employer] (which disclaims liability or warranty for this information). If you have questions about a medical condition or this instruction, always ask your healthcare professional. [place] [address] any warranty or liability for your use of this information.

## 2019-12-23 ENCOUNTER — TELEPHONE (OUTPATIENT)
Dept: FAMILY MEDICINE CLINIC | Age: 29
End: 2019-12-23

## 2019-12-23 NOTE — TELEPHONE ENCOUNTER
----- Message from Aguila Mobley sent at 12/20/2019  6:59 PM EST -----  Regarding: [facility] Rojas/Telephone  General Message    Caller's first and last name: self    Reason for call: Requesting a recommendation for a cold medicine while breast feeding    Callback required yes/no and why: no    Best contact number(s): [phone]    Details to clarify the request: Pt advised she can receive a message via [employer]

## 2019-12-23 NOTE — TELEPHONE ENCOUNTER
Outbound call to patient. Patient made aware that Delsym and cough drops are ok per NP. Bob to take while breast feeding. Patient verbalized understanding.

## 2020-01-08 ENCOUNTER — OFFICE VISIT (OUTPATIENT)
Dept: FAMILY MEDICINE CLINIC | Age: 30
End: 2020-01-08

## 2020-01-08 VITALS
DIASTOLIC BLOOD PRESSURE: 59 MMHG | HEART RATE: 63 BPM | HEIGHT: 65 IN | SYSTOLIC BLOOD PRESSURE: 101 MMHG | OXYGEN SATURATION: 99 % | BODY MASS INDEX: 32.15 KG/M2 | TEMPERATURE: 97.9 F | RESPIRATION RATE: 17 BRPM | WEIGHT: 193 LBS

## 2020-01-08 DIAGNOSIS — M26.609 TMJ (TEMPOROMANDIBULAR JOINT DISORDER): ICD-10-CM

## 2020-01-08 DIAGNOSIS — R10.812 LEFT UPPER QUADRANT ABDOMINAL TENDERNESS WITHOUT REBOUND TENDERNESS: ICD-10-CM

## 2020-01-08 DIAGNOSIS — Z13.220 SCREENING, LIPID: ICD-10-CM

## 2020-01-08 DIAGNOSIS — Z91.012 EGG ALLERGY: ICD-10-CM

## 2020-01-08 DIAGNOSIS — Z00.00 ROUTINE GENERAL MEDICAL EXAMINATION AT A HEALTH CARE FACILITY: Primary | ICD-10-CM

## 2020-01-08 NOTE — PATIENT INSTRUCTIONS
Well Visit, Ages 25 to 48: Care Instructions  Your Care Instructions    Physical exams can help you stay healthy. Your doctor has checked your overall health and may have suggested ways to take good care of yourself. He or she also may have recommended tests. At home, you can help prevent illness with healthy eating, regular exercise, and other steps. Follow-up care is a key part of your treatment and safety. Be sure to make and go to all appointments, and call your doctor if you are having problems. It's also a good idea to know your test results and keep a list of the medicines you take. How can you care for yourself at home? · Reach and stay at a healthy weight. This will lower your risk for many problems, such as obesity, diabetes, heart disease, and high blood pressure. · Get at least 30 minutes of physical activity on most days of the week. Walking is a good choice. You also may want to do other activities, such as running, swimming, cycling, or playing tennis or team sports. Discuss any changes in your exercise program with your doctor. · Do not smoke or allow others to smoke around you. If you need help quitting, talk to your doctor about stop-smoking programs and medicines. These can increase your chances of quitting for good. · Talk to your doctor about whether you have any risk factors for sexually transmitted infections (STIs). Having one sex partner (who does not have STIs and does not have sex with anyone else) is a good way to avoid these infections. · Use birth control if you do not want to have children at this time. Talk with your doctor about the choices available and what might be best for you. · Protect your skin from too much sun. When you're outdoors from 10 a.m. to 4 p.m., stay in the shade or cover up with clothing and a hat with a wide brim. Wear sunglasses that block UV rays. Even when it's cloudy, put broad-spectrum sunscreen (SPF 30 or higher) on any exposed skin.   · See a dentist one or two times a year for checkups and to have your teeth cleaned. · Wear a seat belt in the car. Follow your doctor's advice about when to have certain tests. These tests can spot problems early. For everyone  · Cholesterol. Have the fat (cholesterol) in your blood tested after age 21. Your doctor will tell you how often to have this done based on your age, family history, or other things that can increase your risk for heart disease. · Blood pressure. Have your blood pressure checked during a routine doctor visit. Your doctor will tell you how often to check your blood pressure based on your age, your blood pressure results, and other factors. · Vision. Talk with your doctor about how often to have a glaucoma test.  · Diabetes. Ask your doctor whether you should have tests for diabetes. · Colon cancer. Your risk for colorectal cancer gets higher as you get older. Some experts say that adults should start regular screening at age 48 and stop at age 76. Others say to start before age 48 or continue after age 76. Talk with your doctor about your risk and when to start and stop screening. For women  · Breast exam and mammogram. Talk to your doctor about when you should have a clinical breast exam and a mammogram. Medical experts differ on whether and how often women under 50 should have these tests. Your doctor can help you decide what is right for you. · Cervical cancer screening test and pelvic exam. Begin with a Pap test at age 24. The test often is part of a pelvic exam. Starting at age 27, you may choose to have a Pap test, an HPV test, or both tests at the same time (called co-testing). Talk with your doctor about how often to have testing. · Tests for sexually transmitted infections (STIs). Ask whether you should have tests for STIs. You may be at risk if you have sex with more than one person, especially if your partners do not wear condoms.   For men  · Tests for sexually transmitted infections (STIs). Ask whether you should have tests for STIs. You may be at risk if you have sex with more than one person, especially if you do not wear a condom. · Testicular cancer exam. Ask your doctor whether you should check your testicles regularly. · Prostate exam. Talk to your doctor about whether you should have a blood test (called a PSA test) for prostate cancer. Experts differ on whether and when men should have this test. Some experts suggest it if you are older than 39 and are -American or have a father or brother who got prostate cancer when he was younger than 72. When should you call for help? Watch closely for changes in your health, and be sure to contact your doctor if you have any problems or symptoms that concern you. Where can you learn more? Go to http://haven-aliza.info/. Enter P072 in the search box to learn more about \"Well Visit, Ages 25 to 48: Care Instructions. \"  Current as of: December 13, 2018  Content Version: 12.2  © 2955-2940 [employer]. Care instructions adapted under license by [employer] (which disclaims liability or warranty for this information). If you have questions about a medical condition or this instruction, always ask your healthcare professional. [place] [address] any warranty or liability for your use of this information.

## 2020-01-08 NOTE — PROGRESS NOTES
Assessment/ Plan:   Full age appropriate History and Physical exam as well as health care maintenance  performed and discussed today. Risk factor modification discussed today includes safe sex practices, healthy diet and exercise, and seat belt use. Continue current medications. Diagnoses and all orders for this visit:    1. Routine general medical examination at a health care facility  -     CBC WITH AUTOMATED DIFF  -     METABOLIC PANEL, COMPREHENSIVE  -     TSH 3RD GENERATION  Labs pending. Up to date on routine care. 2. TMJ (temporomandibular joint disorder)  -     REFERRAL TO DENTISTRY for appointment upcoming. 3. Egg allergy  -     FOOD ALLERGY PROFILE    4. Screening, lipid  -     LIPID PANEL    5. Left upper quadrant abdominal tenderness without rebound tenderness  Possibly a small hiatal hernia. Consider GI referral if no improvement. Follow-up and Dispositions    · Return in about 6 months (around 7/8/2020) for Follow Up. Discussed expected course/resolution/complications of diagnosis in detail with patient.    Medication risks/benefits/costs/interactions/alternatives discussed with patient.    Pt was given after visit summary which includes diagnoses, current medications & vitals. Pt expressed understanding with the diagnosis and plan      HPI:   Jones Hidalgo is a 34 y.o. female who presents for annual exam.    She is following a practitioner for lymphatic drainage in the neck. She will be seen once monthly. She is followed by Dr. Rach Cárdenas, ob/gyn. She is breastfeeding her 11 month old daughter. She is not currently exercising. She is not on a specific diet plan. She reports LUQ abdominal pain and associated nausea with manipulation. Symptoms are waxing and waning. This is her first evaluation. Symptoms have been present for weeks stable over time. Denies history of injury.      Reports a recent rash with egg ingestion and is interested in further evaluation. Current Outpatient Medications   Medication Sig Dispense Refill    PNV73-iron carb,glu-FA-dss-dha (CITRANATAL ASSURE) 35 mg iron-1 mg -50 mg-300 mg cmpk CitraNatal Assure 35 mg iron-1 mg-50 mg-300 mg oral pack   one cap po daily        Allergies   Allergen Reactions    Ciprofloxacin Rash    Pcn [Penicillins] Itching      Patient Active Problem List   Diagnosis Code    Mild episode of recurrent major depressive disorder (HCC) F33.0    Obesity (BMI 30-39. 9) E66.9    24 weeks gestation of pregnancy Z3A.24    Pregnancy Z34.90     Past Medical History:   Diagnosis Date    Abnormal Papanicolaou smear of cervix     2013- normal follow up    Headache     Herpes     Type 1--genital    Panic disorder     Psychiatric disorder     depression anxiety      Past Surgical History:   Procedure Laterality Date    HX ORTHOPAEDIC  05/2009    rt acl reconstruc    HX OTHER SURGICAL  01/2017    gallstones removal      Patient's last menstrual period was 10/01/2018 (approximate).    Family History   Problem Relation Age of Onset    Depression Mother     Depression Brother       Social History     Socioeconomic History    Marital status:      Spouse name: Not on file    Number of children: Not on file    Years of education: Not on file    Highest education level: Not on file   Occupational History    Not on file   Social Needs    Financial resource strain: Not on file    Food insecurity:     Worry: Not on file     Inability: Not on file    Transportation needs:     Medical: Not on file     Non-medical: Not on file   Tobacco Use    Smoking status: Former Smoker     Years: 5.00     Last attempt to quit: 06/2016     Years since quitting: 3.6    Smokeless tobacco: Never Used   Substance and Sexual Activity    Alcohol use: Not Currently    Drug use: Yes     Types: Marijuana     Comment: years ago    Sexual activity: Yes     Partners: Male     Birth control/protection: None   Lifestyle    Physical activity:     Days per week: Not on file     Minutes per session: Not on file    Stress: Not on file   Relationships    Social connections:     Talks on phone: Not on file     Gets together: Not on file     Attends Hinduism service: Not on file     Active member of club or organization: Not on file     Attends meetings of clubs or organizations: Not on file     Relationship status: Not on file    Intimate partner violence:     Fear of current or ex partner: Not on file     Emotionally abused: Not on file     Physically abused: Not on file     Forced sexual activity: Not on file   Other Topics Concern     Service Not Asked    Blood Transfusions Not Asked    Caffeine Concern Not Asked    Occupational Exposure Not Asked   [place] Hazards Not Asked    Sleep Concern Not Asked    Stress Concern Not Asked    Weight Concern Not Asked    Special Diet Not Asked    Back Care Not Asked    Exercise Not Asked    Bike Helmet Not Asked   [address] Not Asked    Self-Exams Not Asked   Social History Narrative    Parents are . Found out late in life mother used donor sperm for pregnancy. ROS:     Review of Systems   Constitutional: Negative for fever, malaise/fatigue and weight loss. HENT: Negative for hearing loss. Eyes: Negative for blurred vision and pain. Respiratory: Negative for cough and shortness of breath. Cardiovascular: Negative for chest pain, palpitations and leg swelling. Gastrointestinal: Positive for abdominal pain. Negative for blood in stool, constipation, diarrhea and melena. Genitourinary: Negative for dysuria and hematuria. Musculoskeletal: Negative for joint pain. Skin: Negative for rash. Neurological: Negative for headaches. Psychiatric/Behavioral: Negative for depression. The patient is not nervous/anxious and does not have insomnia.         Physical Exam:     Visit Vitals  /59   Pulse 63   Temp 97.9 °F (36.6 °C) (Oral)   Resp 17   Ht 5' 5\" (1.651 m)   Wt 193 lb (87.5 kg)   LMP 10/01/2018 (Approximate)   SpO2 99%   BMI 32.12 kg/m²        Nursing Documentation and Vital Signs Reviewed. Physical Exam  Constitutional:       General: She is not in acute distress. Appearance: She is obese. HENT:      Right Ear: No drainage. No middle ear effusion. Tympanic membrane is not injected, erythematous or bulging. Left Ear: No drainage. No middle ear effusion. Tympanic membrane is not injected, erythematous or bulging. Nose: No mucosal edema or rhinorrhea. Mouth/Throat:      Dentition: Normal dentition. Pharynx: No oropharyngeal exudate or posterior oropharyngeal erythema. Tonsils: No tonsillar abscesses. Eyes:      Pupils: Pupils are equal, round, and reactive to light. Neck:      Thyroid: No thyroid mass or thyromegaly. Vascular: No carotid bruit or JVD. Trachea: No tracheal deviation. Cardiovascular:      Pulses:           Dorsalis pedis pulses are 2+ on the right side and 2+ on the left side. Posterior tibial pulses are 2+ on the right side and 2+ on the left side. Heart sounds: S1 normal and S2 normal. No murmur. No friction rub. No gallop. Pulmonary:      Breath sounds: Normal breath sounds. No wheezing. Abdominal:      General: Bowel sounds are normal. There is no distension. Palpations: Abdomen is soft. There is no hepatomegaly, splenomegaly or mass. Tenderness: There is no tenderness. Lymphadenopathy:      Cervical: No cervical adenopathy. Skin:     General: Skin is warm and dry. Neurological:      Cranial Nerves: No cranial nerve deficit. Sensory: Sensation is intact. Motor: Motor function is intact. Gait: Gait is intact.    Psychiatric:         Mood and Affect: Mood and affect normal.         Cognition and Memory: Memory normal.         Judgment: Judgment normal.

## 2020-01-08 NOTE — PROGRESS NOTES
Chief Complaint   Patient presents with    Complete Physical    Skin Problem     spot on stomach     Referral Follow Up     needed for TMJ         1. Have you been to the ER, urgent care clinic since your last visit? Hospitalized since your last visit? No    2. Have you seen or consulted any other health care providers outside of the [address] [place] [address] since your last visit? Include any pap smears or colon screening.  No

## 2020-01-11 LAB
ALBUMIN SERPL-MCNC: 4.5 G/DL (ref 3.5–5.5)
ALBUMIN/GLOB SERPL: 1.7 {RATIO} (ref 1.2–2.2)
ALP SERPL-CCNC: 78 IU/L (ref 39–117)
ALT SERPL-CCNC: 12 IU/L (ref 0–32)
AST SERPL-CCNC: 14 IU/L (ref 0–40)
BASOPHILS # BLD AUTO: 0 X10E3/UL (ref 0–0.2)
BASOPHILS NFR BLD AUTO: 1 %
BILIRUB SERPL-MCNC: 0.3 MG/DL (ref 0–1.2)
BUN SERPL-MCNC: 19 MG/DL (ref 6–20)
BUN/CREAT SERPL: 26 (ref 9–23)
CALCIUM SERPL-MCNC: 9.9 MG/DL (ref 8.7–10.2)
CHLORIDE SERPL-SCNC: 103 MMOL/L (ref 96–106)
CHOLEST SERPL-MCNC: 159 MG/DL (ref 100–199)
CLAM IGE QN: <0.1 KU/L
CO2 SERPL-SCNC: 25 MMOL/L (ref 20–29)
CODFISH IGE QN: <0.1 KU/L
CORN IGE QN: <0.1 KU/L
COW MILK IGE QN: <0.1 KU/L
CREAT SERPL-MCNC: 0.74 MG/DL (ref 0.57–1)
EGG WHITE IGE QN: <0.1 KU/L
EOSINOPHIL # BLD AUTO: 0.1 X10E3/UL (ref 0–0.4)
EOSINOPHIL NFR BLD AUTO: 2 %
ERYTHROCYTE [DISTWIDTH] IN BLOOD BY AUTOMATED COUNT: 12.5 % (ref 11.7–15.4)
GLOBULIN SER CALC-MCNC: 2.7 G/DL (ref 1.5–4.5)
GLUCOSE SERPL-MCNC: 90 MG/DL (ref 65–99)
HCT VFR BLD AUTO: 38.6 % (ref 34–46.6)
HDLC SERPL-MCNC: 60 MG/DL
HGB BLD-MCNC: 13.1 G/DL (ref 11.1–15.9)
IMM GRANULOCYTES # BLD AUTO: 0 X10E3/UL (ref 0–0.1)
IMM GRANULOCYTES NFR BLD AUTO: 1 %
INTERPRETATION, 910389: NORMAL
LDLC SERPL CALC-MCNC: 94 MG/DL (ref 0–99)
LYMPHOCYTES # BLD AUTO: 1.8 X10E3/UL (ref 0.7–3.1)
LYMPHOCYTES NFR BLD AUTO: 31 %
Lab: NORMAL
MCH RBC QN AUTO: 30.7 PG (ref 26.6–33)
MCHC RBC AUTO-ENTMCNC: 33.9 G/DL (ref 31.5–35.7)
MCV RBC AUTO: 90 FL (ref 79–97)
MONOCYTES # BLD AUTO: 0.5 X10E3/UL (ref 0.1–0.9)
MONOCYTES NFR BLD AUTO: 8 %
NEUTROPHILS # BLD AUTO: 3.3 X10E3/UL (ref 1.4–7)
NEUTROPHILS NFR BLD AUTO: 57 %
PEANUT IGE QN: <0.1 KU/L
PLATELET # BLD AUTO: 229 X10E3/UL (ref 150–450)
POTASSIUM SERPL-SCNC: 5.1 MMOL/L (ref 3.5–5.2)
PROT SERPL-MCNC: 7.2 G/DL (ref 6–8.5)
RBC # BLD AUTO: 4.27 X10E6/UL (ref 3.77–5.28)
SCALLOP IGE QN: <0.1 KU/L
SESAME SEED IGE QN: <0.1 KU/L
SHRIMP IGE QN: <0.1 KU/L
SODIUM SERPL-SCNC: 143 MMOL/L (ref 134–144)
SOYBEAN IGE QN: <0.1 KU/L
TRIGL SERPL-MCNC: 26 MG/DL (ref 0–149)
TSH SERPL DL<=0.005 MIU/L-ACNC: 1.91 UIU/ML (ref 0.45–4.5)
VLDLC SERPL CALC-MCNC: 5 MG/DL (ref 5–40)
WALNUT IGE QN: <0.1 KU/L
WBC # BLD AUTO: 5.8 X10E3/UL (ref 3.4–10.8)
WHEAT IGE QN: <0.1 KU/L

## 2020-01-14 ENCOUNTER — OFFICE VISIT (OUTPATIENT)
Dept: FAMILY MEDICINE CLINIC | Age: 30
End: 2020-01-14

## 2020-01-14 ENCOUNTER — OFFICE VISIT (OUTPATIENT)
Dept: OBGYN CLINIC | Age: 30
End: 2020-01-14

## 2020-01-14 VITALS
TEMPERATURE: 97.9 F | RESPIRATION RATE: 18 BRPM | SYSTOLIC BLOOD PRESSURE: 118 MMHG | BODY MASS INDEX: 33.09 KG/M2 | HEIGHT: 65 IN | WEIGHT: 198.6 LBS | DIASTOLIC BLOOD PRESSURE: 74 MMHG | OXYGEN SATURATION: 98 % | HEART RATE: 78 BPM

## 2020-01-14 VITALS
SYSTOLIC BLOOD PRESSURE: 118 MMHG | HEIGHT: 65 IN | WEIGHT: 187.8 LBS | DIASTOLIC BLOOD PRESSURE: 70 MMHG | RESPIRATION RATE: 18 BRPM | BODY MASS INDEX: 31.29 KG/M2

## 2020-01-14 DIAGNOSIS — R53.81 MALAISE: Primary | ICD-10-CM

## 2020-01-14 DIAGNOSIS — R59.9 ENLARGED LYMPH NODE: ICD-10-CM

## 2020-01-14 DIAGNOSIS — R59.9 LYMPH NODE ENLARGEMENT: ICD-10-CM

## 2020-01-14 DIAGNOSIS — R63.39 BREAST FEEDING PROBLEM IN INFANT: ICD-10-CM

## 2020-01-14 DIAGNOSIS — E66.9 EXCESSIVE SUBCUTANEOUS FAT: Primary | ICD-10-CM

## 2020-01-14 RX ORDER — CLINDAMYCIN HYDROCHLORIDE 300 MG/1
300 CAPSULE ORAL 3 TIMES DAILY
Qty: 30 CAP | Refills: 0 | Status: SHIPPED | OUTPATIENT
Start: 2020-01-14 | End: 2020-01-20

## 2020-01-14 NOTE — PROGRESS NOTES
Chief Complaint   Patient presents with   [address] behind right knee. Patient just noticed recently.  Gland Swelling     around ears and neck. Patient is going to see ENT on Monday. 1. Have you been to the ER, urgent care clinic since your last visit? Hospitalized since your last visit? No    2. Have you seen or consulted any other health care providers outside of the [address] [place] [address] since your last visit? Include any pap smears or colon screening.  No

## 2020-01-14 NOTE — PROGRESS NOTES
Chief Complaint   Patient presents with    Breast Problem     Pt states left breast didn't produce much. Pt c/o of left sided lymph node swelling, from ear down to left  Breast. Pt states nodes also in left armpit.since last Friday. Pt states left nipple inverted. Albert King is a 34 y.o. female who complains of  Left sided lymph node swelling. She developed this problem approximately four days ago. Her relevant past medical history:   Past Medical History:   Diagnosis Date    Abnormal Papanicolaou smear of cervix     2013- normal follow up    Headache     Herpes     Type 1--genital    Panic disorder     Psychiatric disorder     depression anxiety        Past Surgical History:   Procedure Laterality Date    HX ORTHOPAEDIC  05/2009    rt acl reconstruc    HX OTHER SURGICAL  01/2017    gallstones removal     Social History     Occupational History    Not on file   Tobacco Use    Smoking status: Former Smoker     Years: 5.00     Last attempt to quit: 06/2016     Years since quitting: 3.6    Smokeless tobacco: Never Used   Substance and Sexual Activity    Alcohol use: Not Currently    Drug use: Yes     Types: Marijuana     Comment: years ago    Sexual activity: Yes     Partners: Male     Birth control/protection: None     Family History   Problem Relation Age of Onset    Depression Mother     Depression Brother        Allergies   Allergen Reactions    Ciprofloxacin Rash    Pcn [Penicillins] Itching     Prior to Admission medications    Medication Sig Start Date End Date Taking?  Authorizing Provider   PNV73-iron carb,glu-FA-dss-dha (CITRANATAL ASSURE) 35 mg iron-1 mg -50 mg-300 mg cmpk CitraNatal Assure 35 mg iron-1 mg-50 mg-300 mg oral pack   one cap po daily   Yes Provider, Historical        Review of Systems - History obtained from the patient  Constitutional: negative for weight loss, fever, night sweats  HEENT: negative for hearing loss, earache, congestion, snoring, sorethroat  CV: negative for chest pain, palpitations, edema  Resp: negative for cough, shortness of breath, wheezing  Breast: negative for breast lumps, nipple discharge, galactorrhea  GI: negative for change in bowel habits, abdominal pain, black or bloody stools  : negative for frequency, dysuria, hematuria  MSK: negative for back pain, joint pain, muscle pain  Skin: negative for itching, rash, hives  Neuro: negative for dizziness, headache, confusion, weakness  Psych: negative for anxiety, depression, change in mood  Heme/lymph: negative for bleeding, bruising, pallor      Objective:  Visit Vitals  /70   Resp 18   Ht 5' 5\" (1.651 m)   Wt 187 lb 12.8 oz (85.2 kg)   LMP 10/14/2018   Breastfeeding Yes   BMI 31.25 kg/m²          PHYSICAL EXAMINATION    Constitutional  · Appearance: well-nourished, well developed, alert, in no acute distress    HENT  · Head and Face: appears normal    Neck  · Inspection/Palpation: normal appearance, no masses or tenderness  · Lymph Nodes: Positive lymphadenopathy noted to left jaw line  · Thyroid: gland size normal, nontender, no nodules or masses present on palpation  ·   Breasts  · Inspection of Breasts: breasts symmetrical, no skin changes, no discharge present, nipple appearance normal, no skin retraction present  · Palpation of Breasts and Axillae: no masses present on palpation, no breast tenderness  · Axillary Lymph Nodes: no lymphadenopathy present    Skin  · General Inspection: no rash, no lesions identified    Neurologic/Psychiatric  · Mental Status:  · Orientation: grossly oriented to person, place and time  · Mood and Affect: mood normal, affect appropriate    Assessment:    mild enlargement of left lymph node chain under left jaw line( seen by pcp a few days ago with and ultrasound of the area)  Breast exam normal,  Reports decreased milk production.     Plan:   Seeing ent early next week  Reviewed with Dr Norah Remy  CBC and rx for clindamycin with education

## 2020-01-14 NOTE — PROGRESS NOTES
Patient Name: Dorothy Clark   MRN: [id number]    Leno Medina is a 34 y.o. female who presents with the following:     Pt was putting on lotion behind her right knee today and noticed some fine bumps on the back of her knee. Denies pain, limping, injury. Has had enlarged lymph nodes around her left ear for several weeks. Saw her OB today who gave her clindamycin for possible mastitis. Seeing ENT next week for lymph node enlargement. Review of Systems   Constitutional: Negative for fever, malaise/fatigue and weight loss. Respiratory: Negative for cough, hemoptysis, shortness of breath and wheezing. Cardiovascular: Negative for chest pain, palpitations, leg swelling and PND. Gastrointestinal: Negative for abdominal pain, constipation, diarrhea, nausea and vomiting. The patient's medications, allergies, past medical history, surgical history, family history and social history were reviewed and updated where appropriate. Prior to Admission medications    Medication Sig Start Date End Date Taking? Authorizing Provider   clindamycin (CLEOCIN) 300 mg capsule Take 1 Cap by mouth three (3) times daily for 10 days. 1/14/20 1/24/20 Yes Mahsa Mosley CNM   PNV73-iron carb,glu-FA-dss-dha (CITRANATAL ASSURE) 35 mg iron-1 mg -50 mg-300 mg cmpk CitraNatal Assure 35 mg iron-1 mg-50 mg-300 mg oral pack   one cap po daily   Yes Provider, Historical       Allergies   Allergen Reactions    Ciprofloxacin Rash    Pcn [Penicillins] Itching         OBJECTIVE    Visit Vitals  /74 (BP 1 Location: Left arm, BP Patient Position: Sitting)   Pulse 78   Temp 97.9 °F (36.6 °C) (Oral)   Resp 18   Ht 5' 5\" (1.651 m)   Wt 198 lb 9.6 oz (90.1 kg)   SpO2 98%   BMI 33.05 kg/m²       Physical Exam  Vitals signs and nursing note reviewed. Constitutional:       General: She is not in acute distress. Appearance: She is not diaphoretic.    Eyes:      Conjunctiva/sclera: Conjunctivae normal.      Pupils: Pupils are equal, round, and reactive to light. Musculoskeletal:      Right knee: Normal. She exhibits normal range of motion, no swelling and no effusion. No tenderness found. Left knee: Normal. She exhibits normal range of motion, no swelling and no effusion. No tenderness found. Comments: Very fine granular texture behind the knee, no tenderness or warmth   Lymphadenopathy:      Head:      Right side of head: No submental, submandibular, tonsillar or posterior auricular adenopathy. Left side of head: No submental, submandibular, tonsillar or posterior auricular adenopathy. Skin:     General: Skin is warm and dry. Neurological:      Mental Status: She is alert and oriented to person, place, and time. Gait: Gait is intact. Psychiatric:         Mood and Affect: Mood and affect normal.         Cognition and Memory: Memory normal.         Judgment: Judgment normal.           ASSESSMENT AND PLAN  Holland Brantley is a 34 y.o. female who presents today for:    1. Excessive subcutaneous fat  No gross abnormality on exam; suspect possible excessive subcutaneous fat. Pt to watch for worsening symptoms such as swelling, pain, or affecting gait. 2. Enlarged lymph node  Exam unremarkable today. Continue abx per OB and f/u with ENT. There are no discontinued medications. Medication risks/benefits/costs/interactions/alternatives discussed with patient. Advised patient to call back or return to office if symptoms worsen/change/persist. If patient cannot reach us or should anything more severe/urgent arise he/she should proceed directly to the nearest emergency department. Discussed expected course/resolution/complications of diagnosis in detail with patient. Patient given a written after visit summary which includes his/her diagnoses, current medications and vitals. Patient expressed understanding with the diagnosis and plan. Blanco Judge M.D.

## 2020-01-15 ENCOUNTER — TELEPHONE (OUTPATIENT)
Dept: FAMILY MEDICINE CLINIC | Age: 30
End: 2020-01-15

## 2020-01-15 LAB
ERYTHROCYTE [DISTWIDTH] IN BLOOD BY AUTOMATED COUNT: 12.8 % (ref 11.7–15.4)
HCT VFR BLD AUTO: 38.4 % (ref 34–46.6)
HGB BLD-MCNC: 13.3 G/DL (ref 11.1–15.9)
MCH RBC QN AUTO: 31.7 PG (ref 26.6–33)
MCHC RBC AUTO-ENTMCNC: 34.6 G/DL (ref 31.5–35.7)
MCV RBC AUTO: 92 FL (ref 79–97)
PLATELET # BLD AUTO: 236 X10E3/UL (ref 150–450)
RBC # BLD AUTO: 4.19 X10E6/UL (ref 3.77–5.28)
WBC # BLD AUTO: 6.9 X10E3/UL (ref 3.4–10.8)

## 2020-01-15 NOTE — TELEPHONE ENCOUNTER
Outbound call to patient. Patient states that she is having some fatigue and burning on urination since starting the clindamycin. Patient would like to know since her labs came back normal previously should she still take the medication.     Please advise

## 2020-01-15 NOTE — TELEPHONE ENCOUNTER
----- Message from Chris Backer sent at 1/15/2020  3:07 PM EST -----  Regarding: [facility] Rojas/Telephone  Contact: [phone]  Caller's first and last name: n/a  Reason for call: Medication Concern   Callback required yes/no and why: Yes   Best contact number(s): [phone]  Details to clarify the request: Pt was prescribed \"Clindamycin\" for a breast infection and is having extreme fatigue. Pt wants to speak with NP regarding this medication and its effects.

## 2020-01-16 NOTE — TELEPHONE ENCOUNTER
Outbound call to patient. Left message on patients personal voicemail that an appointment would be required for reevaluation. , and to call the office schedule an appointment at [phone]

## 2020-01-20 ENCOUNTER — HOSPITAL ENCOUNTER (EMERGENCY)
Age: 30
Discharge: HOME | End: 2020-01-20
Attending: EMERGENCY MEDICINE
Payer: COMMERCIAL

## 2020-01-20 ENCOUNTER — NURSE TRIAGE (OUTPATIENT)
Dept: OTHER | Facility: CLINIC | Age: 30
End: 2020-01-20

## 2020-01-20 ENCOUNTER — APPOINTMENT (OUTPATIENT)
Dept: GENERAL RADIOLOGY | Age: 30
End: 2020-01-20
Attending: EMERGENCY MEDICINE
Payer: COMMERCIAL

## 2020-01-20 VITALS
WEIGHT: 188.27 LBS | HEART RATE: 61 BPM | OXYGEN SATURATION: 99 % | DIASTOLIC BLOOD PRESSURE: 78 MMHG | SYSTOLIC BLOOD PRESSURE: 117 MMHG | TEMPERATURE: 98.5 F | BODY MASS INDEX: 31.33 KG/M2 | RESPIRATION RATE: 17 BRPM

## 2020-01-20 DIAGNOSIS — S93.492A SPRAIN OF ANTERIOR TALOFIBULAR LIGAMENT OF LEFT ANKLE, INITIAL ENCOUNTER: Primary | ICD-10-CM

## 2020-01-20 PROCEDURE — 73630 X-RAY EXAM OF FOOT: CPT

## 2020-01-20 PROCEDURE — 73610 X-RAY EXAM OF ANKLE: CPT

## 2020-01-20 PROCEDURE — 99283 EMERGENCY DEPT VISIT LOW MDM: CPT

## 2020-01-20 NOTE — ED TRIAGE NOTES
Patient arrives with c/o left foot pain onset last night around 9pm. Patient said she fell down 3 brick steps. Took Motrin about 1 hour ago with relief.

## 2020-01-20 NOTE — TELEPHONE ENCOUNTER
Patient is calling because she fell around 2100 yesterday and injured her left ankle. Went home and elevated, iced and used compression with ineffective result. Patient has now developed a pins and needle sensation to 4th and 5th toes and outer part of left foot. Redness noted. Cant see if it swollen. Current pain level is 5/10 currently without moving and taking ibuprofen. Patient was able to bear weight last time she attempted to stand.        Reason for Disposition   [1] Numbness (new loss of sensation) of toe(s) AND [2] present now    Protocols used: FOOT AND ANKLE INJURY-ADULT-

## 2020-01-20 NOTE — LETTER
[address] 
[facility] 
[address] [address] [phone] Work/School Note Date: 1/20/2020 To Whom It May concern: 
 
Kade Freeman was seen and treated today in the emergency room by the following provider(s): 
Attending Provider: Adamaris Monzon DO. Kade Freeman may return to work on 1/21/20. Sincerely, Cooper Lucas DO

## 2020-01-20 NOTE — ED PROVIDER NOTES
19-year-old female with no significant past medical history presents to the emergency department for evaluation of left ankle injury which occurred around 9 PM last night. Reportedly the patient was stepping down 3 brick steps in front of her house when she stepped wrong and had a inversion type ankle injury to her left ankle causing her to fall. Patient denies any head injury or LOC or any other injuries in her fall. She states that she took 800mg ibuprofen prior to arrival has significantly improved her pain, however she has had significant difficulty with weightbearing on her foot and ankle since the injury and requests x-ray evaluation. She has noted some mild swelling over the lateral malleolus and notes a tingling sensation around the lateral aspect of her foot with the swelling and pain. No knee pain or other significant injuries.            Past Medical History:   Diagnosis Date    Abnormal Papanicolaou smear of cervix     2013- normal follow up    Headache     Herpes     Type 1--genital    Panic disorder     Psychiatric disorder     depression anxiety       Past Surgical History:   Procedure Laterality Date    HX ORTHOPAEDIC  05/2009    rt acl reconstruc    HX OTHER SURGICAL  01/2017    gallstones removal         Family History:   Problem Relation Age of Onset    Depression Mother     Depression Brother        Social History     Socioeconomic History    Marital status:      Spouse name: Not on file    Number of children: Not on file    Years of education: Not on file    Highest education level: Not on file   Occupational History    Not on file   Social Needs    Financial resource strain: Not on file    Food insecurity:     Worry: Not on file     Inability: Not on file    Transportation needs:     Medical: Not on file     Non-medical: Not on file   Tobacco Use    Smoking status: Former Smoker     Years: 5.00     Last attempt to quit: 06/2016     Years since quitting: 3.6    Smokeless tobacco: Never Used   Substance and Sexual Activity    Alcohol use: Yes    Drug use: Not Currently     Comment: years ago    Sexual activity: Yes     Partners: Male     Birth control/protection: None   Lifestyle    Physical activity:     Days per week: Not on file     Minutes per session: Not on file    Stress: Not on file   Relationships    Social connections:     Talks on phone: Not on file     Gets together: Not on file     Attends Hinduism service: Not on file     Active member of club or organization: Not on file     Attends meetings of clubs or organizations: Not on file     Relationship status: Not on file    Intimate partner violence:     Fear of current or ex partner: Not on file     Emotionally abused: Not on file     Physically abused: Not on file     Forced sexual activity: Not on file   Other Topics Concern     Service Not Asked    Blood Transfusions Not Asked    Caffeine Concern Not Asked    Occupational Exposure Not Asked   [address] Hazards Not Asked    Sleep Concern Not Asked    Stress Concern Not Asked    Weight Concern Not Asked    Special Diet Not Asked    Back Care Not Asked    Exercise Not Asked    Bike Helmet Not Asked   [address] Not Asked    Self-Exams Not Asked   Social History Narrative    Parents are . Found out late in life mother used donor sperm for pregnancy. ALLERGIES: Ciprofloxacin and Pcn [penicillins]    Review of Systems   Constitutional: Positive for activity change. Negative for appetite change, chills and fever. HENT: Negative for congestion, rhinorrhea, sinus pressure, sneezing and sore throat. Eyes: Negative for photophobia and visual disturbance. Respiratory: Negative for cough and shortness of breath. Cardiovascular: Negative for chest pain. Gastrointestinal: Negative for abdominal pain, blood in stool, constipation, diarrhea, nausea and vomiting.    Genitourinary: Negative for difficulty urinating, dysuria, flank pain, frequency, hematuria, menstrual problem, urgency, vaginal bleeding and vaginal discharge. Musculoskeletal: Positive for arthralgias (Left ankle), gait problem and joint swelling. Negative for back pain, myalgias and neck pain. Skin: Negative for rash and wound. Neurological: Negative for syncope, weakness, numbness and headaches. Psychiatric/Behavioral: Negative for self-injury and suicidal ideas. All other systems reviewed and are negative. Vitals:    01/20/20 0409   BP: 110/72   Pulse: 67   Resp: 16   Temp: 98.5 °F (36.9 °C)   SpO2: 97%   Weight: 85.4 kg (188 lb 4.4 oz)            Physical Exam  Vitals signs and nursing note reviewed. Constitutional:       General: She is not in acute distress. Appearance: She is well-developed. She is not diaphoretic. HENT:      Head: Normocephalic and atraumatic. Nose: Nose normal.   Eyes:      Conjunctiva/sclera: Conjunctivae normal.      Pupils: Pupils are equal, round, and reactive to light. Neck:      Musculoskeletal: Neck supple. Cardiovascular:      Rate and Rhythm: Normal rate and regular rhythm. Heart sounds: Normal heart sounds. Pulmonary:      Effort: Pulmonary effort is normal.      Breath sounds: Normal breath sounds. Abdominal:      General: There is no distension. Palpations: Abdomen is soft. Tenderness: There is no tenderness. Musculoskeletal:         General: Swelling (Very mild swelling over the lateral malleolus) and tenderness present. Left ankle: She exhibits decreased range of motion and swelling. She exhibits no ecchymosis, no deformity, no laceration and normal pulse. Tenderness. Lateral malleolus and AITFL tenderness found. No medial malleolus, no head of 5th metatarsal and no proximal fibula tenderness found. Achilles tendon normal.   Skin:     General: Skin is warm and dry. Neurological:      Mental Status: She is alert and oriented to person, place, and time.       GCS: GCS eye subscore is 4. GCS verbal subscore is 5. GCS motor subscore is 6. Cranial Nerves: No cranial nerve deficit. Sensory: No sensory deficit. Coordination: Coordination normal.          MDM   22-year-old female presents with inversion ankle sprain injury. X-ray of her left ankle and left foot were reviewed by myself and read by radiology showing no acute abnormalities. Feel that symptoms are associated with ankle sprain. Given air splint and crutches. Patient states that she has plenty of 800 mg ibuprofens at home and declines Rx for more. Recommended rice and gradual reintroduction of weightbearing with range of motion exercises. Rec'd PCP f/u. Return precautions were given for worsening or concerns.        Procedures

## 2020-01-26 ENCOUNTER — NURSE TRIAGE (OUTPATIENT)
Dept: OTHER | Facility: CLINIC | Age: 30
End: 2020-01-26

## 2020-01-27 ENCOUNTER — OFFICE VISIT (OUTPATIENT)
Dept: FAMILY MEDICINE CLINIC | Age: 30
End: 2020-01-27

## 2020-01-27 VITALS
OXYGEN SATURATION: 97 % | HEIGHT: 65 IN | TEMPERATURE: 98.3 F | HEART RATE: 90 BPM | DIASTOLIC BLOOD PRESSURE: 64 MMHG | WEIGHT: 181.8 LBS | RESPIRATION RATE: 17 BRPM | BODY MASS INDEX: 30.29 KG/M2 | SYSTOLIC BLOOD PRESSURE: 104 MMHG

## 2020-01-27 DIAGNOSIS — K21.9 GASTROESOPHAGEAL REFLUX DISEASE, ESOPHAGITIS PRESENCE NOT SPECIFIED: ICD-10-CM

## 2020-01-27 DIAGNOSIS — K59.00 CONSTIPATION, UNSPECIFIED CONSTIPATION TYPE: ICD-10-CM

## 2020-01-27 DIAGNOSIS — R10.9 ABDOMINAL PAIN, UNSPECIFIED ABDOMINAL LOCATION: Primary | ICD-10-CM

## 2020-01-27 DIAGNOSIS — R11.0 NAUSEA: ICD-10-CM

## 2020-01-27 RX ORDER — FAMOTIDINE 20 MG/1
20 TABLET, FILM COATED ORAL 2 TIMES DAILY
Qty: 30 TAB | Refills: 0 | Status: SHIPPED | OUTPATIENT
Start: 2020-01-27 | End: 2020-05-05

## 2020-01-27 RX ORDER — ONDANSETRON 4 MG/1
4 TABLET, ORALLY DISINTEGRATING ORAL
Qty: 15 TAB | Refills: 0 | Status: SHIPPED | OUTPATIENT
Start: 2020-01-27 | End: 2020-05-05

## 2020-01-27 NOTE — PROGRESS NOTES
[facility] [facility] Note      Subjective:     Chief Complaint   Patient presents with    Abdominal Pain     x 3 days     Nausea     x 3 days     Diarrhea     x 3 days      Debora Ford is a 34y.o. year old female who presents for evaluation of the following:      Abdominal Pain:   Associated naasea, loose stools x3 over past 2 days last yesterday  Onset: 2 days ago  Character: \"like a knife\"  Location: left side  - endorses chronic bulge in this area  Duration: Intermittent, lasting 1 hour  Frequency: Daily  Current Pain Ratin/10  Alleviating Factor: Rest  Aggravating Factor: eating  Treatment: Tums  Endorses: sensation to past BM but only passing gas, change in diet Mediterranean until this weekend  Denies fever, vomiting, chest pain, blood in stools, change in urinary habits        Review of Systems   Pertinent positives and negative per HPI. All other systems  reviewed are negative for a Comprehensive ROS (10+). Past Medical History:   Diagnosis Date    Abnormal Papanicolaou smear of cervix     - normal follow up    Headache     Herpes     Type 1--genital    Panic disorder     Psychiatric disorder     depression anxiety        Social History     Socioeconomic History    Marital status:      Spouse name: Not on file    Number of children: Not on file    Years of education: Not on file    Highest education level: Not on file   Occupational History    Not on file   Social Needs    Financial resource strain: Not on file    Food insecurity:     Worry: Not on file     Inability: Not on file    Transportation needs:     Medical: Not on file     Non-medical: Not on file   Tobacco Use    Smoking status: Former Smoker     Years: 5.00     Last attempt to quit: 2016     Years since quitting: 3.6    Smokeless tobacco: Never Used   Substance and Sexual Activity    Alcohol use:  Yes    Drug use: Not Currently     Comment: years ago    Sexual activity: Yes     Partners: Male     Birth control/protection: None   Lifestyle    Physical activity:     Days per week: Not on file     Minutes per session: Not on file    Stress: Not on file   Relationships    Social connections:     Talks on phone: Not on file     Gets together: Not on file     Attends Alevism service: Not on file     Active member of club or organization: Not on file     Attends meetings of clubs or organizations: Not on file     Relationship status: Not on file    Intimate partner violence:     Fear of current or ex partner: Not on file     Emotionally abused: Not on file     Physically abused: Not on file     Forced sexual activity: Not on file   Other Topics Concern     Service Not Asked    Blood Transfusions Not Asked    Caffeine Concern Not Asked    Occupational Exposure Not Asked   Dasie Hanks Hazards Not Asked    Sleep Concern Not Asked    Stress Concern Not Asked    Weight Concern Not Asked    Special Diet Not Asked    Back Care Not Asked    Exercise Not Asked    Bike Helmet Not Asked   [address] Not Asked    Self-Exams Not Asked   Social History Narrative    Parents are . Found out late in life mother used donor sperm for pregnancy. Family History   Problem Relation Age of Onset    Depression Mother     Depression Brother        No current outpatient medications on file. No current facility-administered medications for this visit. Objective:     Vitals:    01/27/20 0813   BP: 104/64   Pulse: 90   Resp: 17   Temp: 98.3 °F (36.8 °C)   TempSrc: Oral   SpO2: 97%   Weight: 181 lb 12.8 oz (82.5 kg)   Height: 5' 5\" (1.651 m)       Physical Examination:  General: Alert, cooperative, no distress, appears stated age. Obese  Eyes: Conjunctivae clear. Ears: Normal external ear canals both ears. Nose: Nares normal.   Mouth/Throat: Lips, mucosa, and tongue normal.   Neck: Supple, symmetrical, trachea midline, no adenopathy.  No thyroid enlargement/tenderness/nodules  Respiratory: Breathing comfortably, in no acute respiratory distress. Clear to auscultation bilaterally. Normal inspiratory and expiratory ratio. Cardiovascular: Regular rate and rhythm, S1, S2 normal, no murmur, click, rub or gallop.   -Extremities no edema. Pulses 2+ and symmetric radial   Abdomen: Soft, not distended. Bowel sounds normal. No masses or organomegaly. - minimal left side tenderness, non specific. Inconsistent right pelvic area tenderness. MSK: Extremities normal appearing, atraumatic, no effusion. Gait steady and unassisted. Skin: Skin color, texture, turgor normal. No rashes or lesions on exposed skin. Lymph nodes: Cervical, supraclavicular nodes normal.  Neurologic: Cranial nerves II-XII intact. Strength 5/5 grossly. Sensation and reflexes normal throughout. Psychiatric: Affect appropriate. Mood euthymic.  Thoughts logical. Speech volume and speed normal      Office Visit on 01/14/2020   Component Date Value Ref Range Status    WBC 01/14/2020 6.9  3.4 - 10.8 x10E3/uL Final    RBC 01/14/2020 4.19  3.77 - 5.28 x10E6/uL Final    HGB 01/14/2020 13.3  11.1 - 15.9 g/dL Final    HCT 01/14/2020 38.4  34.0 - 46.6 % Final    MCV 01/14/2020 92  79 - 97 fL Final    MCH 01/14/2020 31.7  26.6 - 33.0 pg Final    MCHC 01/14/2020 34.6  31.5 - 35.7 g/dL Final    RDW 01/14/2020 12.8  11.7 - 15.4 % Final                  **Please note reference interval change**    PLATELET 33/10/6764 286  150 - 450 x10E3/uL Final   Office Visit on 01/08/2020   Component Date Value Ref Range Status    WBC 01/08/2020 5.8  3.4 - 10.8 x10E3/uL Final    RBC 01/08/2020 4.27  3.77 - 5.28 x10E6/uL Final    HGB 01/08/2020 13.1  11.1 - 15.9 g/dL Final    HCT 01/08/2020 38.6  34.0 - 46.6 % Final    MCV 01/08/2020 90  79 - 97 fL Final    MCH 01/08/2020 30.7  26.6 - 33.0 pg Final    MCHC 01/08/2020 33.9  31.5 - 35.7 g/dL Final    RDW 01/08/2020 12.5  11.7 - 15.4 % Final **Please note reference interval change**    PLATELET 91/86/8725 378  150 - 450 x10E3/uL Final    NEUTROPHILS 01/08/2020 57  Not Estab. % Final    Lymphocytes 01/08/2020 31  Not Estab. % Final    MONOCYTES 01/08/2020 8  Not Estab. % Final    EOSINOPHILS 01/08/2020 2  Not Estab. % Final    BASOPHILS 01/08/2020 1  Not Estab. % Final    ABS. NEUTROPHILS 01/08/2020 3.3  1.4 - 7.0 x10E3/uL Final    Abs Lymphocytes 01/08/2020 1.8  0.7 - 3.1 x10E3/uL Final    ABS. MONOCYTES 01/08/2020 0.5  0.1 - 0.9 x10E3/uL Final    ABS. EOSINOPHILS 01/08/2020 0.1  0.0 - 0.4 x10E3/uL Final    ABS. BASOPHILS 01/08/2020 0.0  0.0 - 0.2 x10E3/uL Final    IMMATURE GRANULOCYTES 01/08/2020 1  Not Estab. % Final    ABS. IMM. GRANS. 01/08/2020 0.0  0.0 - 0.1 x10E3/uL Final    Glucose 01/08/2020 90  65 - 99 mg/dL Final    BUN 01/08/2020 19  6 - 20 mg/dL Final    Creatinine 01/08/2020 0.74  0.57 - 1.00 mg/dL Final    GFR est non-AA 01/08/2020 110  >59 mL/min/1.73 Final    GFR est AA 01/08/2020 127  >59 mL/min/1.73 Final    BUN/Creatinine ratio 01/08/2020 26* 9 - 23 Final    Sodium 01/08/2020 143  134 - 144 mmol/L Final    Potassium 01/08/2020 5.1  3.5 - 5.2 mmol/L Final    Chloride 01/08/2020 103  96 - 106 mmol/L Final    CO2 01/08/2020 25  20 - 29 mmol/L Final    Calcium 01/08/2020 9.9  8.7 - 10.2 mg/dL Final    Protein, total 01/08/2020 7.2  6.0 - 8.5 g/dL Final    Albumin 01/08/2020 4.5  3.5 - 5.5 g/dL Final    Comment:     **Effective January 20, 2020 Albumin reference**        interval will be changing to:                Age                Male          Female            0 -  7 days        3.6 - 4.9      3.6 - 4.9            8 - 30 days        3.4 - 4.7      3.4 - 4.7            1 -  6 month       3.7 - 4.8      3.7 - 4.8     7 months -  2 years       3.9 - 5.0      3.9 - 5.0            3 -  5 years       4.0 - 5.0      4.0 - 5.0            6 - 12 years       4.1 - 5.0      4.0 - 5.0           13 - 30 years       4.1 - 5.2      3.9 - 5.0           31 - 50 years       4.0 - 5.0      3.8 - 4.8           51 - 60 years       3.8 - 4.9      3.8 - 4.9           61 - 70 years       3.8 - 4.8      3.8 - 4.8           71 - 80 years       3.7 - 4.7      3.7 - 4.7           81 - 89 years       3.6 - 4.6      3.6 - 4.6               >89 years       3.5 - 4.6      3.5 - 4.6      GLOBULIN, TOTAL 01/08/2020 2.7  1.5 - 4.5 g/dL Final    A-G Ratio 01/08/2020 1.7  1.2 - 2.2 Final    Bilirubin, total 01/08/2020 0.3  0.0 - 1.2 mg/dL Final    Alk.  phosphatase 01/08/2020 78  39 - 117 IU/L Final    AST (SGOT) 01/08/2020 14  0 - 40 IU/L Final    ALT (SGPT) 01/08/2020 12  0 - 32 IU/L Final    Cholesterol, total 01/08/2020 159  100 - 199 mg/dL Final    Triglyceride 01/08/2020 26  0 - 149 mg/dL Final    HDL Cholesterol 01/08/2020 60  >39 mg/dL Final    VLDL, calculated 01/08/2020 5  5 - 40 mg/dL Final    LDL, calculated 01/08/2020 94  0 - 99 mg/dL Final    TSH 01/08/2020 1.910  0.450 - 4.500 uIU/mL Final    CLASS DESCRIPTION 01/08/2020 Comment   Final    Comment:     Levels of Specific IgE       Class  Description of Class      ---------------------------  -----  --------------------                     < 0.10         0         Negative             0.10 -    0.31         0/I       Equivocal/Low             0.32 -    0.55         I         Low             0.56 -    1.40         II        Moderate             1.41 -    3.90         III       High             3.91 -   19.00         IV        Very High            19.01 -  100.00         V         Very High                    >100.00         VI        Very High      Egg White 01/08/2020 <0.10  Class 0 kU/L Final    Peanut, IgE 01/08/2020 <0.10  Class 0 kU/L Final    Soybean 01/08/2020 <0.10  Class 0 kU/L Final    Milk (Cow) 01/08/2020 <0.10  Class 0 kU/L Final    Clam 01/08/2020 <0.10  Class 0 kU/L Final    Shrimp 01/08/2020 <0.10  Class 0 kU/L Final    Walnuts, IgE 01/08/2020 <0.10  Class 0 kU/L Final    Codfish 01/08/2020 <0.10  Class 0 kU/L Final    Scallops 01/08/2020 <0.10  Class 0 kU/L Final    Wheat 01/08/2020 <0.10  Class 0 kU/L Final    Corn 01/08/2020 <0.10  Class 0 kU/L Final    Sesame Seed 01/08/2020 <0.10  Class 0 kU/L Final    INTERPRETATION 01/08/2020 Note   Final    Supplemental report is available. Office Visit on 11/19/2019   Component Date Value Ref Range Status    WBC 11/19/2019 6.7  3.4 - 10.8 x10E3/uL Final    RBC 11/19/2019 4.52  3.77 - 5.28 x10E6/uL Final    HGB 11/19/2019 13.9  11.1 - 15.9 g/dL Final    HCT 11/19/2019 41.0  34.0 - 46.6 % Final    MCV 11/19/2019 91  79 - 97 fL Final    MCH 11/19/2019 30.8  26.6 - 33.0 pg Final    MCHC 11/19/2019 33.9  31.5 - 35.7 g/dL Final    RDW 11/19/2019 12.4  12.3 - 15.4 % Final    PLATELET 37/86/6255 104  150 - 450 x10E3/uL Final    NEUTROPHILS 11/19/2019 64  Not Estab. % Final    Lymphocytes 11/19/2019 25  Not Estab. % Final    MONOCYTES 11/19/2019 9  Not Estab. % Final    EOSINOPHILS 11/19/2019 2  Not Estab. % Final    BASOPHILS 11/19/2019 0  Not Estab. % Final    ABS. NEUTROPHILS 11/19/2019 4.3  1.4 - 7.0 x10E3/uL Final    Abs Lymphocytes 11/19/2019 1.7  0.7 - 3.1 x10E3/uL Final    ABS. MONOCYTES 11/19/2019 0.6  0.1 - 0.9 x10E3/uL Final    ABS. EOSINOPHILS 11/19/2019 0.1  0.0 - 0.4 x10E3/uL Final    ABS. BASOPHILS 11/19/2019 0.0  0.0 - 0.2 x10E3/uL Final    IMMATURE GRANULOCYTES 11/19/2019 0  Not Estab. % Final    ABS. IMM. GRANS. 11/19/2019 0.0  0.0 - 0.1 x10E3/uL Final           Assessment/ Plan:   Diagnoses and all orders for this visit:    1. Abdominal pain, unspecified abdominal location  -     ondansetron (ZOFRAN ODT) 4 mg disintegrating tablet; Take 1 Tab by mouth every eight (8) hours as needed for Nausea or Nausea or Vomiting.  -     famotidine (PEPCID) 20 mg tablet; Take 1 Tab by mouth two (2) times a day.     2. Gastroesophageal reflux disease, esophagitis presence not specified  -     famotidine (PEPCID) 20 mg tablet; Take 1 Tab by mouth two (2) times a day. 3. Constipation, unspecified constipation type    4. Nausea  -     ondansetron (ZOFRAN ODT) 4 mg disintegrating tablet; Take 1 Tab by mouth every eight (8) hours as needed for Nausea or Nausea or Vomiting.  -     famotidine (PEPCID) 20 mg tablet; Take 1 Tab by mouth two (2) times a day. Abdominal pain with nausea. Nonspecific, mild. Noted just out of postpartum, no hypertension. Likely developing GERD in response to constipation versus gastroenteritis versus bowel reaction to recent changes to diet. Treat nausea/GERD with Zofran and Pepcid. Once tolerating p.o. eat regular meals and is still no bowel movement consider OTC constipation meds. If not improved in 3 days, will consider abdominal x-ray, repeat CBC/BMP, stool studies. Educated patient on red flag symptoms to warrant return to clinic or emergency room visit. I have discussed the diagnosis with the patient and the intended plan as seen in the above orders. The patient has been offered or received an after-visit summary and questions were answered concerning future plans. I have discussed medication side effects and warnings with the patient as well. Follow-up and Dispositions    · Return if symptoms worsen or fail to improve.          Signed,    Meghan Wesley MD  1/27/2020

## 2020-01-27 NOTE — PROGRESS NOTES
Chief Complaint   Patient presents with    Abdominal Pain     x 3 days     Nausea     x 3 days     Diarrhea     x 3 days      1. Have you been to the ER, urgent care clinic since your last visit? Hospitalized since your last visit? No    2. Have you seen or consulted any other health care providers outside of the [address] [place] [address] since your last visit? Include any pap smears or colon screening.  No

## 2020-01-27 NOTE — PATIENT INSTRUCTIONS
Nausea and Vomiting: Care Instructions Your Care Instructions When you are nauseated, you may feel weak and sweaty and notice a lot of saliva in your mouth. Nausea often leads to vomiting. Most of the time you do not need to worry about nausea and vomiting, but they can be signs of other illnesses. Two common causes of nausea and vomiting are stomach flu and food poisoning. Nausea and vomiting from viral stomach flu will usually start to improve within 24 hours. Nausea and vomiting from food poisoning may last from 12 to 48 hours. The doctor has checked you carefully, but problems can develop later. If you notice any problems or new symptoms, get medical treatment right away. Follow-up care is a key part of your treatment and safety. Be sure to make and go to all appointments, and call your doctor if you are having problems. It's also a good idea to know your test results and keep a list of the medicines you take. How can you care for yourself at home? · To prevent dehydration, drink plenty of fluids, enough so that your urine is light yellow or clear like water. Choose water and other caffeine-free clear liquids until you feel better. If you have kidney, heart, or liver disease and have to limit fluids, talk with your doctor before you increase the amount of fluids you drink. · Rest in bed until you feel better. · When you are able to eat, try clear soups, mild foods, and liquids until all symptoms are gone for 12 to 48 hours. Other good choices include dry toast, crackers, cooked cereal, and gelatin dessert, such as Jell-O. When should you call for help? Call 911 anytime you think you may need emergency care. For example, call if: 
  · You passed out (lost consciousness).  
 Call your doctor now or seek immediate medical care if: 
  · You have symptoms of dehydration, such as: 
? Dry eyes and a dry mouth. ? Passing only a little dark urine. ?  Feeling thirstier than usual.  
   · You have new or worsening belly pain.  
  · You have a new or higher fever.  
  · You vomit blood or what looks like coffee grounds.  
 Watch closely for changes in your health, and be sure to contact your doctor if: 
  · You have ongoing nausea and vomiting.  
  · Your vomiting is getting worse.  
  · Your vomiting lasts longer than 2 days.  
  · You are not getting better as expected. Where can you learn more? Go to http://haven-aliza.info/. Enter 25 [id number] in the search box to learn more about \"Nausea and Vomiting: Care Instructions. \" Current as of: June 26, 2019 Content Version: 12.2 © 7565-5456 [employer]. Care instructions adapted under license by [employer] (which disclaims liability or warranty for this information). If you have questions about a medical condition or this instruction, always ask your healthcare professional. [address] any warranty or liability for your use of this information. Gastroesophageal Reflux Disease (GERD): Care Instructions Your Care Instructions Gastroesophageal reflux disease (GERD) is the backward flow of stomach acid into the esophagus. The esophagus is the tube that leads from your throat to your stomach. A one-way valve prevents the stomach acid from moving up into this tube. When you have GERD, this valve does not close tightly enough. If you have mild GERD symptoms including heartburn, you may be able to control the problem with antacids or over-the-counter medicine. Changing your diet, losing weight, and making other lifestyle changes can also help reduce symptoms. Follow-up care is a key part of your treatment and safety. Be sure to make and go to all appointments, and call your doctor if you are having problems. It's also a good idea to know your test results and keep a list of the medicines you take. How can you care for yourself at home? · Take your medicines exactly as prescribed. Call your doctor if you think you are having a problem with your medicine. · Your doctor may recommend over-the-counter medicine. For mild or occasional indigestion, antacids, such as Tums, Gaviscon, Mylanta, or Maalox, may help. Your doctor also may recommend over-the-counter acid reducers, such as Pepcid AC, Tagamet HB, Zantac 75, or Prilosec. Read and follow all instructions on the label. If you use these medicines often, talk with your doctor. · Change your eating habits. ? It's best to eat several small meals instead of two or three large meals. ? After you eat, wait 2 to 3 hours before you lie down. ? Chocolate, mint, and alcohol can make GERD worse. ? Spicy foods, foods that have a lot of acid (like tomatoes and oranges), and coffee can make GERD symptoms worse in some people. If your symptoms are worse after you eat a certain food, you may want to stop eating that food to see if your symptoms get better. · Do not smoke or chew tobacco. Smoking can make GERD worse. If you need help quitting, talk to your doctor about stop-smoking programs and medicines. These can increase your chances of quitting for good. · If you have GERD symptoms at night, raise the head of your bed 6 to 8 inches by putting the frame on blocks or placing a foam wedge under the head of your mattress. (Adding extra pillows does not work.) · Do not wear tight clothing around your middle. · Lose weight if you need to. Losing just 5 to 10 pounds can help. When should you call for help? Call your doctor now or seek immediate medical care if: 
  · You have new or different belly pain.  
  · Your stools are black and tarlike or have streaks of blood.  
 Watch closely for changes in your health, and be sure to contact your doctor if: 
  · Your symptoms have not improved after 2 days.  
  · Food seems to catch in your throat or chest.  
Where can you learn more? Go to http://haven-aliza.info/. Enter N650 in the search box to learn more about \"Gastroesophageal Reflux Disease (GERD): Care Instructions. \" Current as of: November 7, 2018 Content Version: 12.2 © 0512-8511 [employer]. Care instructions adapted under license by [employer] (which disclaims liability or warranty for this information). If you have questions about a medical condition or this instruction, always ask your healthcare professional. [address] any warranty or liability for your use of this information. Constipation: Care Instructions Your Care Instructions Constipation means that you have a hard time passing stools (bowel movements). People pass stools from 3 times a day to once every 3 days. What is normal for you may be different. Constipation may occur with pain in the rectum and cramping. The pain may get worse when you try to pass stools. Sometimes there are small amounts of bright red blood on toilet paper or the surface of stools. This is because of enlarged veins near the rectum (hemorrhoids). A few changes in your diet and lifestyle may help you avoid ongoing constipation. Your doctor may also prescribe medicine to help loosen your stool. Some medicines can cause constipation. These include pain medicines and antidepressants. Tell your doctor about all the medicines you take. Your doctor may want to make a medicine change to ease your symptoms. Follow-up care is a key part of your treatment and safety. Be sure to make and go to all appointments, and call your doctor if you are having problems. It's also a good idea to know your test results and keep a list of the medicines you take. How can you care for yourself at home? · Drink plenty of fluids, enough so that your urine is light yellow or clear like water.  If you have kidney, heart, or liver disease and have to limit fluids, talk with your doctor before you increase the amount of fluids you drink. · Include high-fiber foods in your diet each day. These include fruits, vegetables, beans, and whole grains. · Get at least 30 minutes of exercise on most days of the week. Walking is a good choice. You also may want to do other activities, such as running, swimming, cycling, or playing tennis or team sports. · Take a fiber supplement, such as Citrucel or Metamucil, every day. Read and follow all instructions on the label. · Schedule time each day for a bowel movement. A daily routine may help. Take your time having your bowel movement. · Support your feet with a small step stool when you sit on the toilet. This helps flex your hips and places your pelvis in a squatting position. · Your doctor may recommend an over-the-counter laxative to relieve your constipation. Examples are Milk of Magnesia and MiraLax. Read and follow all instructions on the label. Do not use laxatives on a long-term basis. When should you call for help? Call your doctor now or seek immediate medical care if: 
  · You have new or worse belly pain.  
  · You have new or worse nausea or vomiting.  
  · You have blood in your stools.  
 Watch closely for changes in your health, and be sure to contact your doctor if: 
  · Your constipation is getting worse.  
  · You do not get better as expected. Where can you learn more? Go to http://haven-aliza.info/. Enter 21 530.146.2145 in the search box to learn more about \"Constipation: Care Instructions. \" Current as of: June 26, 2019 Content Version: 12.2 © 2755-1240 [employer]. Care instructions adapted under license by [employer] (which disclaims liability or warranty for this information).  If you have questions about a medical condition or this instruction, always ask your healthcare professional. [employer] disclaims any warranty or liability for your use of this information.

## 2020-01-30 ENCOUNTER — APPOINTMENT (OUTPATIENT)
Dept: CT IMAGING | Age: 30
End: 2020-01-30
Attending: EMERGENCY MEDICINE
Payer: COMMERCIAL

## 2020-01-30 ENCOUNTER — NURSE TRIAGE (OUTPATIENT)
Dept: OTHER | Facility: CLINIC | Age: 30
End: 2020-01-30

## 2020-01-30 ENCOUNTER — HOSPITAL ENCOUNTER (EMERGENCY)
Age: 30
Discharge: HOME | End: 2020-01-30
Attending: EMERGENCY MEDICINE
Payer: COMMERCIAL

## 2020-01-30 VITALS
SYSTOLIC BLOOD PRESSURE: 99 MMHG | OXYGEN SATURATION: 96 % | HEIGHT: 65 IN | HEART RATE: 82 BPM | WEIGHT: 183 LBS | DIASTOLIC BLOOD PRESSURE: 62 MMHG | RESPIRATION RATE: 16 BRPM | TEMPERATURE: 98.2 F | BODY MASS INDEX: 30.49 KG/M2

## 2020-01-30 DIAGNOSIS — K59.00 CONSTIPATION, UNSPECIFIED CONSTIPATION TYPE: ICD-10-CM

## 2020-01-30 DIAGNOSIS — R10.12 ABDOMINAL PAIN, LUQ (LEFT UPPER QUADRANT): Primary | ICD-10-CM

## 2020-01-30 LAB
ALBUMIN SERPL-MCNC: 4.1 G/DL (ref 3.5–5)
ALBUMIN/GLOB SERPL: 1.1 {RATIO} (ref 1.1–2.2)
ALP SERPL-CCNC: 84 U/L (ref 45–117)
ALT SERPL-CCNC: 18 U/L (ref 12–78)
ANION GAP SERPL CALC-SCNC: 9 MMOL/L (ref 5–15)
APPEARANCE UR: CLEAR
AST SERPL-CCNC: 13 U/L (ref 15–37)
BASOPHILS # BLD: 0 K/UL (ref 0–0.1)
BASOPHILS NFR BLD: 0 % (ref 0–1)
BILIRUB SERPL-MCNC: 0.3 MG/DL (ref 0.2–1)
BILIRUB UR QL: NEGATIVE
BUN SERPL-MCNC: 17 MG/DL (ref 6–20)
BUN/CREAT SERPL: 21 (ref 12–20)
CALCIUM SERPL-MCNC: 9.3 MG/DL (ref 8.5–10.1)
CHLORIDE SERPL-SCNC: 104 MMOL/L (ref 97–108)
CO2 SERPL-SCNC: 28 MMOL/L (ref 21–32)
COLOR UR: ABNORMAL
COMMENT, HOLDF: NORMAL
CREAT SERPL-MCNC: 0.81 MG/DL (ref 0.55–1.02)
DIFFERENTIAL METHOD BLD: NORMAL
EOSINOPHIL # BLD: 0.1 K/UL (ref 0–0.4)
EOSINOPHIL NFR BLD: 2 % (ref 0–7)
ERYTHROCYTE [DISTWIDTH] IN BLOOD BY AUTOMATED COUNT: 12.2 % (ref 11.5–14.5)
GLOBULIN SER CALC-MCNC: 3.8 G/DL (ref 2–4)
GLUCOSE SERPL-MCNC: 87 MG/DL (ref 65–100)
GLUCOSE UR STRIP.AUTO-MCNC: NEGATIVE MG/DL
HCG SERPL QL: NEGATIVE
HCT VFR BLD AUTO: 38.7 % (ref 35–47)
HGB BLD-MCNC: 13.1 G/DL (ref 11.5–16)
HGB UR QL STRIP: NEGATIVE
IMM GRANULOCYTES # BLD AUTO: 0 K/UL (ref 0–0.04)
IMM GRANULOCYTES NFR BLD AUTO: 0 % (ref 0–0.5)
KETONES UR QL STRIP.AUTO: ABNORMAL MG/DL
LEUKOCYTE ESTERASE UR QL STRIP.AUTO: NEGATIVE
LYMPHOCYTES # BLD: 2.8 K/UL (ref 0.8–3.5)
LYMPHOCYTES NFR BLD: 40 % (ref 12–49)
MCH RBC QN AUTO: 30.6 PG (ref 26–34)
MCHC RBC AUTO-ENTMCNC: 33.9 G/DL (ref 30–36.5)
MCV RBC AUTO: 90.4 FL (ref 80–99)
MONOCYTES # BLD: 0.7 K/UL (ref 0–1)
MONOCYTES NFR BLD: 9 % (ref 5–13)
NEUTS SEG # BLD: 3.4 K/UL (ref 1.8–8)
NEUTS SEG NFR BLD: 49 % (ref 32–75)
NITRITE UR QL STRIP.AUTO: NEGATIVE
NRBC # BLD: 0 K/UL (ref 0–0.01)
NRBC BLD-RTO: 0 PER 100 WBC
PH UR STRIP: 6 [PH] (ref 5–8)
PLATELET # BLD AUTO: 203 K/UL (ref 150–400)
PMV BLD AUTO: 9.7 FL (ref 8.9–12.9)
POTASSIUM SERPL-SCNC: 3.9 MMOL/L (ref 3.5–5.1)
PROT SERPL-MCNC: 7.9 G/DL (ref 6.4–8.2)
PROT UR STRIP-MCNC: NEGATIVE MG/DL
RBC # BLD AUTO: 4.28 M/UL (ref 3.8–5.2)
SAMPLES BEING HELD,HOLD: NORMAL
SODIUM SERPL-SCNC: 141 MMOL/L (ref 136–145)
SP GR UR REFRACTOMETRY: 1.02 (ref 1–1.03)
UROBILINOGEN UR QL STRIP.AUTO: 0.2 EU/DL (ref 0.2–1)
WBC # BLD AUTO: 6.9 K/UL (ref 3.6–11)

## 2020-01-30 PROCEDURE — 74011636320 HC RX REV CODE- 636/320: Performed by: EMERGENCY MEDICINE

## 2020-01-30 PROCEDURE — 36415 COLL VENOUS BLD VENIPUNCTURE: CPT

## 2020-01-30 PROCEDURE — 74177 CT ABD & PELVIS W/CONTRAST: CPT

## 2020-01-30 PROCEDURE — 74011250637 HC RX REV CODE- 250/637: Performed by: EMERGENCY MEDICINE

## 2020-01-30 PROCEDURE — 85025 COMPLETE CBC W/AUTO DIFF WBC: CPT

## 2020-01-30 PROCEDURE — 81003 URINALYSIS AUTO W/O SCOPE: CPT

## 2020-01-30 PROCEDURE — 74011000258 HC RX REV CODE- 258: Performed by: EMERGENCY MEDICINE

## 2020-01-30 PROCEDURE — 74011250636 HC RX REV CODE- 250/636: Performed by: EMERGENCY MEDICINE

## 2020-01-30 PROCEDURE — 99285 EMERGENCY DEPT VISIT HI MDM: CPT

## 2020-01-30 PROCEDURE — 84703 CHORIONIC GONADOTROPIN ASSAY: CPT

## 2020-01-30 PROCEDURE — 80053 COMPREHEN METABOLIC PANEL: CPT

## 2020-01-30 RX ORDER — SIMETHICONE 80 MG
80 TABLET,CHEWABLE ORAL
Status: COMPLETED | OUTPATIENT
Start: 2020-01-30 | End: 2020-01-30

## 2020-01-30 RX ORDER — SODIUM CHLORIDE 9 MG/ML
10 INJECTION INTRAMUSCULAR; INTRAVENOUS; SUBCUTANEOUS ONCE
Status: COMPLETED | OUTPATIENT
Start: 2020-01-30 | End: 2020-01-30

## 2020-01-30 RX ORDER — POLYETHYLENE GLYCOL 3350 17 G/17G
17 POWDER, FOR SOLUTION ORAL
Status: COMPLETED | OUTPATIENT
Start: 2020-01-31 | End: 2020-01-30

## 2020-01-30 RX ORDER — SODIUM CHLORIDE 9 MG/ML
50 INJECTION, SOLUTION INTRAVENOUS
Status: COMPLETED | OUTPATIENT
Start: 2020-01-30 | End: 2020-01-30

## 2020-01-30 RX ADMIN — SODIUM CHLORIDE 50 ML: 900 INJECTION, SOLUTION INTRAVENOUS at 22:58

## 2020-01-30 RX ADMIN — POLYETHYLENE GLYCOL 3350 17 G: 17 POWDER, FOR SOLUTION ORAL at 23:51

## 2020-01-30 RX ADMIN — SODIUM CHLORIDE 10 ML: 9 INJECTION INTRAMUSCULAR; INTRAVENOUS; SUBCUTANEOUS at 22:59

## 2020-01-30 RX ADMIN — IOPAMIDOL 100 ML: 755 INJECTION, SOLUTION INTRAVENOUS at 22:58

## 2020-01-30 RX ADMIN — SODIUM CHLORIDE 1000 ML: 900 INJECTION, SOLUTION INTRAVENOUS at 22:04

## 2020-01-30 RX ADMIN — SIMETHICONE CHEW TAB 80 MG 80 MG: 80 TABLET ORAL at 22:14

## 2020-01-30 NOTE — LETTER
[facility] 
[address] 
[place] [address] 
[phone] Work/School Note Date: 1/30/2020 To Whom It May concern: 
 
Rodri Pichardo was seen and treated today in the emergency room by the following provider(s): 
Attending Provider: Lucas Leon MD. Rodri Pichardo may return to work on 2/3/20. Sincerely, Cathy Baugh MD

## 2020-01-31 ENCOUNTER — PATIENT OUTREACH (OUTPATIENT)
Dept: OTHER | Age: 30
End: 2020-01-31

## 2020-01-31 NOTE — ED PROVIDER NOTES
68-year-old female with no significant past medical history presents with complaints of 1 week intermittent sharp left upper quadrant abdominal pain. Patient reports it was severe tonight but has since subsided since arriving in the emergency department. Patient reports she has been taking Pepcid daily as prescribed for left upper abdominal pain. Patient reports it was associated with constipation earlier this week but she subsequently had 3 episodes of diarrhea today. Denies fever, chills. Reports a recent change in her diet following family visiting from out of town. Denies trauma. Denies urinary complaints. Denies vaginal complaints. Denies pregnancy.     Former smoker  Denies drug and alcohol use  Primary care care[facility]    No history of abdominal surgeries               Past Medical History:   Diagnosis Date    Abnormal Papanicolaou smear of cervix     2013- normal follow up    Headache     Herpes     Type 1--genital    Panic disorder     Psychiatric disorder     depression anxiety       Past Surgical History:   Procedure Laterality Date    HX ORTHOPAEDIC  05/2009    rt acl reconstruc    HX OTHER SURGICAL  01/2017    gallstones removal         Family History:   Problem Relation Age of Onset    Depression Mother     Depression Brother        Social History     Socioeconomic History    Marital status:      Spouse name: Not on file    Number of children: Not on file    Years of education: Not on file    Highest education level: Not on file   Occupational History    Not on file   Social Needs    Financial resource strain: Not on file    Food insecurity:     Worry: Not on file     Inability: Not on file    Transportation needs:     Medical: Not on file     Non-medical: Not on file   Tobacco Use    Smoking status: Former Smoker     Years: 5.00     Last attempt to quit: 06/2016     Years since quitting: 3.6    Smokeless tobacco: Never Used   Substance and Sexual Activity    Alcohol use: Yes    Drug use: Not Currently     Comment: years ago    Sexual activity: Yes     Partners: Male     Birth control/protection: None   Lifestyle    Physical activity:     Days per week: Not on file     Minutes per session: Not on file    Stress: Not on file   Relationships    Social connections:     Talks on phone: Not on file     Gets together: Not on file     Attends [employer] service: Not on file     Active member of club or organization: Not on file     Attends meetings of clubs or organizations: Not on file     Relationship status: Not on file    Intimate partner violence:     Fear of current or ex partner: Not on file     Emotionally abused: Not on file     Physically abused: Not on file     Forced sexual activity: Not on file   Other Topics Concern     Service Not Asked    Blood Transfusions Not Asked    Caffeine Concern Not Asked    Occupational Exposure Not Asked   [employer] Hazards Not Asked    Sleep Concern Not Asked    Stress Concern Not Asked    Weight Concern Not Asked    Special Diet Not Asked    Back Care Not Asked    Exercise Not Asked    Bike Helmet Not Asked   [address] Not Asked    Self-Exams Not Asked   Social History Narrative    Parents are . Found out late in life mother used donor sperm for pregnancy. ALLERGIES: Ciprofloxacin and Pcn [penicillins]    Review of Systems   Constitutional: Positive for chills. Negative for fever. HENT: Negative for congestion, nosebleeds and rhinorrhea. Eyes: Negative for pain and redness. Respiratory: Negative for cough and shortness of breath. Cardiovascular: Negative for chest pain and palpitations. Gastrointestinal: Positive for abdominal pain and nausea. Negative for vomiting. Genitourinary: Negative for dysuria, frequency, vaginal bleeding and vaginal pain. Musculoskeletal: Negative for myalgias. Skin: Negative for rash and wound.    Neurological: Negative for seizures, syncope and weakness. Hematological: Does not bruise/bleed easily. Psychiatric/Behavioral: Negative for agitation, confusion, dysphoric mood and suicidal ideas. The patient is not nervous/anxious. Vitals:    01/30/20 2143   BP: 129/82   Pulse: 77   Resp: 16   Temp: 98.2 °F (36.8 °C)   SpO2: 98%   Weight: 83 kg (183 lb)   Height: 5' 5\" (1.651 m)            Physical Exam  Vitals signs and nursing note reviewed. Constitutional:       Appearance: She is well-developed. HENT:      Head: Normocephalic and atraumatic. Eyes:      Pupils: Pupils are equal, round, and reactive to light. Neck:      Musculoskeletal: Normal range of motion and neck supple. Trachea: No tracheal deviation. Cardiovascular:      Rate and Rhythm: Normal rate and regular rhythm. Heart sounds: Normal heart sounds. Pulmonary:      Effort: Pulmonary effort is normal. No respiratory distress. Breath sounds: Normal breath sounds. No stridor. No wheezing or rales. Chest:      Chest wall: No tenderness. Abdominal:      General: Bowel sounds are normal. There is no distension. Palpations: Abdomen is soft. Tenderness: There is no abdominal tenderness. There is no rebound. Musculoskeletal: Normal range of motion. General: No tenderness. Skin:     General: Skin is warm and dry. Coloration: Skin is not pale. Findings: No rash. Neurological:      Mental Status: She is alert and oriented to person, place, and time. Cranial Nerves: No cranial nerve deficit. MDM  Number of Diagnoses or Management Options  Diagnosis management comments: 31-year-old female presents with intermittent left upper quadrant pain x1 week. Patient is afebrile, well-appearing, nontoxic, hemodynamically stable, abdomen soft/nontender/nondistended. Clinical impression consistent with constipation and gas pain. PlanIV fluid hydration, Gas-X, CBC/CMP/UA, CT abdomen pelvis.     Iabs unremarkable       Amount and/or Complexity of Data Reviewed  Clinical lab tests: ordered and reviewed  Tests in the radiology section of CPT®: ordered and reviewed    Patient Progress  Patient progress: improved         Procedures      11:42 PM  Pt reports pain resolved. Discussed results with patient. Agreeable with plan for discharge and follow-up with primary care physician. Patient reports she has Colace and MiraLAX and Gas-X at home so no prescription required. 11:42 PM  Patient's results have been reviewed with them. Patient and/or family have verbally conveyed their understanding and agreement of the patient's signs, symptoms, diagnosis, treatment and prognosis and additionally agree to follow up as recommended or return to the Emergency Room should their condition change prior to follow-up. Discharge instructions have also been provided to the patient with some educational information regarding their diagnosis as well a list of reasons why they would want to return to the ER prior to their follow-up appointment should their condition change.

## 2020-01-31 NOTE — DISCHARGE INSTRUCTIONS
Patient Education        Abdominal Pain: Care Instructions  Your Care Instructions    Abdominal pain has many possible causes. Some aren't serious and get better on their own in a few days. Others need more testing and treatment. If your pain continues or gets worse, you need to be rechecked and may need more tests to find out what is wrong. You may need surgery to correct the problem. Don't ignore new symptoms, such as fever, nausea and vomiting, urination problems, pain that gets worse, and dizziness. These may be signs of a more serious problem. Your doctor may have recommended a follow-up visit in the next 8 to 12 hours. If you are not getting better, you may need more tests or treatment. The doctor has checked you carefully, but problems can develop later. If you notice any problems or new symptoms, get medical treatment right away. Follow-up care is a key part of your treatment and safety. Be sure to make and go to all appointments, and call your doctor if you are having problems. It's also a good idea to know your test results and keep a list of the medicines you take. How can you care for yourself at home? · Rest until you feel better. · To prevent dehydration, drink plenty of fluids, enough so that your urine is light yellow or clear like water. Choose water and other caffeine-free clear liquids until you feel better. If you have kidney, heart, or liver disease and have to limit fluids, talk with your doctor before you increase the amount of fluids you drink. · If your stomach is upset, eat mild foods, such as rice, dry toast or crackers, bananas, and applesauce. Try eating several small meals instead of two or three large ones. · Wait until 48 hours after all symptoms have gone away before you have spicy foods, alcohol, and drinks that contain caffeine. · Do not eat foods that are high in fat. · Avoid anti-inflammatory medicines such as aspirin, ibuprofen (Advil, Motrin), and naproxen (Aleve). These can cause stomach upset. Talk to your doctor if you take daily aspirin for another health problem. When should you call for help? Call 911 anytime you think you may need emergency care. For example, call if:    · You passed out (lost consciousness).     · You pass maroon or very bloody stools.     · You vomit blood or what looks like coffee grounds.     · You have new, severe belly pain.    Call your doctor now or seek immediate medical care if:    · Your pain gets worse, especially if it becomes focused in one area of your belly.     · You have a new or higher fever.     · Your stools are black and look like tar, or they have streaks of blood.     · You have unexpected vaginal bleeding.     · You have symptoms of a urinary tract infection. These may include:  ? Pain when you urinate. ? Urinating more often than usual.  ? Blood in your urine.     · You are dizzy or lightheaded, or you feel like you may faint.    Watch closely for changes in your health, and be sure to contact your doctor if:    · You are not getting better after 1 day (24 hours). Where can you learn more? Go to http://haven-aliza.info/. Enter H624 in the search box to learn more about \"Abdominal Pain: Care Instructions. \"  Current as of: June 26, 2019  Content Version: 12.2  © 3138-5373 [employer]. Care instructions adapted under license by [employer] (which disclaims liability or warranty for this information). If you have questions about a medical condition or this instruction, always ask your healthcare professional. [place] [address] any warranty or liability for your use of this information. Patient Education        Constipation: Care Instructions  Your Care Instructions    Constipation means that you have a hard time passing stools (bowel movements). People pass stools from 3 times a day to once every 3 days. What is normal for you may be different.  Constipation may occur with pain in the rectum and cramping. The pain may get worse when you try to pass stools. Sometimes there are small amounts of bright red blood on toilet paper or the surface of stools. This is because of enlarged veins near the rectum (hemorrhoids). A few changes in your diet and lifestyle may help you avoid ongoing constipation. Your doctor may also prescribe medicine to help loosen your stool. Some medicines can cause constipation. These include pain medicines and antidepressants. Tell your doctor about all the medicines you take. Your doctor may want to make a medicine change to ease your symptoms. Follow-up care is a key part of your treatment and safety. Be sure to make and go to all appointments, and call your doctor if you are having problems. It's also a good idea to know your test results and keep a list of the medicines you take. How can you care for yourself at home? · Drink plenty of fluids, enough so that your urine is light yellow or clear like water. If you have kidney, heart, or liver disease and have to limit fluids, talk with your doctor before you increase the amount of fluids you drink. · Include high-fiber foods in your diet each day. These include fruits, vegetables, beans, and whole grains. · Get at least 30 minutes of exercise on most days of the week. Walking is a good choice. You also may want to do other activities, such as running, swimming, cycling, or playing tennis or team sports. · Take a fiber supplement, such as Citrucel or Metamucil, every day. Read and follow all instructions on the label. · Schedule time each day for a bowel movement. A daily routine may help. Take your time having your bowel movement. · Support your feet with a small step stool when you sit on the toilet. This helps flex your hips and places your pelvis in a squatting position. · Your doctor may recommend an over-the-counter laxative to relieve your constipation.  Examples are Milk of Magnesia and MiraLax. Read and follow all instructions on the label. Do not use laxatives on a long-term basis. When should you call for help? Call your doctor now or seek immediate medical care if:    · You have new or worse belly pain.     · You have new or worse nausea or vomiting.     · You have blood in your stools.    Watch closely for changes in your health, and be sure to contact your doctor if:    · Your constipation is getting worse.     · You do not get better as expected. Where can you learn more? Go to http://haven-aliza.info/. Enter 21  in the search box to learn more about \"Constipation: Care Instructions. \"  Current as of: June 26, 2019  Content Version: 12.2  © 1830-5232 [employer]. Care instructions adapted under license by [employer] (which disclaims liability or warranty for this information). If you have questions about a medical condition or this instruction, always ask your healthcare professional. [place] [address] any warranty or liability for your use of this information.

## 2020-01-31 NOTE — PROGRESS NOTES
CM outreach      Patient with 2 ED visits in Jan;  Ankle sprain 1/20; Abd pain  1/29   * Utilized Nurse call line before ED use. 9:45am    Initial attempt to contact patient to offer CM support for ongoing abd pain. Left discreet message on voicemail with this CM contact information. Will attempt to contact again. * Noted use of Colace and MiraLAX and Gas-X advised from ED. Pepsid and Zofran from PCP. No return call/  Will make second attempt next week. Chart Review:   ED visits; Ankle sprain 1/20; Abd pain  1/29   ED 1/30 - Abd pain   31-year-old female with no significant past medical history presents with complaints of 1 week intermittent sharp left upper quadrant abdominal pain. Patient reports it was severe tonight but has since subsided since arriving in the emergency department. Patient reports she has been taking Pepcid daily as prescribed for left upper abdominal pain. Patient reports it was associated with constipation earlier this week but she subsequently had 3 episodes of diarrhea today. Denies fever, chills. Reports a recent change in her diet following family visiting from out of town. Denies trauma. Denies urinary complaints. Denies vaginal complaints. Denies pregnancy. CT abd  IMPRESSION:   1. No CT explanation for the patient's abdominal pain. 2. Incidental findings as above. BP: 129/82   Pulse: 77   Resp: 16   Temp: 98.2 °F (36.8 °C)   SpO2: 98%   Weight: 83 kg (183 lb)   Height: 5' 5\" (1.651 m)          11:42 PM  Pt reports pain resolved. Discussed results with patient. Agreeable with plan for discharge and follow-up with primary care physician. Patient reports she has Colace and MiraLAX and Gas-X at home so no prescription required.     PCP  OV   1/27  BP: 104/64   Pulse: 90   Resp: 17   Temp: 98.3 °F (36.8 °C)   TempSrc: Oral   SpO2: 97%   Weight: 181 lb 12.8 oz (82.5 kg)   Height: 5' 5\" (1.651 m)      Assessment/ Plan:   Diagnoses and all orders for this visit:     1. Abdominal pain, unspecified abdominal location  -     ondansetron (ZOFRAN ODT) 4 mg disintegrating tablet; Take 1 Tab by mouth every eight (8) hours as needed for Nausea or Nausea or Vomiting.  -     famotidine (PEPCID) 20 mg tablet; Take 1 Tab by mouth two (2) times a day.     2. Gastroesophageal reflux disease, esophagitis presence not specified  -     famotidine (PEPCID) 20 mg tablet; Take 1 Tab by mouth two (2) times a day.     3. Constipation, unspecified constipation type     4. Nausea  -     ondansetron (ZOFRAN ODT) 4 mg disintegrating tablet; Take 1 Tab by mouth every eight (8) hours as needed for Nausea or Nausea or Vomiting.  -     famotidine (PEPCID) 20 mg tablet; Take 1 Tab by mouth two (2) times a day.       Abdominal pain with nausea. Nonspecific, mild. Noted just out of postpartum, no hypertension. Likely developing GERD in response to constipation versus gastroenteritis versus bowel reaction to recent changes to diet. Treat nausea/GERD with Zofran and Pepcid. Once tolerating p.o. eat regular meals and is still no bowel movement consider OTC constipation meds. If not improved in 3 days, will consider abdominal x-ray, repeat CBC/BMP, stool studies.

## 2020-01-31 NOTE — TELEPHONE ENCOUNTER
Reason for Disposition   [1] SEVERE pain (e.g., excruciating) AND [2] present > 1 hour    Answer Assessment - Initial Assessment Questions  1. LOCATION: \"Where does it hurt? \"       LUQ    2. RADIATION: \"Does the pain shoot anywhere else? \" (e.g., chest, back)  \" Radiates nika around the back\"    3. ONSET: \"When did the pain begin? \" (e.g., minutes, hours or days ago)       Since dinner today. 4. SUDDEN: \"Gradual or sudden onset? \"     \"Sudden, working its way up\"     5. PATTERN \"Does the pain come and go, or is it constant? \"     - If constant: \"Is it getting better, staying the same, or worsening? \"       (Note: Constant means the pain never goes away completely; most serious pain is constant and it progresses)      - If intermittent: \"How long does it last?\" \"Do you have pain now? \"      (Note: Intermittent means the pain goes away completely between bouts)     \"always there but intensity is intermittent. \"     6. SEVERITY: \"How bad is the pain? \"  (e.g., Scale 1-10; mild, moderate, or severe)    - MILD (1-3): doesn't interfere with normal activities, abdomen soft and not tender to touch     - MODERATE (4-7): interferes with normal activities or awakens from sleep, tender to touch     - SEVERE (8-10): excruciating pain, doubled over, unable to do any normal activities      7/10     7. RECURRENT SYMPTOM: \"Have you ever had this type of abdominal pain before? \" If so, ask: \"When was the last time? \" and \"What happened that time? \"       Yes, gallstones in the past and she thinks this is same. 8. CAUSE: \"What do you think is causing the abdominal pain? \"      Thinks an onset of gallstones. 9. RELIEVING/AGGRAVATING FACTORS: \"What makes it better or worse? \" (e.g., movement, antacids, bowel movement)      Nothing helping. Heat pack helped some. 10. OTHER SYMPTOMS: \"Has there been any vomiting, diarrhea, constipation, or urine problems? \"        Diarrhea., nausea, chills    11.  PREGNANCY: \"Is there any chance you are pregnant? \" \"When was your last menstrual period? \"       Denies,  LMP October 2018 - currently breast feeding. Protocols used: ABDOMINAL PAIN - [facility]    Caller states she is having severe pain to her LUQ - although states, she thinks, it is her gallbladder. Symptoms are similar to a gallstone attack she experienced in her past - same side. She had gallstones removed. She recently went to her PCP who prescribed Pepsid and Zofran. Caller states medication is not helping. Only relief is heating pad, when she removes heating pad, pain intensifies. Pain, chills, nausea, diarrhea. Doesn't know if she has a fever. Currently breast feeding. Recommendation is to proceed to the ED for further evaluation.

## 2020-02-03 ENCOUNTER — PATIENT OUTREACH (OUTPATIENT)
Dept: OTHER | Age: 30
End: 2020-02-03

## 2020-02-03 NOTE — PROGRESS NOTES
CCM outreach      Patient with 2 ED visits in Jan;  Ankle sprain 1/20; Abd pain  1/29   * Utilized Nurse call line before ED use. Patient identified as eligible for [address] [place] [address] services. Second telephone outreach attempted. Left discreet voicemail with this CM confidential contact information. Will send UTR letter. Follow one month for possible engagement. No episode opened.

## 2020-02-03 NOTE — LETTER
2/3/2020 10:43 AM 
 
Ms. Holland Brantley [address] [facility] [id number] Dear Ms. Richard Juan Alberto, My name is Ortega Vasques, Employee Care Manager for [facility] and I have been trying to reach you. The Employee Care Management [facility]) program is a free-of-charge confidential service provided to our employees and their family members covered by the [facility]. The program will provide an employee and his/her family with the [facility] expertise to assist in navigating the health care delivery system, provider services, and their overall care needsso as to assure and improve health care interactions and enhance the quality of life. This program is designed to provide you with the opportunity to have a [facility] care manager partner with you for the following services: 
 
 1) when you come home from the hospital or emergency room 2) when help is needed to manage your disease 3) when you need assistance coordinating services or appointments [facility] is dedicated to empowering the good health of its community and improving the quality of care and care experiences for employees and their families. We are committed to safeguarding patient confidentiality and privacy, assuring that every employee has the respect he or she deserves in managing their health. The information shared with your care manager will not be shared with anyone else aside from those you identify as part of your care team, and will only be used to assist you with any identified care needs. Please contact me if you would like this service provided to you. Sincerely, 
 
 
 
 
Ortega Vasques RN, [address], [facility]  [facility] [address]       Phone:  [phone]     Fax:  [phone]    [email or web address]

## 2020-02-28 ENCOUNTER — PATIENT OUTREACH (OUTPATIENT)
Dept: OTHER | Age: 30
End: 2020-02-28

## 2020-02-28 NOTE — PROGRESS NOTES
CCM Outreach      Patient with 2 ED visits in Jan;  Ankle sprain 1/20;   Abd pain  1/29   * Utilized Nurse call line before ED use.      4:00pm  Incoming email from Ms. Ursula Hoyos. * She reports that she received the letter and my contact information. * Thanks me for contacting her. * Reports that she is more easily contacted at work. - No comment as to her wish to be included in CM program.      4:25pm  Outgoing email:  Included permission to use email and added (safemail) to the message.     - Inquired if she would like support while managing her medical issue/ without specific references to abdominal pain. 5:10pm  No response from pt/ FU Monday.

## 2020-03-02 ENCOUNTER — PATIENT OUTREACH (OUTPATIENT)
Dept: OTHER | Age: 30
End: 2020-03-02

## 2020-03-02 NOTE — PROGRESS NOTES
CM outreach       Patient with 2 ED visits in ;  Ankle sprain ;   Abd pain     * Utilized Nurse call line before ED use.       2:45pm  Verified  and address for HIPAA security. Introduced [employer] for patient. Patient does not identify any Care Management needs at this time and declines services.

## 2020-03-04 ENCOUNTER — OFFICE VISIT (OUTPATIENT)
Dept: FAMILY MEDICINE CLINIC | Age: 30
End: 2020-03-04

## 2020-03-04 VITALS
WEIGHT: 178 LBS | OXYGEN SATURATION: 98 % | SYSTOLIC BLOOD PRESSURE: 122 MMHG | BODY MASS INDEX: 29.66 KG/M2 | TEMPERATURE: 98.9 F | DIASTOLIC BLOOD PRESSURE: 74 MMHG | RESPIRATION RATE: 16 BRPM | HEART RATE: 91 BPM | HEIGHT: 65 IN

## 2020-03-04 DIAGNOSIS — F41.8 DEPRESSION WITH ANXIETY: ICD-10-CM

## 2020-03-04 DIAGNOSIS — S29.012A STRAIN OF THORACIC BACK REGION: Primary | ICD-10-CM

## 2020-03-04 DIAGNOSIS — M79.18 MUSCULOSKELETAL PAIN: ICD-10-CM

## 2020-03-04 DIAGNOSIS — F41.0 PANIC DISORDER: ICD-10-CM

## 2020-03-04 DIAGNOSIS — R07.89 CHEST PAIN, NON-CARDIAC: ICD-10-CM

## 2020-03-04 NOTE — PATIENT INSTRUCTIONS
Chest Pain: Care Instructions Your Care Instructions There are many things that can cause chest pain. Some are not serious and will get better on their own in a few days. But some kinds of chest pain need more testing and treatment. Your doctor may have recommended a follow-up visit in the next 8 to 12 hours. If you are not getting better, you may need more tests or treatment. Even though your doctor has released you, you still need to watch for any problems. The doctor carefully checked you, but sometimes problems can develop later. If you have new symptoms or if your symptoms do not get better, get medical care right away. If you have worse or different chest pain or pressure that lasts more than 5 minutes or you passed out (lost consciousness), call 911 or seek other emergency help right away. A medical visit is only one step in your treatment. Even if you feel better, you still need to do what your doctor recommends, such as going to all suggested follow-up appointments and taking medicines exactly as directed. This will help you recover and help prevent future problems. How can you care for yourself at home? · Rest until you feel better. · Take your medicine exactly as prescribed. Call your doctor if you think you are having a problem with your medicine. · Do not drive after taking a prescription pain medicine. When should you call for help? Call 911 if: 
  · You passed out (lost consciousness).  
  · You have severe difficulty breathing.  
  · You have symptoms of a heart attack. These may include: 
? Chest pain or pressure, or a strange feeling in your chest. 
? Sweating. ? Shortness of breath. ? Nausea or vomiting. ? Pain, pressure, or a strange feeling in your back, neck, jaw, or upper belly or in one or both shoulders or arms. ? Lightheadedness or sudden weakness. ? A fast or irregular heartbeat.  
After you call 911, the  may tell you to chew 1 adult-strength or 2 to 4 low-dose aspirin. Wait for an ambulance. Do not try to drive yourself.  
 Call your doctor today if: 
  · You have any trouble breathing.  
  · Your chest pain gets worse.  
  · You are dizzy or lightheaded, or you feel like you may faint.  
  · You are not getting better as expected.  
  · You are having new or different chest pain. Where can you learn more? Go to http://haven-aliza.info/. Enter A120 in the search box to learn more about \"Chest Pain: Care Instructions. \" Current as of: June 26, 2019 Content Version: 12.2 © 3555-0940 [employer]. Care instructions adapted under license by [employer] (which disclaims liability or warranty for this information). If you have questions about a medical condition or this instruction, always ask your healthcare professional. [address] any warranty or liability for your use of this information. Musculoskeletal Chest Pain: Care Instructions Your Care Instructions Chest pain is not always a sign that something is wrong with your heart or that you have another serious problem. The doctor thinks your chest pain is caused by strained muscles or ligaments, inflamed chest cartilage, or another problem in your chest, rather than by your heart. You may need more tests to find the cause of your chest pain. Follow-up care is a key part of your treatment and safety. Be sure to make and go to all appointments, and call your doctor if you are having problems. It's also a good idea to know your test results and keep a list of the medicines you take. How can you care for yourself at home? · Take pain medicines exactly as directed. ? If the doctor gave you a prescription medicine for pain, take it as prescribed. ? If you are not taking a prescription pain medicine, ask your doctor if you can take an over-the-counter medicine. · Rest and protect the sore area. · Stop, change, or take a break from any activity that may be causing your pain or soreness. · Put ice or a cold pack on the sore area for 10 to 20 minutes at a time. Try to do this every 1 to 2 hours for the next 3 days (when you are awake) or until the swelling goes down. Put a thin cloth between the ice and your skin. · After 2 or 3 days, apply a heating pad set on low or a warm cloth to the area that hurts. Some doctors suggest that you go back and forth between hot and cold. · Do not wrap or tape your ribs for support. This may cause you to take smaller breaths, which could increase your risk of lung problems. · Mentholated creams such as Bengay or Icy Hot may soothe sore muscles. Follow the instructions on the package. · Follow your doctor's instructions for exercising. · Gentle stretching and massage may help you get better faster. Stretch slowly to the point just before pain begins, and hold the stretch for at least 15 to 30 seconds. Do this 3 or 4 times a day. Stretch just after you have applied heat. · As your pain gets better, slowly return to your normal activities. Any increased pain may be a sign that you need to rest a while longer. When should you call for help? Call 911 anytime you think you may need emergency care. For example, call if: 
  · You have chest pain or pressure. This may occur with: ? Sweating. ? Shortness of breath. ? Nausea or vomiting. ? Pain that spreads from the chest to the neck, jaw, or one or both shoulders or arms. ? Dizziness or lightheadedness. ? A fast or uneven pulse. After calling 911, chew 1 adult-strength aspirin. Wait for an ambulance.  Do not try to drive yourself.  
  · You have sudden chest pain and shortness of breath, or you cough up blood.  
 Call your doctor now or seek immediate medical care if: 
  · You have any trouble breathing.  
  · Your chest pain gets worse.  
  · Your chest pain occurs consistently with exercise and is relieved by rest.  
  Watch closely for changes in your health, and be sure to contact your doctor if: 
  · Your chest pain does not get better after 1 week. Where can you learn more? Go to http://haven-aliza.info/. Enter V293 in the search box to learn more about \"Musculoskeletal Chest Pain: Care Instructions. \" Current as of: June 26, 2019 Content Version: 12.2 © 1837-3324 [employer]. Care instructions adapted under license by [employer] (which disclaims liability or warranty for this information). If you have questions about a medical condition or this instruction, always ask your healthcare professional. [address] any warranty or liability for your use of this information.

## 2020-03-04 NOTE — PROGRESS NOTES
Chief Complaint   Patient presents with    Back Pain     after lifting her dtr today putting her in the car set.  Panic Attack     she started feeling panic b/c she started havng chest pain. she has stopped her zoloft     1. Have you been to the ER, urgent care clinic since your last visit? Hospitalized since your last visit? Yes Where: [facility] Jan 30 abd pain, Jan 20th b/c she fell    2. Have you seen or consulted any other health care providers outside of the [address] [place] [address] since your last visit? Include any pap smears or colon screening. Derm Dr Jarett Sevilla    Has a counselor she saw her a couple of weeks ago. Michelle Stapleton.

## 2020-03-05 ENCOUNTER — TELEPHONE (OUTPATIENT)
Dept: FAMILY MEDICINE CLINIC | Age: 30
End: 2020-03-05

## 2020-03-05 DIAGNOSIS — Z20.828 EXPOSURE TO INFLUENZA: Primary | ICD-10-CM

## 2020-03-05 RX ORDER — OSELTAMIVIR PHOSPHATE 75 MG/1
75 CAPSULE ORAL DAILY
Qty: 10 CAP | Refills: 0 | Status: SHIPPED | OUTPATIENT
Start: 2020-03-05 | End: 2020-03-11 | Stop reason: ALTCHOICE

## 2020-03-05 NOTE — TELEPHONE ENCOUNTER
----- Message from Latanya Pereyra sent at 3/5/2020  7:51 AM EST -----  Regarding: NP [facility]/Telephone        Patient advised her daughter was diagnosed with the Flu strain A. Stated she would like to have Tamiflu called into the [facility] pharmacy on [address] and [address].  Best contact number is [phone]

## 2020-03-05 NOTE — TELEPHONE ENCOUNTER
Outbound call to patient.  Left message on patients personal voicemail that medication has been sent to pharmacy as requested and if any questions can give the office a call back at [phone]

## 2020-03-11 ENCOUNTER — OFFICE VISIT (OUTPATIENT)
Dept: FAMILY MEDICINE CLINIC | Age: 30
End: 2020-03-11

## 2020-03-11 VITALS
HEIGHT: 65 IN | HEART RATE: 86 BPM | OXYGEN SATURATION: 98 % | RESPIRATION RATE: 18 BRPM | BODY MASS INDEX: 29.22 KG/M2 | SYSTOLIC BLOOD PRESSURE: 138 MMHG | DIASTOLIC BLOOD PRESSURE: 86 MMHG | WEIGHT: 175.4 LBS | TEMPERATURE: 98.7 F

## 2020-03-11 DIAGNOSIS — F41.0 PANIC DISORDER: ICD-10-CM

## 2020-03-11 DIAGNOSIS — N91.1 SECONDARY AMENORRHEA: Primary | ICD-10-CM

## 2020-03-11 NOTE — PROGRESS NOTES
Assessment/Plan:     Diagnoses and all orders for this visit:    1. Secondary amenorrhea  -     BETA HCG, QT  Negative urine pregnancy test.  She requests serum draw which was completed today. Discussed forms of birth control which are compatible with breastfeeding and she will consider. 2. Panic disorder  We have discussed Viibryd being safe in breastfeeding and will consider restarting when her daughter has recovered from her recent illness. She will plan to be out of work for two weeks. Follow-up and Dispositions    · Return in about 2 weeks (around 3/25/2020) for Follow Up. Discussed expected course/resolution/complications of diagnosis in detail with patient. Medication risks/benefits/costs/interactions/alternatives discussed with patient. Pt was given after visit summary which includes diagnoses, current medications & vitals. Pt expressed understanding with the diagnosis and plan          Subjective:      Carol Cameron is a 27 y.o. female who presents for had concerns including Follow-up. Reports a history of anxiety and panic disorder. Previously attempted Marnee Polo, and Zoloft in past.  Is hesitant towards resuming treatment as she is breastfeeding at this time. Reports symptoms have been recently uncontrolled with health issues her daughter is facing (recent influenza and RSV).  and mother have been assisting in care. Reports excessive worry. Reports a several day history of persistent nausea and is concerned she may be pregnant. Has not attempted home pregnancy test.  Not currently using any form of contraceptive or natural family planning. Has not restarted menses with current breastfeeding. Patient Active Problem List   Diagnosis Code    Mild episode of recurrent major depressive disorder ([facility] [address] F33.0    Obesity (BMI 30-39. 9) E66.9    24 weeks gestation of pregnancy Z3A.24    Pregnancy Z34.90       Current Outpatient Medications   Medication Sig Dispense Refill    prenatal vit calc,iron,folic (PRENATAL VITAMIN PO) Take  by mouth.  ondansetron (ZOFRAN ODT) 4 mg disintegrating tablet Take 1 Tab by mouth every eight (8) hours as needed for Nausea or Nausea or Vomiting. 15 Tab 0    famotidine (PEPCID) 20 mg tablet Take 1 Tab by mouth two (2) times a day. 30 Tab 0       Allergies   Allergen Reactions    Ciprofloxacin Rash    Lexapro [Escitalopram] Other (comments)     \"brain zaps\"    Pcn [Penicillins] Itching       ROS:   Review of Systems   Constitutional: Positive for malaise/fatigue. Eyes: Negative for blurred vision. Respiratory: Negative for shortness of breath. Cardiovascular: Negative for chest pain. Gastrointestinal: Positive for nausea. Negative for vomiting. Psychiatric/Behavioral: The patient is nervous/anxious. Objective:     Visit Vitals  /86 (BP 1 Location: Left arm, BP Patient Position: Sitting)   Pulse 86   Temp 98.7 °F (37.1 °C) (Oral)   Resp 18   Ht 5' 5\" (1.651 m)   Wt 175 lb 6.4 oz (79.6 kg)   SpO2 98%   BMI 29.19 kg/m²       Vitals and Nurse Documentation reviewed. Physical Exam  Constitutional:       General: She is not in acute distress. Cardiovascular:      Heart sounds: S1 normal and S2 normal. No murmur. No friction rub. No gallop. Pulmonary:      Effort: No respiratory distress. Breath sounds: Normal breath sounds. Skin:     General: Skin is warm and dry. Psychiatric:         Mood and Affect: Affect normal. Mood is anxious. Speech: Speech is delayed. Behavior: Behavior is withdrawn.

## 2020-03-11 NOTE — PROGRESS NOTES
Chief Complaint   Patient presents with    Follow-up     1. Have you been to the ER, urgent care clinic since your last visit? Hospitalized since your last visit? No    2. Have you seen or consulted any other health care providers outside of the [address] [place] [address] since your last visit? Include any pap smears or colon screening.  No

## 2020-03-18 ENCOUNTER — TELEPHONE (OUTPATIENT)
Dept: FAMILY MEDICINE CLINIC | Age: 30
End: 2020-03-18

## 2020-03-20 NOTE — TELEPHONE ENCOUNTER
Outbound call to patient.  Left message on patients personal voicemail that per provider to self quarantine x 14 days and paper work will reflect updated time frames and if any questions can give the office a call back at [phone]

## 2020-03-20 NOTE — TELEPHONE ENCOUNTER
She already told me she was having flu like illness just the other day, she needs to self quarantine for 14 days and her paperwork will reflect the update time frames.

## 2020-03-27 ENCOUNTER — VIRTUAL VISIT (OUTPATIENT)
Dept: FAMILY MEDICINE CLINIC | Age: 30
End: 2020-03-27

## 2020-03-27 DIAGNOSIS — H10.31 ACUTE CONJUNCTIVITIS OF RIGHT EYE, UNSPECIFIED ACUTE CONJUNCTIVITIS TYPE: Primary | ICD-10-CM

## 2020-03-27 RX ORDER — POLYMYXIN B SULFATE AND TRIMETHOPRIM 1; 10000 MG/ML; [USP'U]/ML
1 SOLUTION OPHTHALMIC EVERY 4 HOURS
Qty: 1 BOTTLE | Refills: 0 | Status: SHIPPED | OUTPATIENT
Start: 2020-03-27 | End: 2020-04-03

## 2020-03-27 NOTE — PROGRESS NOTES
[facility] Note  Subjective:      Tona Chand is a 27 y.o. female who presents for an acute visit with the following chief complaints. Chief Complaint   Patient presents with   [address]   This service was provided through telehealth via [employer], with the patient being at home and the provider being at [facility] in [place], [place]. Others assisting in the telehealth encounter included none. 10 minutes were spent with the patient by the provider. Conjunctivitis  Patient presents for presents evaluation of eye redness. She has noticed the above symptoms in the right eye for 1 days. Onset was sudden, gradually worsening since onset. Symptoms have included tearing, foreign body sensation, discharge, erythema, itching. Patient denies blurred vision, visual field deficit, photophobia. There is a history of contact lens use but has since switched to wearing glasses. Denies trauma, exposure to chemicals, allergies. She reports recent flu-like illness that started 2 weeks ago, resolving since onset. Residual mild nasal congestion and throat irritation. Daughter has been ill with recent viral illness as well including influenza. She is breastfeeding. No prior evaluations or treatments. Current Outpatient Medications   Medication Sig Dispense Refill    trimethoprim-polymyxin b (POLYTRIM) ophthalmic solution Administer 1 Drop to right eye every four (4) hours for 7 days. Indications: pink eye from bacterial infection 1 Bottle 0    prenatal vit calc,iron,folic (PRENATAL VITAMIN PO) Take  by mouth.  ondansetron (ZOFRAN ODT) 4 mg disintegrating tablet Take 1 Tab by mouth every eight (8) hours as needed for Nausea or Nausea or Vomiting. 15 Tab 0    famotidine (PEPCID) 20 mg tablet Take 1 Tab by mouth two (2) times a day.  30 Tab 0     Allergies   Allergen Reactions    Ciprofloxacin Rash    Lexapro [Escitalopram] Other (comments)     \"brain zaps\"    Pcn [Penicillins] Itching       ROS:   Complete review of systems was reviewed with pertinent information listed in HPI. Review of Systems   Constitutional: Negative for chills, fever, malaise/fatigue and weight loss. HENT: Positive for congestion and sore throat. Eyes: Positive for discharge and redness. Negative for blurred vision, double vision, photophobia and pain. Respiratory: Negative for cough, hemoptysis, sputum production, shortness of breath and wheezing. Cardiovascular: Negative for chest pain. Gastrointestinal: Negative for abdominal pain, diarrhea, nausea and vomiting. Musculoskeletal: Negative for myalgias. Skin: Negative for rash. Neurological: Negative for dizziness and headaches. Objective: There were no vitals taken for this visit. Vitals and Nurse Documentation reviewed. Physical Exam  Constitutional:       General: She is not in acute distress. Appearance: Normal appearance. She is well-developed, well-groomed and normal weight. She is not ill-appearing, toxic-appearing or diaphoretic. HENT:      Head: Normocephalic and atraumatic. Eyes:      General: Lids are normal.      Conjunctiva/sclera:      Right eye: Right conjunctiva is injected. Left eye: Left conjunctiva is not injected. Pulmonary:      Effort: Pulmonary effort is normal.   Neurological:      Mental Status: She is alert. Psychiatric:         Mood and Affect: Mood normal.         Behavior: Behavior normal. Behavior is cooperative. Thought Content: Thought content normal.         Judgment: Judgment normal.         Assessment/Plan:     Diagnoses and all orders for this visit:    1. Acute conjunctivitis of right eye, unspecified acute conjunctivitis type: Acute onset yesterday. Symptoms consistent with conjunctivitis, bacterial verse viral. Will treat empirically with antibiotic eyedrops. Eye hygiene reviewed in detail.  Discuss option of Flu Clinic evaluation for continued sore throat or congestion. Advised continued symptomatic therapy as symptoms today are mild and improving since onset. -     trimethoprim-polymyxin b (POLYTRIM) ophthalmic solution; Administer 1 Drop to right eye every four (4) hours for 7 days. Indications: pink eye from bacterial infection      Follow-up and Dispositions    · Return if symptoms worsen or fail to improve.          Discussed expected course/resolution/complications of diagnosis in detail with patient.    Medication risks/benefits/costs/interactions/alternatives discussed with patient.    Pt was given an after visit summary which includes diagnoses, current medications & vitals.  Pt expressed understanding with the diagnosis and plan

## 2020-03-28 ENCOUNTER — NURSE TRIAGE (OUTPATIENT)
Dept: OTHER | Facility: CLINIC | Age: 30
End: 2020-03-28

## 2020-03-28 ENCOUNTER — HOSPITAL ENCOUNTER (EMERGENCY)
Age: 30
Discharge: HOME | End: 2020-03-29
Attending: EMERGENCY MEDICINE
Payer: COMMERCIAL

## 2020-03-28 VITALS
TEMPERATURE: 97.9 F | OXYGEN SATURATION: 100 % | BODY MASS INDEX: 28.25 KG/M2 | DIASTOLIC BLOOD PRESSURE: 74 MMHG | HEIGHT: 65 IN | HEART RATE: 89 BPM | WEIGHT: 169.53 LBS | SYSTOLIC BLOOD PRESSURE: 119 MMHG | RESPIRATION RATE: 16 BRPM

## 2020-03-28 DIAGNOSIS — F41.0 PANIC ATTACK: Primary | ICD-10-CM

## 2020-03-28 DIAGNOSIS — R07.9 CHEST PAIN, UNSPECIFIED TYPE: ICD-10-CM

## 2020-03-28 LAB
COMMENT, HOLDF: NORMAL
SAMPLES BEING HELD,HOLD: NORMAL

## 2020-03-28 PROCEDURE — 99284 EMERGENCY DEPT VISIT MOD MDM: CPT

## 2020-03-28 PROCEDURE — 74011250637 HC RX REV CODE- 250/637: Performed by: EMERGENCY MEDICINE

## 2020-03-28 PROCEDURE — 36415 COLL VENOUS BLD VENIPUNCTURE: CPT

## 2020-03-28 PROCEDURE — 93005 ELECTROCARDIOGRAM TRACING: CPT

## 2020-03-28 RX ORDER — HYDROXYZINE PAMOATE 25 MG/1
25 CAPSULE ORAL
Qty: 20 CAP | Refills: 0 | Status: SHIPPED | OUTPATIENT
Start: 2020-03-28 | End: 2020-05-05

## 2020-03-28 RX ORDER — HYDROXYZINE 25 MG/1
25 TABLET, FILM COATED ORAL
Status: COMPLETED | OUTPATIENT
Start: 2020-03-29 | End: 2020-03-28

## 2020-03-28 RX ADMIN — HYDROXYZINE HYDROCHLORIDE 25 MG: 25 TABLET, FILM COATED ORAL at 23:58

## 2020-03-29 ENCOUNTER — NURSE TRIAGE (OUTPATIENT)
Dept: OTHER | Facility: CLINIC | Age: 30
End: 2020-03-29

## 2020-03-29 LAB
ATRIAL RATE: 121 BPM
CALCULATED P AXIS, ECG09: 66 DEGREES
CALCULATED R AXIS, ECG10: -31 DEGREES
CALCULATED T AXIS, ECG11: 4 DEGREES
DIAGNOSIS, 93000: NORMAL
P-R INTERVAL, ECG05: 158 MS
Q-T INTERVAL, ECG07: 334 MS
QRS DURATION, ECG06: 92 MS
QTC CALCULATION (BEZET), ECG08: 474 MS
VENTRICULAR RATE, ECG03: 121 BPM

## 2020-03-29 NOTE — TELEPHONE ENCOUNTER
Reason for Disposition  [facility] has already spoken with another triager and has no further questions. Protocols used: NO CONTACT OR DUPLICATE CONTACT CALL-ADULT-AH    Pt was seen in ER last night for same symptoms. Her sx have not changed. Pt was instructed to follow f/u instructions per ER provided and pt verbalizes understanding. Pt provided with BS 24/7 lupe info, if she would like to reach out to a provider now. Pt verbalizes that she would like to be tested for COVID, this writer states that that would need to be determined by a provider. Pt told she could go back to ER if she feels she should. Pt educated on social distancing/  Hand hygiene/ precautions. Pt told to contact [facility] re: going back to work - phone number provided.

## 2020-03-29 NOTE — ED TRIAGE NOTES
Hx of anxiety with c.p.. c/o of c.p tonight same as in the past. Claims she is anxious because her child has the flu. Pt also upset because she has pink eye. nad.

## 2020-03-29 NOTE — ED NOTES
I have reviewed discharge instructions with the patient. The patient verbalized understanding. Ambulatory to vehicle. Nad. Pt provided community resources for mental health to assist with her anxiety.

## 2020-03-29 NOTE — ED PROVIDER NOTES
60-year-old female with a history of anxiety, depression, panic attacks, GERD, presents to the emergency department stating that she has been under a lot of stress recently caring for a sick child at home who reportedly has the flu. Symptoms started this evening around 5 PM when she began developing a sensation of heart palpitations, increased anxiety, dull, achy, constant precordial chest pain, and sweaty palms. She has had mild pinkeye identical to the symptoms of her child, but she denies any shortness of breath, cough, URI symptoms, fever, chills, nausea, vomiting, abdominal pain, SI, HI, AVH. She states that she has an extensive history of similar episodes and follows with a counselor but does not take any medications for her mental health. Past Medical History:   Diagnosis Date    Abnormal Papanicolaou smear of cervix     2013- normal follow up    Headache     Herpes     Type 1--genital    Panic disorder     Psychiatric disorder     depression anxiety       Past Surgical History:   Procedure Laterality Date    HX ORTHOPAEDIC  05/2009    rt acl reconstruc    HX OTHER SURGICAL  01/2017    gallstones removal         Family History:   Problem Relation Age of Onset    Depression Mother     Depression Brother        Social History     Socioeconomic History    Marital status:      Spouse name: Not on file    Number of children: 1    Years of education: Not on file    Highest education level: Not on file   Occupational History    Occupation: Nurse   Social Needs    Financial resource strain: Not on file    Food insecurity     Worry: Not on file     Inability: Not on file   [employer] needs     Medical: Not on file     Non-medical: Not on file   Tobacco Use    Smoking status: Former Smoker     Years: 5.00     Last attempt to quit: 06/2016     Years since quitting: 3.8    Smokeless tobacco: Never Used   Substance and Sexual Activity    Alcohol use:  Yes    Drug use: Not Currently     Comment: years ago    Sexual activity: Yes     Partners: Male     Birth control/protection: None     Comment: breast feeding   Lifestyle    Physical activity     Days per week: Not on file     Minutes per session: Not on file    Stress: Not on file   Relationships    Social connections     Talks on phone: Not on file     Gets together: Not on file     Attends Taoism service: Not on file     Active member of club or organization: Not on file     Attends meetings of clubs or organizations: Not on file     Relationship status: Not on file    Intimate partner violence     Fear of current or ex partner: Not on file     Emotionally abused: Not on file     Physically abused: Not on file     Forced sexual activity: Not on file   Other Topics Concern     Service Not Asked    Blood Transfusions Not Asked    Caffeine Concern Not Asked    Occupational Exposure Not Asked   K[employer] Hazards Not Asked    Sleep Concern Not Asked    Stress Concern Not Asked    Weight Concern Not Asked    Special Diet Not Asked    Back Care Not Asked    Exercise Not Asked    Bike Helmet Not Asked   [address] Not Asked    Self-Exams Not Asked   Social History Narrative    Parents are . Found out late in life mother used donor sperm for pregnancy. ALLERGIES: Ciprofloxacin; Lexapro [escitalopram]; and Pcn [penicillins]    Review of Systems   Constitutional: Positive for activity change. Negative for appetite change, chills and fever. HENT: Negative for congestion, rhinorrhea, sinus pressure, sneezing and sore throat. Eyes: Positive for redness. Negative for photophobia, pain and visual disturbance. Respiratory: Negative for cough and shortness of breath. Cardiovascular: Positive for chest pain and palpitations. Gastrointestinal: Negative for abdominal pain, blood in stool, constipation, diarrhea, nausea and vomiting.    Genitourinary: Negative for difficulty urinating, dysuria, flank pain, frequency, hematuria, menstrual problem, urgency, vaginal bleeding and vaginal discharge. Musculoskeletal: Negative for arthralgias, back pain, myalgias and neck pain. Skin: Negative for rash and wound. Neurological: Negative for syncope, weakness, numbness and headaches. Psychiatric/Behavioral: Negative for self-injury and suicidal ideas. The patient is nervous/anxious. All other systems reviewed and are negative. Vitals:    03/28/20 2328 03/28/20 2330 03/28/20 2340   BP: 133/87 119/74    Pulse: (!) 116  89   Resp: 16     Temp: 98 °F (36.7 °C)  97.9 °F (36.6 °C)   SpO2: 100% 100% 100%   Weight: 76.9 kg (169 lb 8.5 oz)     Height: 5' 5\" (1.651 m)              Physical Exam  Vitals signs and nursing note reviewed. Constitutional:       General: She is not in acute distress. Appearance: Normal appearance. She is well-developed and normal weight. She is not diaphoretic. Comments: Anxious appearing, taking deep breaths intermittently in an effort to try to calm herself but speaking in full sentences in no acute distress. HENT:      Head: Normocephalic and atraumatic. Nose: Nose normal.   Eyes:      Extraocular Movements: Extraocular movements intact. Conjunctiva/sclera: Conjunctivae normal.      Pupils: Pupils are equal, round, and reactive to light. Neck:      Musculoskeletal: Neck supple. Cardiovascular:      Rate and Rhythm: Normal rate and regular rhythm. Heart sounds: Normal heart sounds. Comments: Mildly tachycardic on arrival but tachycardia resolved with rest while discussing symptoms. Pulmonary:      Effort: Pulmonary effort is normal.      Breath sounds: Normal breath sounds. Abdominal:      General: There is no distension. Palpations: Abdomen is soft. Tenderness: There is no abdominal tenderness. Musculoskeletal:         General: No tenderness. Skin:     General: Skin is warm and dry.    Neurological:      General: No focal deficit present. Mental Status: She is alert and oriented to person, place, and time. Cranial Nerves: No cranial nerve deficit. Sensory: No sensory deficit. Motor: No weakness. Coordination: Coordination normal.   Psychiatric:         Attention and Perception: Attention normal.         Mood and Affect: Mood is anxious. Speech: Speech normal.         Behavior: Behavior normal. Behavior is cooperative. Thought Content: Thought content does not include homicidal or suicidal ideation. MDM   25-year-old female presents with panic attack type symptoms. Nonischemic EKG, and given extensive history of prior panic attacks with no other significant cardiac risk factors or history, I have a very low suspicion for ACS at this time. Tachycardia resolved with improvement of anxiety, satting normally on room air with no respiratory distress, low suspicion for PE. Do not feel that laboratory or imaging evaluation is necessary at this time as symptoms are very likely associated with panic attack. She was given a dose of Atarax in the ED and Rx for same and was provided with a list of community resources for further mental health evaluation. Return precautions were given for worsening or concerns with specific emphasis placed on developing suicidal thoughts or worsening medical concerns despite treatment with prescribed medication. This was discussed with the patient at the bedside and she stated both understanding and agreement. Procedures    2329 EKG shows sinus tachycardia with a rate of 121, left axis deviation, with no acute ST or T wave abnormalities suggestive of ischemia.

## 2020-03-29 NOTE — TELEPHONE ENCOUNTER
Reason for Disposition   Dizziness or lightheadedness    Protocols used: CHEST PAIN-ADULT-AH    Caller states she has been feeling fatigued. Today, this evening, she began to have chest pain, palpitations, and nausea. Rates pain 6/10. She is lightheaded at this time. Denies pain in either arm, neck or jaw. Recommendation is to proceed to the ED for further evaluation. Call back if any further needs.

## 2020-03-30 ENCOUNTER — PATIENT MESSAGE (OUTPATIENT)
Dept: FAMILY MEDICINE CLINIC | Age: 30
End: 2020-03-30

## 2020-03-30 ENCOUNTER — TELEPHONE (OUTPATIENT)
Dept: FAMILY MEDICINE CLINIC | Age: 30
End: 2020-03-30

## 2020-03-30 ENCOUNTER — OFFICE VISIT (OUTPATIENT)
Dept: FAMILY MEDICINE CLINIC | Age: 30
End: 2020-03-30

## 2020-03-30 DIAGNOSIS — B34.9 VIRAL SYNDROME: Primary | ICD-10-CM

## 2020-03-30 NOTE — TELEPHONE ENCOUNTER
----- Message from Rosa Isela Velasquez sent at 3/30/2020 11:13 AM EDT -----  Regarding: PAR[facility] Smith / Telephone  Pt would like a phone call back regarding her EKG. Pt has called several times with no phone call back. She would like a phone call as soon as possible.  She feels the results are concerning     Best contact: [phone]

## 2020-03-30 NOTE — TELEPHONE ENCOUNTER
----- Message from Kendra Heath sent at 3/30/2020  7:45 AM EDT -----  Regarding: Dr. Edgar Tineo Message/Vendor Calls    Caller's first and last name:Tania Alvarado      Reason for call:f/up for cold and flu sx      Callback required yes/no and why:yes      Best contact number(s):[phone]      Details to clarify the request:Pt requested a \"Telemedicine\" visit for today for a f/up for cold and flu sx.       Kendra Heath         Please advice the time

## 2020-03-30 NOTE — TELEPHONE ENCOUNTER
Outbound call to patient. Patient states that she is having Shortness of Breath, with palpitations. Patient states that she feels that her shortness of breath is getting worse, and is starting to feel light headed. Advised patient to go to Emergency Room. Patient verbalized understanding.

## 2020-04-01 ENCOUNTER — PATIENT OUTREACH (OUTPATIENT)
Dept: OTHER | Age: 30
End: 2020-04-01

## 2020-04-01 LAB
FLUAV+FLUBV AG NOSE QL IA.RAPID: NEGATIVE POS/NEG
FLUAV+FLUBV AG NOSE QL IA.RAPID: NEGATIVE POS/NEG
S PYO AG THROAT QL: NEGATIVE
VALID INTERNAL CONTROL?: YES
VALID INTERNAL CONTROL?: YES

## 2020-04-01 RX ORDER — VILAZODONE HYDROCHLORIDE 10 MG/1
10 TABLET ORAL DAILY
Qty: 30 TAB | Refills: 1 | Status: SHIPPED | OUTPATIENT
Start: 2020-04-01 | End: 2021-04-07

## 2020-04-01 NOTE — TELEPHONE ENCOUNTER
From: Lea Rosenthal  To: Rosario Simons NP  Sent: 3/30/2020 2:34 PM EDT  Subject: Visit Follow-Up Question    \[facility]\""-     Prior to our visit these are my symptoms:  Fatigue and chest pain since Friday  (ER visit on Saturday night since chest pain came with severe palpitations- EKG only, doc said it was \"crystal clear\" results posted indicate otherwise- please address)  Headache  Shortness of breath when I talk  Slightly elevated temp  Negative flu and strep test at flu clinic today  Anxiety (always, but please do not dismiss me like the ER did on Saturday)  Sore throat  Weak voice  Tender lymph nodes  No real drainage but when I blow my nose in the morning upon waking up it is colored yellowish  Overall body aches in upper torso but pain being mostly in-between my shoulder blades and wrapping about my ribcage    I told you about my suspectiom that my daughter and I experienced COVID19 symptoms Friday the 13th. We both had dry cough and I felt like I had glass shards in my throat and crepitus when I swallowed. I have not had that again but today is Day 5 from when I thoroughly vaccuumed our house/tried to clean up aftermath of Radha's thomason with flu and second unidentified virus. I cleaned out the roller on the bottom of the vaccum. Think I reinfected myself but all I have is the ability to self diagnose because no one can order a diagnostic test fore to take. . my symptoms are worsening with each day. I feel light-headed. I may just go to ER. But please, please send my paperwork in and also fill out Short term disability papers for me? I have been out of work now for almost a month.  If I don't  it's because I've gone to the emergency room

## 2020-04-01 NOTE — PROGRESS NOTES
COVID-19 Screening Initial Follow-up Note    Patient contacted regarding COVID-19 diagnosis. Care Transition Nurse/ Ambulatory Care Manager contacted the patient by telephone to perform post discharge assessment. Verified name and  with patient as identifiers. Provided introduction to self, and explanation of the CTN/ACM role, and reason for call due to risk factors for infection and/or exposure to COVID-19. Symptoms reviewed with patient who verbalized the following symptoms: fever, fatigue, pain or aching joints, cough, shortness of breath, contusion or unusual change in mental status, fast heart rate, no new/worsening symptoms Patient reports that since developing a fever on 3/30, she is actually feeling better. Patient has been out of work since 3/4/2020, caring for her 7 month old daughter, who was ill with the flu. Patient began developing symptoms on 3/26, visited the ED on 3/28-diagnosed with panic attacks/anxiety. Patient was tearful upon answering the call, as she has been isolating herself from her  and daughter for the last 6 days. Due to no new or worsening symptoms encounter was not routed to provider for escalation. Patient has following risk factors of: None. CTN/ACM reviewed discharge instructions, medical action plan and red flags such as increased shortness of breath, increasing fever and signs of decompensation with patient who verbalized understanding. Discussed exposure protocols and quarantine with CDC Guidelines What to do if you are sick with coronavirus disease  Patient who was given an opportunity for questions and concerns. The patient agrees to contact the [facility] exposure line [phone], local [facility] department  [facility]  ([phone] and PCP office for questions related to their healthcare. CTN/ACM provided contact information for future reference.  Patient has been in touch with the [address] Nurse and has spoken with the Absence Line regarding return to work. Encouraged the patient to stay home, hydrate, and rest. Continue isolation.      Reviewed and educated patient on any new and changed medications related to discharge diagnosis     Plan for follow-up call in 1-2 days based on severity of symptoms and risk factors

## 2020-04-01 NOTE — PROGRESS NOTES
Patient was seen at [facility] flu clinic. Please seen scanned form for visit documentation.

## 2020-04-02 ENCOUNTER — VIRTUAL VISIT (OUTPATIENT)
Dept: FAMILY MEDICINE CLINIC | Age: 30
End: 2020-04-02

## 2020-04-02 DIAGNOSIS — B34.9 VIRAL ILLNESS: ICD-10-CM

## 2020-04-02 DIAGNOSIS — F41.1 GAD (GENERALIZED ANXIETY DISORDER): Primary | ICD-10-CM

## 2020-04-02 NOTE — PROGRESS NOTES
[facility] Note  Subjective:      Eva Carmen is a 27 y.o. female who presents for an acute visit with the following chief complaints. Chief Complaint   Patient presents with    Anxiety    URI     I was in the office while conducting this encounter. Consent:  She and/or her healthcare decision maker is aware that this patient-initiated Telehealth encounter is a billable service, with coverage as determined by her insurance carrier. She is aware that she may receive a bill and has provided verbal consent to proceed: Yes    This virtual visit was conducted via [address] Pursuant to the emergency declaration under the [address], [facility] waiver authority and the [employer] Act, this Virtual  Visit was conducted to reduce the patient's risk of exposure to COVID-19 and provide continuity of care for an established patient. Services were provided through a video synchronous discussion virtually to substitute for in-person clinic visit. Due to this being a TeleHealth evaluation, many elements of the physical examination are unable to be assessed. Total Time: minutes: 21-30 minutes. Anxiety  She also presents with new evaluation and treatment of anxiety and depression disorder. Diagnosed greater than 10 years ago, intermittent since. She has the following anxiety symptoms: palpitations, chest pain, shortness of breath, racing thoughts, psychomotor agitation, feelings of losing control, difficulty concentrating. Triggers include work and home stressors; First time mother, COVID anxiety, worried about her job and her husbands health as an essential employee. She denies current suicidal and homicidal ideation. Family history significant for depression. Possible organic causes contributing are: none. Risk factors: previous episode of depression and anxiety. Current therapy: None.  Previous therapies: Zoloft 50 mg daily helped with depression but caused her to feel numb and was not helpful for anxiety. Zoloft 100 mg daily caused agitation and she she self-discontinued 3-4 months ago. Lexapro caused brain zaps. Klonopin TID several years ago. Karina Beaulieu was helpful in the past for depression, he PCP sent new prescription yesterday but she has not started this medication yet. Hydroxyzine was prescribed at ER last week but she has not started this yet either. Previously seen psychiatrist several years ago, interested in seeing new provider. Viral Illness  Patient complains of symptoms of a URI symptoms. Symptoms include fatigue, cough, sore throat. Onset of symptoms was 6-7 days ago, gradually improving since onset. She also c/o low grade fever for the past 3 days, no fever this morning. Feels short of breath that is worse with activity, alleviated with rest. She is drinking moderate amounts of fluids. Evaluation to date: Evaluated via tele-health visit on day 1 and diagnosed with conjunctivitis, empirical topical antibiotics prescribed which she took for 2.5 days before self-discontinuing due to increased irration. Evaluated at ER 5 days ago on 3/28 for chest pain and SOB;  EKG was ST, no ST changes. Dianogesd with panic attack and sent on with atarax. Evaluated at [facility] clinic 3 days ago on 3/30, diagnosed with viral illness, supportive care was advised. Treatment to date: vitamins, vitamin C, Tylenol which is helpful. She has been out of work for the past 3-4 weeks, initially caring for her child who was ill with the flu, then she became sit her sick. She is asking about leave of absence from work related to her viral illnesss. We discussed that she can return to work 7 days after onset of recent viral illness and 3 days fever free without use of anti-pyretics based on current CDC guidelines.  We discussed that I would be agreeable to short term leave of absence related to uncontrolled anxiety, however that this leave is limited to 60 days without being under the care of a psychiatric specialist. She will think about leave and let me know what she decides to do. Advised to also discuss with her manager and HR department for additional resources. Current Outpatient Medications   Medication Sig Dispense Refill    vilazodone (VIIBRYD) 10 mg tab tablet Take 1 Tab by mouth daily. 30 Tab 1    hydrOXYzine pamoate (VISTARIL) 25 mg capsule Take 1 Cap by mouth three (3) times daily as needed for Anxiety. 20 Cap 0    trimethoprim-polymyxin b (POLYTRIM) ophthalmic solution Administer 1 Drop to right eye every four (4) hours for 7 days. Indications: pink eye from bacterial infection 1 Bottle 0    prenatal vit calc,iron,folic (PRENATAL VITAMIN PO) Take  by mouth.  ondansetron (ZOFRAN ODT) 4 mg disintegrating tablet Take 1 Tab by mouth every eight (8) hours as needed for Nausea or Nausea or Vomiting. 15 Tab 0    famotidine (PEPCID) 20 mg tablet Take 1 Tab by mouth two (2) times a day. 30 Tab 0     Allergies   Allergen Reactions    Ciprofloxacin Rash    Lexapro [Escitalopram] Other (comments)     \"brain zaps\"    Pcn [Penicillins] Itching     Past Medical History:   Diagnosis Date    Abnormal Papanicolaou smear of cervix     2013- normal follow up    Headache     Herpes     Type 1--genital    Panic disorder     Psychiatric disorder     depression anxiety         ROS:   Complete review of systems was reviewed with pertinent information listed in HPI. Review of Systems   Constitutional: Positive for fever and malaise/fatigue. Negative for chills, diaphoresis and weight loss. HENT: Positive for congestion and sore throat. Negative for ear discharge, ear pain, hearing loss, nosebleeds, sinus pain and tinnitus. Eyes: Positive for redness. Negative for blurred vision, double vision, photophobia, pain and discharge. Respiratory: Positive for cough and shortness of breath.  Negative for hemoptysis, sputum production, wheezing and stridor. Cardiovascular: Positive for chest pain and palpitations. Negative for orthopnea, claudication, leg swelling and PND. Gastrointestinal: Negative for abdominal pain, constipation, diarrhea, nausea and vomiting. Genitourinary: Negative for dysuria and urgency. Musculoskeletal: Negative for myalgias. Skin: Negative for itching and rash. Psychiatric/Behavioral: Positive for depression. Negative for hallucinations, memory loss, substance abuse and suicidal ideas. The patient is nervous/anxious. The patient does not have insomnia. Objective: There were no vitals taken for this visit. Vitals and Nurse Documentation reviewed. Physical Exam  Constitutional:       General: She is not in acute distress. Appearance: Normal appearance. She is well-developed, well-groomed and normal weight. She is not ill-appearing, toxic-appearing or diaphoretic. HENT:      Head: Normocephalic and atraumatic. Mouth/Throat:      Mouth: Mucous membranes are moist.   Eyes:      General: Lids are normal.      Extraocular Movements: Extraocular movements intact. Conjunctiva/sclera:      Right eye: Right conjunctiva is injected. Left eye: Left conjunctiva is injected. Neck:      Musculoskeletal: Full passive range of motion without pain. Pulmonary:      Effort: Pulmonary effort is normal. No tachypnea, bradypnea, accessory muscle usage or respiratory distress. Skin:     Coloration: Skin is not cyanotic or pale. Neurological:      Mental Status: She is alert and oriented to person, place, and time. Psychiatric:         Attention and Perception: Attention normal.         Mood and Affect: Affect is tearful. Speech: Speech normal.         Behavior: Behavior is cooperative. Thought Content: Thought content normal. Thought content does not include homicidal or suicidal plan.          Cognition and Memory: Cognition normal. Assessment/Plan:     Diagnoses and all orders for this visit:    1. ADRIAN (generalized anxiety disorder): Longstanding issue, currently uncontrolled with increased panic. Advised to start [address] therapy today. Okay to continue hydroxyzine PRN anxiety. Advised to monitor for possible side effects in breastfeeding child which would include sedation, agitation, decreased feedings. Discussed in detail self-care techniques including mindfulness, medication, deep breathing, limit new intake. Advised CBT and she is agreeable virtual visits with her counselor. 4 week follow-up. 2. Viral illness: Onset was 6 days ago, resolving, reports currently afebrile. Favor SOB is related to uncontrolled panic - see treatment above. Advised continued symptomatic therapy; rest, hydration, tylenol PRN fever or discomfort, flonase and nasal saline rinses for nasal congestion. She can return to work 7 days after onset of symptoms and 3 days fever free without antipyretic therapy. Follow-up and Dispositions    · Return in about 4 weeks (around 4/30/2020) for Anxiety.          Discussed expected course/resolution/complications of diagnosis in detail with patient.    Medication risks/benefits/costs/interactions/alternatives discussed with patient.    Pt was given an after visit summary which includes diagnoses, current medications & vitals.  Pt expressed understanding with the diagnosis and plan

## 2020-04-03 ENCOUNTER — PATIENT OUTREACH (OUTPATIENT)
Dept: OTHER | Age: 30
End: 2020-04-03

## 2020-04-03 NOTE — PROGRESS NOTES
Telephonic outreach attempt to follow up with patient regarding respiratory symptoms. Left a discreet VM with a request for a return call. Will attempt to outreach next week.

## 2020-04-08 ENCOUNTER — PATIENT OUTREACH (OUTPATIENT)
Dept: OTHER | Age: 30
End: 2020-04-08

## 2020-04-08 NOTE — PROGRESS NOTES
Telephonic outreach to follow up with patient. Left a discreet VM with a request for a return call to follow up regarding COVID-19 symptoms. Will await a return call and will follow up with patient later this week.

## 2020-04-10 ENCOUNTER — PATIENT OUTREACH (OUTPATIENT)
Dept: OTHER | Age: 30
End: 2020-04-10

## 2020-04-10 NOTE — PROGRESS NOTES
Telephonic outreach to patient to follow up regarding COVID symptoms. Unable to leave a VM, as mail box full. Will attempt one additional outreach before closing episode.

## 2020-04-15 ENCOUNTER — PATIENT OUTREACH (OUTPATIENT)
Dept: OTHER | Age: 30
End: 2020-04-15

## 2020-04-15 NOTE — PROGRESS NOTES
HPRR progress note    Patient eligible for [facility] care management  Discussed the care management program.  Patient agrees to care management services at this time. PMH:   Past Medical History:   Diagnosis Date    Abnormal Papanicolaou smear of cervix     2013- normal follow up    Headache     Herpes     Type 1--genital    Panic disorder     Psychiatric disorder     depression anxiety       Social History:   Social History     Socioeconomic History    Marital status:      Spouse name: Not on file    Number of children: 1    Years of education: Not on file    Highest education level: Not on file   Occupational History    Occupation: Nurse   Social Needs    Financial resource strain: Not on file    Food insecurity     Worry: Not on file     Inability: Not on file   [employer] needs     Medical: Not on file     Non-medical: Not on file   Tobacco Use    Smoking status: Former Smoker     Years: 5.00     Last attempt to quit: 06/2016     Years since quitting: 3.8    Smokeless tobacco: Never Used   Substance and Sexual Activity    Alcohol use:  Yes    Drug use: Not Currently     Comment: years ago    Sexual activity: Yes     Partners: Male     Birth control/protection: None     Comment: breast feeding   Lifestyle    Physical activity     Days per week: Not on file     Minutes per session: Not on file    Stress: Not on file   Relationships    Social connections     Talks on phone: Not on file     Gets together: Not on file     Attends [employer] service: Not on file     Active member of club or organization: Not on file     Attends meetings of clubs or organizations: Not on file     Relationship status: Not on file    Intimate partner violence     Fear of current or ex partner: Not on file     Emotionally abused: Not on file     Physically abused: Not on file     Forced sexual activity: Not on file   Other Topics Concern   [address] Service Not Asked    Blood Transfusions Not Asked    Caffeine Concern Not Asked    Occupational Exposure Not Asked    Hobby Hazards Not Asked    Sleep Concern Not Asked    Stress Concern Not Asked    Weight Concern Not Asked    Special Diet Not Asked    Back Care Not Asked    Exercise Not Asked    Bike Helmet Not Asked   [address] Not Asked    Self-Exams Not Asked   Social History Narrative    Parents are . Found out late in life mother used donor sperm for pregnancy. Patient's primary care provider relationship reviewed with patient and modified, as applicable. Care management assessment completed:    Condition Focused Assessment: Behavioral Health Focused Assessment    Any symptoms of Anxiety:   Chest pain? yes   Shortness of breath? yes   Racing pulse/Increased heart rate? yes  Any symptoms of Depression:   Suicidal thoughts or feelings of hurting yourself? no   Feelings of hopelessness? no   Increased fatigue or loss of energy? yes   Loss of appetite? no   Sleeping too much? no  Possible Manic Symptoms:   Racing thoughts? no   Impulsivity, like buying sprees? no   Unusual talkativeness? no   Decreased need for sleep? no   Increased energy? no  Other BH Symptoms:   Decreased ability to think or concentrate? yes  o If yes, is this affecting your daily activities? no   Auditory and Visual hallucinations? no  o If yes, describe: NA   Delusional thinking? no   Homicidal thoughts or aggressive thoughts/actions towards others no  o If yes, describe: NA   Any issues with alcohol or drug abuse? no  o Most recent use and length of use? NA  o Are you currently undergoing treatment? no    Are you on any medications that require lab follow-up? (Depakote, Lithium, Risperdal, Geodon) no  Review any abnormal labs (valproic, lithium) with patient None    Patient has an appointment with a psychiatrist at [address] [place] [address] and has individual counseling with Freddy Lu. Requesting some medication management. Medications:  New Medications at Discharge: none  Changed Medications at Discharge: None  Discontinued Medications at Discharge: None  Current Outpatient Medications   Medication Sig    vilazodone (VIIBRYD) 10 mg tab tablet Take 1 Tab by mouth daily.  hydrOXYzine pamoate (VISTARIL) 25 mg capsule Take 1 Cap by mouth three (3) times daily as needed for Anxiety.  prenatal vit calc,iron,folic (PRENATAL VITAMIN PO) Take  by mouth.  ondansetron (ZOFRAN ODT) 4 mg disintegrating tablet Take 1 Tab by mouth every eight (8) hours as needed for Nausea or Nausea or Vomiting.  famotidine (PEPCID) 20 mg tablet Take 1 Tab by mouth two (2) times a day. No current facility-administered medications for this visit. There are no discontinued medications. Performed medication reconciliation with patient, and patient verbalizes understanding of administration of home medications. There were no barriers to obtaining medications identified at this time. Preventive Care     Health Maintenance   Topic Date Due    Influenza Age 5 to Adult  08/01/2020    PAP AKA CERVICAL CYTOLOGY  10/24/2021    DTaP/Tdap/Td series (2 - Td) 06/21/2029    Pneumococcal 0-64 years  Aged Out       Goals   Goals    None          Barriers/Support system:  patient and spouse      Barriers/Challenges to Care: []  Decline in memory    []  Language barrier     [x]  Emotional                  []  Limited mobility  []  Lack of motivation     [] Vision, hearing or cognitive impairment [x]  Knowledge [] Financial Barriers []  Lack of support  [x]  Pain []  Other     PCP/Specialist follow up:   Future Appointments   Date Time Provider Lisa Faith   7/10/2020  8:45 AM Abhijeet Rojas  thea De Roge   7/31/2020  9:00 AM Fouzia Isaacs MD [address] [place] [address]      Reviewed red flags with patient, and patient verbalizes understanding. Patient given an opportunity to ask questions.  No other clinical/social/functional needs noted.   The patient agrees to contact the PCP office for questions related to their healthcare. The patient expressed thanks, offered no additional questions and ended the call. Plan for next call:  Discuss medication changes with new psychiatrist. Identify and develop goals.

## 2020-04-30 ENCOUNTER — PATIENT OUTREACH (OUTPATIENT)
Dept: OTHER | Age: 30
End: 2020-04-30

## 2020-04-30 NOTE — PROGRESS NOTES
Telephonic outreach to follow up with patient. Left a discreet VM with a request for a return call. Will await a call and will continue to outreach patient.

## 2020-05-01 ENCOUNTER — OFFICE VISIT (OUTPATIENT)
Dept: PRIMARY CARE CLINIC | Age: 30
End: 2020-05-01

## 2020-05-01 DIAGNOSIS — R68.89 FLU-LIKE SYMPTOMS: Primary | ICD-10-CM

## 2020-05-01 NOTE — PROGRESS NOTES
Patient was seen at [facility] flu clinic. Patient consented to receive care and bill insurance. Please seen scanned note for visit documentation.

## 2020-05-02 LAB — SARS-COV-2, NAA: NOT DETECTED

## 2020-05-04 NOTE — PROGRESS NOTES
Outbound call to patient. Name and  verified. Reviewed recent lab results with patient. She expressed understanding.

## 2020-05-05 ENCOUNTER — VIRTUAL VISIT (OUTPATIENT)
Dept: FAMILY MEDICINE CLINIC | Age: 30
End: 2020-05-05

## 2020-05-05 VITALS
DIASTOLIC BLOOD PRESSURE: 74 MMHG | TEMPERATURE: 98.5 F | HEART RATE: 110 BPM | OXYGEN SATURATION: 98 % | SYSTOLIC BLOOD PRESSURE: 120 MMHG

## 2020-05-05 DIAGNOSIS — R00.2 PALPITATIONS: ICD-10-CM

## 2020-05-05 DIAGNOSIS — R06.02 SHORTNESS OF BREATH: Primary | ICD-10-CM

## 2020-05-05 DIAGNOSIS — F41.1 GAD (GENERALIZED ANXIETY DISORDER): ICD-10-CM

## 2020-05-05 DIAGNOSIS — R23.3 EASY BRUISING: ICD-10-CM

## 2020-05-05 DIAGNOSIS — R68.89 COLD INTOLERANCE: ICD-10-CM

## 2020-05-05 NOTE — PROGRESS NOTES
[facility] Note  Subjective:      Catarino Frausto is a 27 y.o. female who presents for an acute visit with the following chief complaints. Chief Complaint   Patient presents with   [facility]     Lab review. I was in the office while conducting this encounter. Consent:  She and/or her healthcare decision maker is aware that this patient-initiated Telehealth encounter is a billable service, with coverage as determined by her insurance carrier. She is aware that she may receive a bill and has provided verbal consent to proceed: Yes    This virtual visit was conducted via [employer] ([employer]). Pursuant to the emergency declaration under the [facility], [facility] waiver authority and the [employer] Act, this Virtual  Visit was conducted to reduce the patient's risk of exposure to COVID-19 and provide continuity of care for an established patient. Services were provided through a video synchronous discussion virtually to substitute for in-person clinic visit. Due to this being a TeleHealth evaluation, many elements of the physical examination are unable to be assessed. Total Time: minutes: 21-30 minutes\. Shortness of breath  She reports a return of \"cardio/respiratory symptoms\" 4 days ago. Symptoms included episodes of breathlessness, palpitations, and thoracic back pain described as a pinching or stabbing pain discomfort in the center of shoulder blades. She was evaluated at the \"flu clinic\" the morning of the onset of symptoms. She was tested for COVID-19 and results were negative. Since this time she reports continued palpitations, cold feet, new bruising. Brusing started on Friday, brusing is located on the shins and calves, forearms without associated injury. She denies family history of clotting disorder. She has had surgeries in the past without bleeding difficulties.  She had several weeks of a viral illness in 3/2020 which she feels could have been [address] but was unable to get testing at that time. She is worried about possible complications from possible COVID19 infection. Anxiety  She has history of anxiety and depression. Diagnosed greater than 10 years ago, intermittent since. She has the following anxiety symptoms: palpitations, chest pain, shortness of breath, racing thoughts, psychomotor agitation, feelings of losing control, difficulty concentrating. Triggers include work and home stressors; First time mother, John pandemic anxiety, worried about her job and her husbands health as an essential employee. She denies current suicidal and homicidal ideation. Family history significant for depression. Possible organic causes contributing are: none. Risk factors: previous episode of depression and anxiety. Current therapy: Viibryd 5 mg daily started about 3 weeks ago. Currently managed by Ilya Delong NP. Previous therapies: Zoloft 50 mg daily helped with depression but caused her to feel numb and was not helpful for anxiety. Zoloft 100 mg daily caused agitation and she she self-discontinued 3-4 months ago. Lexapro caused brain zaps. Klonopin TID several years ago. Hydroxyzine was prescribed but she is not taking this. Current Outpatient Medications   Medication Sig Dispense Refill    vilazodone (VIIBRYD) 10 mg tab tablet Take 1 Tab by mouth daily. (Patient taking differently: Take 5 mg by mouth daily.) 30 Tab 1    prenatal vit calc,iron,folic (PRENATAL VITAMIN PO) Take  by mouth. Allergies   Allergen Reactions    Ciprofloxacin Rash    Lexapro [Escitalopram] Other (comments)     \"brain zaps\"    Pcn [Penicillins] Itching       ROS:   Complete review of systems was reviewed with pertinent information listed in HPI. Review of Systems   Constitutional: Negative for chills, diaphoresis, fever, malaise/fatigue and weight loss.    HENT: Negative for congestion, hearing loss and sore throat. Eyes: Negative for blurred vision. Respiratory: Negative for cough and shortness of breath. Cardiovascular: Positive for palpitations. Negative for chest pain. Gastrointestinal: Negative for abdominal pain, blood in stool, constipation, heartburn, melena, nausea and vomiting. A loose stools 3-4 days ago. Now resolved    Musculoskeletal: Positive for back pain. Negative for falls, joint pain, myalgias and neck pain. Skin: Negative for itching and rash. Endo/Heme/Allergies: Negative for environmental allergies and polydipsia. Bruises/bleeds easily. Psychiatric/Behavioral: Negative for depression, hallucinations, memory loss, substance abuse and suicidal ideas. The patient is nervous/anxious. The patient does not have insomnia. Objective:     Visit Vitals  /74   Pulse (!) 110   Temp 98.5 °F (36.9 °C) (Oral)   SpO2 98%       Vitals and Nurse Documentation reviewed. Physical Exam  Constitutional:       General: She is not in acute distress. Appearance: Normal appearance. She is well-developed, well-groomed and normal weight. She is not ill-appearing, toxic-appearing or diaphoretic. HENT:      Head: Normocephalic and atraumatic. Right Ear: Hearing normal.      Left Ear: Hearing normal.      Nose: Nose normal. No nasal deformity. Mouth/Throat:      Lips: Pink. No lesions. Mouth: Mucous membranes are moist.   Eyes:      General: Lids are normal.      Conjunctiva/sclera: Conjunctivae normal.   Pulmonary:      Effort: Pulmonary effort is normal.   Neurological:      Mental Status: She is alert and oriented to person, place, and time. Psychiatric:         Attention and Perception: Attention normal.         Speech: Speech normal.         Behavior: Behavior normal. Behavior is cooperative. Comments: She appears mild anxious, sad and worried.           Assessment/Plan:     Cyrus Easley is a 27 y.o. female with PMH of anxiety, panic disorder, depression, presenting with episodes of shortness of breath, palpitations, chest and thoracic back discomfort, not present currently. She is in no respiratory distress, however she does appears anxious and emotionally distressed. No SI/HI. We discuss in detail how uncontrolled anxiety can manifest as her described symptoms however she is very worried about organic causes. Will proceed with chest x-ray and d-dimer for evaluation of SOB and chest tightness. Will proceed with chest CT if d-dimer is positive. ECG from 3/28/20 reviewed, ST with non specific t-wave changes. Consider repeating EKG verses cardiology referral if above work-up is unremarkable. Will obtain CBC, CMP, TSH for palpitations, bruising, cold intolerance. Advised that she continue to take Viibryd, start CBT and follow-up with psychiatrist for better control of anxiety. We discussed limiting COVID19 news intake as this seems to be a large trigger for her. Advised ER for worsening symptoms. Diagnoses and all orders for this visit:    1. Shortness of breath  -     XR CHEST PA LAT; Future  -     D DIMER  -     CBC WITH AUTOMATED DIFF  -     METABOLIC PANEL, COMPREHENSIVE  -     TSH AND FREE T4    2. Palpitations  -     CBC WITH AUTOMATED DIFF  -     METABOLIC PANEL, COMPREHENSIVE  -     TSH AND FREE T4    3. Cold intolerance  -     CBC WITH AUTOMATED DIFF  -     TSH AND FREE T4    4. ADRIAN (generalized anxiety disorder)      Follow-up and Dispositions    · Return for depending on lab results .        Discussed expected course/resolution/complications of diagnosis in detail with patient.  Medication risks/benefits/costs/interactions/alternatives discussed with patient. Pt expressed understanding with the diagnosis and plan

## 2020-05-07 DIAGNOSIS — R79.89 ELEVATED D-DIMER: ICD-10-CM

## 2020-05-07 DIAGNOSIS — R07.89 ATYPICAL CHEST PAIN: Primary | ICD-10-CM

## 2020-05-07 DIAGNOSIS — R06.02 SHORTNESS OF BREATH: ICD-10-CM

## 2020-05-07 LAB
ALBUMIN SERPL-MCNC: 4.9 G/DL (ref 3.9–5)
ALBUMIN/GLOB SERPL: 1.7 {RATIO} (ref 1.2–2.2)
ALP SERPL-CCNC: 81 IU/L (ref 39–117)
ALT SERPL-CCNC: 12 IU/L (ref 0–32)
AST SERPL-CCNC: 16 IU/L (ref 0–40)
BASOPHILS # BLD AUTO: 0 X10E3/UL (ref 0–0.2)
BASOPHILS NFR BLD AUTO: 1 %
BILIRUB SERPL-MCNC: 0.3 MG/DL (ref 0–1.2)
BUN SERPL-MCNC: 11 MG/DL (ref 6–20)
BUN/CREAT SERPL: 14 (ref 9–23)
CALCIUM SERPL-MCNC: 9.7 MG/DL (ref 8.7–10.2)
CHLORIDE SERPL-SCNC: 102 MMOL/L (ref 96–106)
CO2 SERPL-SCNC: 22 MMOL/L (ref 20–29)
CREAT SERPL-MCNC: 0.76 MG/DL (ref 0.57–1)
D DIMER PPP FEU-MCNC: 1.01 MG/L FEU (ref 0–0.49)
EOSINOPHIL # BLD AUTO: 0 X10E3/UL (ref 0–0.4)
EOSINOPHIL NFR BLD AUTO: 1 %
ERYTHROCYTE [DISTWIDTH] IN BLOOD BY AUTOMATED COUNT: 13.6 % (ref 11.7–15.4)
GLOBULIN SER CALC-MCNC: 2.9 G/DL (ref 1.5–4.5)
GLUCOSE SERPL-MCNC: 84 MG/DL (ref 65–99)
HCT VFR BLD AUTO: 40.6 % (ref 34–46.6)
HGB BLD-MCNC: 14.1 G/DL (ref 11.1–15.9)
IMM GRANULOCYTES # BLD AUTO: 0 X10E3/UL (ref 0–0.1)
IMM GRANULOCYTES NFR BLD AUTO: 1 %
LYMPHOCYTES # BLD AUTO: 1.9 X10E3/UL (ref 0.7–3.1)
LYMPHOCYTES NFR BLD AUTO: 29 %
MCH RBC QN AUTO: 31.3 PG (ref 26.6–33)
MCHC RBC AUTO-ENTMCNC: 34.7 G/DL (ref 31.5–35.7)
MCV RBC AUTO: 90 FL (ref 79–97)
MONOCYTES # BLD AUTO: 0.5 X10E3/UL (ref 0.1–0.9)
MONOCYTES NFR BLD AUTO: 8 %
NEUTROPHILS # BLD AUTO: 4 X10E3/UL (ref 1.4–7)
NEUTROPHILS NFR BLD AUTO: 60 %
PLATELET # BLD AUTO: 245 X10E3/UL (ref 150–450)
POTASSIUM SERPL-SCNC: 4.1 MMOL/L (ref 3.5–5.2)
PROT SERPL-MCNC: 7.8 G/DL (ref 6–8.5)
RBC # BLD AUTO: 4.51 X10E6/UL (ref 3.77–5.28)
SODIUM SERPL-SCNC: 142 MMOL/L (ref 134–144)
T4 FREE SERPL-MCNC: 1.29 NG/DL (ref 0.82–1.77)
TSH SERPL DL<=0.005 MIU/L-ACNC: 1.45 UIU/ML (ref 0.45–4.5)
WBC # BLD AUTO: 6.5 X10E3/UL (ref 3.4–10.8)

## 2020-05-07 NOTE — PROGRESS NOTES
Called and spoke with patient. D-dimer is mildly elevated. She reports she continues to have intermittent symptoms of shortness of breath. She is currently in no distress. She is agreeable to CTA imaging to rule out PE. Advised not to use breast milk for 24 hours after imaging with contrast. Advised ER for worsening symptoms. Remaining labs stable.

## 2020-05-13 ENCOUNTER — PATIENT OUTREACH (OUTPATIENT)
Dept: OTHER | Age: 30
End: 2020-05-13

## 2020-05-13 NOTE — PROGRESS NOTES
Telephonic outreach to follow up with patient. Patient answered the call and quickly realized that she was supposed to be on a virtual visit at 2:30. This CM will outreach patient again on 5/14.

## 2020-05-14 ENCOUNTER — PATIENT OUTREACH (OUTPATIENT)
Dept: OTHER | Age: 30
End: 2020-05-14

## 2020-05-14 NOTE — PROGRESS NOTES
Telephonic outreach to follow up with patient. Patient verified name and . States that she is still having some respiratory symptoms. Recently saw a new physician, Dr. Livier Juarez with [facility]. Blood work drawn and chest x ray completed on 20. Patient waiting on results and has a virtual appointment with [facility] scheduled. Patient reports chest discomfort, \"feels like you are scuba diving and trying to breathe through the snorkel\"  Patient reports that finances are extremely tight and patient is stressing regarding her ability to pay her medical bills. Requested resources for assistance. Pay will be referred to JEAN-PIERRE Dee   Patient still having some anxiety, sleeping as well as can be expected with an infant in the home. Appetite is steady. Patient encouraged that she now has a physician who is not just \"assuming that my symptoms are related to anxiety\"  This CM will continue to follow patient to discuss lab and chest xray results. Will continue to assist with identifying ways to cope with stressors.

## 2020-05-15 ENCOUNTER — PATIENT OUTREACH (OUTPATIENT)
Dept: OTHER | Age: 30
End: 2020-05-15

## 2020-05-15 NOTE — PROGRESS NOTES
JEAN-PIERRE FISHER contacted patient for evaluation. Patient identifiers confirmed, zip code and . Patient concerns:  Ms. Cindy Joseph stated that she called the Nurses Access Line prior to visiting the [address] [place] [address] ER. Nurses Access Line directed her the ER. Weeks later, patient received a bill for the higher cost of service because the condition was not warranted an emergency. Patient stated that she received 2 bills with a similar scenario. Ms. Cindy Joseph added that she is behind on her mortgage and car note. She requested assistance from family members but is unsure if she will receive. Plan of action:  JEAN-PIERRE will provide patient applications for [employer]. JEAN-PIERRE FISHER will also research how to address ER bills.

## 2020-05-18 ENCOUNTER — PATIENT OUTREACH (OUTPATIENT)
Dept: OTHER | Age: 30
End: 2020-05-18

## 2020-05-18 NOTE — PROGRESS NOTES
JEAN-PIERRE FISHER contacted patient for follow-up. Patient identifiers confirmed, zip code and . Patient concerns  ER bills that are not affordable. Call content  Ms. Laura Diaz stated that she received the financial assistance applications ([employer], [facility] Emergency Assistance) that [facility] emailed to her. She stated that she will complete applications as needed. JEAN-PIERRE FISHER informed patient that the ER bills had been escalated. Patient was appreciative. Plan of action  [facility] will follow-up with patient once additional information is obtained about the ER bills.

## 2020-05-28 ENCOUNTER — PATIENT OUTREACH (OUTPATIENT)
Dept: OTHER | Age: 30
End: 2020-05-28

## 2020-05-28 NOTE — PROGRESS NOTES
Outreach to patient to follow up, currently enrolled in care management services. Left a discreet VM, providing contact information. Will await a return call and follow up in 2-3 weeks.

## 2020-06-02 ENCOUNTER — NURSE TRIAGE (OUTPATIENT)
Dept: OTHER | Facility: CLINIC | Age: 30
End: 2020-06-02

## 2020-06-02 NOTE — TELEPHONE ENCOUNTER
Patient left message to call back. Called patient. She has been having pain in between shoulder blades and very slight inconsistent cough for quite a while. She also states she has some bruising. States her feet get cold. Feels stuffy at night. Feels \"air hungry\" at night. Has hx elevated D dimer. She is able to walk and speak without SOB. Speaking in strong complete sentences. She states there are a couple new bruise she cannot account for. States she has 3. The bruises are about the size of a nickel on legs, scattered. More on right than left. She has already seen her provider and tests have been ordered, but she has not gotten the CT of the chest because of the expense and the doubt that it's a clot. Discussed risks of PE and justification for ordering the test. Strongly encouraged her to get the CT and to call provider to discuss. Please do not respond to the triage nurse through this encounter. Any subsequent communication should be directly with the patient.       Reason for Disposition   [1] Not caused by an injury AND [2] < 5 unexplained bruises    Protocols used: BRUISES-ADULT-

## 2020-06-04 ENCOUNTER — PATIENT OUTREACH (OUTPATIENT)
Dept: OTHER | Age: 30
End: 2020-06-04

## 2020-06-04 NOTE — PROGRESS NOTES
MSW ECM received the following email. Bradley Ibarra,      Thank you so much for your help. Have you gotten anything back on this yet? [facility] [employer] keeps sending me statements. .. and I need to have another test completed that is very expensive after getting a quote from insurance. Just concerned. Thanks,  Cyrus Easley      MSW ECM responded with the following email. Damián Hampton,  I know this situation is incredibly stressful. Im sorry you are dealing with this in additional to addressing medical challenges. The message that I received back was that the charges were accurate. The largest amount was from 3/5 when the pediatrician advised you to go to the ER, not the Nurses Line. I can find if there is an appeal process.      Vero Cronin MSW, [facility]

## 2020-06-12 ENCOUNTER — PATIENT OUTREACH (OUTPATIENT)
Dept: OTHER | Age: 30
End: 2020-06-12

## 2020-06-12 NOTE — PROGRESS NOTES
Telephonic outreach attempt to follow up with patient. Left a discreet VM with a request for a return call. Will continue to outreach and await for a return call.

## 2020-06-26 ENCOUNTER — PATIENT OUTREACH (OUTPATIENT)
Dept: OTHER | Age: 30
End: 2020-06-26

## 2020-06-26 NOTE — PROGRESS NOTES
Telephonic outreach attempt to follow up with patient. Unable to leave a message, as the voicemail is full. Will reach out to patient an additional time before closing.

## 2020-07-14 ENCOUNTER — PATIENT OUTREACH (OUTPATIENT)
Dept: OTHER | Age: 30
End: 2020-07-14

## 2020-07-14 NOTE — PROGRESS NOTES
Telephonic outreach attempt to follow up with patient and assess progress. Left a discreet VM with a request for a return call, providing contact information. Will continue outreach attempts.

## 2020-07-24 ENCOUNTER — VIRTUAL VISIT (OUTPATIENT)
Dept: FAMILY MEDICINE CLINIC | Age: 30
End: 2020-07-24

## 2020-07-24 DIAGNOSIS — F31.9 BIPOLAR DEPRESSION (HCC): Primary | ICD-10-CM

## 2020-07-24 PROBLEM — Z3A.24 24 WEEKS GESTATION OF PREGNANCY: Status: RESOLVED | Noted: 2019-04-08 | Resolved: 2020-07-24

## 2020-07-24 PROBLEM — Z34.90 PREGNANCY: Status: RESOLVED | Noted: 2019-07-23 | Resolved: 2020-07-24

## 2020-07-24 PROBLEM — F33.0 MILD EPISODE OF RECURRENT MAJOR DEPRESSIVE DISORDER (HCC): Status: RESOLVED | Noted: 2018-10-08 | Resolved: 2020-07-24

## 2020-07-24 RX ORDER — OLANZAPINE 2.5 MG/1
2.5 TABLET ORAL
COMMUNITY
End: 2021-04-07

## 2020-07-24 NOTE — PROGRESS NOTES
Assessment/Plan:     Diagnoses and all orders for this visit:    1. Bipolar depression ([address]    She was following up regarding her FMLA paperwork. She was referred to her new PCP for updated paperwork evaluation. Follow-up and Dispositions    · Return if symptoms worsen or fail to improve. Discussed expected course/resolution/complications of diagnosis in detail with patient. Medication risks/benefits/costs/interactions/alternatives discussed with patient. Pt was given after visit summary which includes diagnoses, current medications & vitals. Pt expressed understanding with the diagnosis and plan    The patient has consented for synchronous (real-time) Telemedicine (audio-only technology) on 7/24/2020 for their care to be delivered over telemedicine in place of their regularly scheduled office visit pursuant to the emergency declaration under the [address] [place] [address], [facility] waiver authority and the [employer] Act, this Virtual  Visit was conducted by the practitioner who is located in the medical office in [place], [place], with patient's consent, to reduce the patient's risk of exposure to COVID-19 and provide continuity of care. Visit Length: 15 minutes        Subjective:      Tim Zheng is a 27 y.o. female who presents for had concerns including Medication Evaluation. She has re-established with a different PCP during my absence. She reports an episode of psychosis several months ago. She is followed by psychiatry. She reports her new PCP suspected her previous illnesses were COVID related then subsequently led to cytokine storm. Testing and CT Lungs were normal. DDimer 1.01. Patient Active Problem List   Diagnosis Code    Obesity (BMI 30-39. 9) E66.9    Bipolar depression (HCC) F31.9       Current Outpatient Medications   Medication Sig Dispense Refill    OLANZapine (ZyPREXA) 2.5 mg tablet Take 2.5 mg by mouth nightly.  vilazodone (VIIBRYD) 10 mg tab tablet Take 1 Tab by mouth daily. (Patient taking differently: Take 20 mg by mouth daily.) 30 Tab 1    prenatal vit calc,iron,folic (PRENATAL VITAMIN PO) Take  by mouth. Allergies   Allergen Reactions    Ciprofloxacin Rash    Lexapro [Escitalopram] Other (comments)     \"brain zaps\"    Pcn [Penicillins] Itching       ROS:   Review of Systems   Constitutional: Positive for malaise/fatigue and weight loss. Eyes: Negative for blurred vision. Respiratory: Negative for shortness of breath. Cardiovascular: Negative for chest pain. Psychiatric/Behavioral: Positive for depression. Objective: There were no vitals taken for this visit. Vitals and Nurse Documentation reviewed. Physical Exam  Vitals reviewed: physical exam deferred secondary to audio only.

## 2020-08-10 ENCOUNTER — PATIENT OUTREACH (OUTPATIENT)
Dept: OTHER | Age: 30
End: 2020-08-10

## 2020-08-10 NOTE — PROGRESS NOTES
Telephonic outreach to patient to follow up and assess progress. Patient verified  and zip code. She discussed having been working from home for several months, improving and returning to the office, only to be now working remotely again related to not feeling well and having symptoms occur again. Experiencing increased stress and inflammation on her body. Patient is now seeing Dr. Kirt Tang for primary care and Hannah Bailon at [address] Medication reconciliation completed with patient, due to changes in physicians. Patient began taking N-Acetyl-L-Cysteine and a multivitamin daily. Patient recently tested for COVID, which was negative. She was also tested for antibodies. Focus now on returning to work in the office and improving her health and wellness. Will discuss medication effectiveness and upcoming labs, on next outreach.

## 2020-08-21 ENCOUNTER — OFFICE VISIT (OUTPATIENT)
Dept: URGENT CARE | Age: 30
End: 2020-08-21
Payer: COMMERCIAL

## 2020-08-21 VITALS — HEART RATE: 77 BPM | RESPIRATION RATE: 17 BRPM | OXYGEN SATURATION: 97 % | TEMPERATURE: 99 F

## 2020-08-21 DIAGNOSIS — Z20.822 ENCOUNTER FOR LABORATORY TESTING FOR COVID-19 VIRUS: Primary | ICD-10-CM

## 2020-08-21 PROCEDURE — S9083 URGENT CARE CENTER GLOBAL: HCPCS | Performed by: FAMILY MEDICINE

## 2020-08-23 LAB — SARS-COV-2, NAA: NOT DETECTED

## 2020-08-25 ENCOUNTER — HOSPITAL ENCOUNTER (OUTPATIENT)
Dept: NON INVASIVE DIAGNOSTICS | Age: 30
Discharge: HOME | End: 2020-08-25
Attending: FAMILY MEDICINE
Payer: COMMERCIAL

## 2020-08-25 VITALS
SYSTOLIC BLOOD PRESSURE: 116 MMHG | DIASTOLIC BLOOD PRESSURE: 68 MMHG | WEIGHT: 170 LBS | HEIGHT: 65 IN | BODY MASS INDEX: 28.32 KG/M2

## 2020-08-25 DIAGNOSIS — R07.89 OTHER CHEST PAIN: ICD-10-CM

## 2020-08-25 LAB
STRESS ANGINA INDEX: 0
STRESS BASELINE DIAS BP: 68 MMHG
STRESS BASELINE HR: 71 BPM
STRESS BASELINE SYS BP: 116 MMHG
STRESS ESTIMATED WORKLOAD: 13.4 METS
STRESS EXERCISE DUR MIN: NORMAL
STRESS PEAK DIAS BP: 78 MMHG
STRESS PEAK SYS BP: 138 MMHG
STRESS PERCENT HR ACHIEVED: 97 %
STRESS POST PEAK HR: 184 BPM
STRESS RATE PRESSURE PRODUCT: NORMAL BPM*MMHG
STRESS SR DUKE TREADMILL SCORE: 0
STRESS ST DEPRESSION: 0 MM
STRESS ST ELEVATION: 0 MM
STRESS TARGET HR: 190 BPM

## 2020-08-25 PROCEDURE — 93351 STRESS TTE COMPLETE: CPT

## 2020-08-28 ENCOUNTER — PATIENT OUTREACH (OUTPATIENT)
Dept: OTHER | Age: 30
End: 2020-08-28

## 2020-08-28 NOTE — PROGRESS NOTES
Telephonic outreach to follow up with patient. Verified patient's  and zip code. Patient presented down in tone, stated that she is very stress. 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 She is concerned that she will lose her benefits as well. Discuss options and patient states that this is all very new right now, she will begin pursuing other opportunities very soon. Discussed her current medications, patient states that her doctor is monitoring them for the next few months, the will determine if changes will be needed. Will follow up with patient again in 2-3 weeks to see where situation sits.

## 2020-09-21 ENCOUNTER — PATIENT OUTREACH (OUTPATIENT)
Dept: OTHER | Age: 30
End: 2020-09-21

## 2020-09-21 NOTE — PROGRESS NOTES
Telephonic outreach attempt to follow up with patient to assess progress towards goals. Left a discreet VM with a request for a return call. Will continue attempts to outreach patient.

## 2020-10-05 ENCOUNTER — PATIENT OUTREACH (OUTPATIENT)
Dept: OTHER | Age: 30
End: 2020-10-05

## 2020-10-12 ENCOUNTER — PATIENT OUTREACH (OUTPATIENT)
Dept: OTHER | Age: 30
End: 2020-10-12

## 2020-10-28 ENCOUNTER — PATIENT OUTREACH (OUTPATIENT)
Dept: OTHER | Age: 30
End: 2020-10-28

## 2020-10-28 NOTE — PROGRESS NOTES
Telephonic outreach attempt to follow up with patient to assess progress towards goals. Left a discreet VM with a request for a return call. Will continue attempts to outreach patient. Will await a return call or close episode.

## 2020-11-12 ENCOUNTER — PATIENT OUTREACH (OUTPATIENT)
Dept: OTHER | Age: 30
End: 2020-11-12

## 2020-11-12 NOTE — LETTER
11/12/2020 11:12 AM 
 
Ms. Tj Alcantara [address] [facility] [address] I have been trying to reach you for a follow up call for services with our Employee Care Management Program. Your wellbeing is very important to us. With continued partnership in the [facility] AND SURGERY [facility] program, we hope to work with you to optimize your health and increase your quality of life. I look forward to continuing to work with you. Please contact me at your convenience and we can schedule a time that works best for you. Sincerely, George Lauren, RN George Lauren RN  Employee Care Manager [address] [place] [address], [address] [place] [address] [address] [facility] Cell [phone] Fax [email or web address]

## 2021-02-22 ENCOUNTER — TRANSCRIBE ORDER (OUTPATIENT)
Dept: SCHEDULING | Age: 31
End: 2021-02-22

## 2021-02-22 DIAGNOSIS — Z87.19 PERSONAL HISTORY OF UNSPECIFIED DIGESTIVE DISEASE: ICD-10-CM

## 2021-02-22 DIAGNOSIS — R10.13 ABDOMINAL PAIN, EPIGASTRIC: Primary | ICD-10-CM

## 2021-02-23 ENCOUNTER — TRANSCRIBE ORDER (OUTPATIENT)
Dept: SCHEDULING | Age: 31
End: 2021-02-23

## 2021-02-23 DIAGNOSIS — R10.11 RUQ PAIN: Primary | ICD-10-CM

## 2021-04-07 ENCOUNTER — OFFICE VISIT (OUTPATIENT)
Dept: URGENT CARE | Age: 31
End: 2021-04-07
Payer: COMMERCIAL

## 2021-04-07 VITALS — RESPIRATION RATE: 16 BRPM | HEART RATE: 87 BPM | TEMPERATURE: 98 F | OXYGEN SATURATION: 99 %

## 2021-04-07 DIAGNOSIS — Z20.822 SUSPECTED COVID-19 VIRUS INFECTION: Primary | ICD-10-CM

## 2021-04-07 PROCEDURE — S9083 URGENT CARE CENTER GLOBAL: HCPCS | Performed by: NURSE PRACTITIONER

## 2021-04-07 NOTE — PROGRESS NOTES
Subjective: (As above and below)       This patient was seen in [facility] Clinic at [address] [place] [address] Urgent Care while outdoors at their vehicle due to COVID-19 pandemic with PPE and focused examination in order to decrease community viral transmission. The patient/guardian gave verbal consent to treat. Chief Complaint   Patient presents with    Concern For COVID-19 (Coronavirus)     Pt c/o nasal congestion, fatigue, body aches and loss of taste since Sunday     Neal Lawson is a 32 y.o. female who presents for evaluation of: nasal congestion, fatigue, body aches and loss of taste. Symptom onset 3 days ago . Preceding illness: none. No other identified aggravating or alleviating factors. Symptoms are constant and overall not improved. Promotes no decrease in PO intake of fluids. Denies: severe lethargy, SOB, significant cough, vomiting/diarrhea, chest pain with breathing, labored breathing, severe headache, fevers . 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 Past Medical History:   Diagnosis Date    Abnormal Papanicolaou smear of cervix     2013- normal follow up    Headache     Herpes     Type 1--genital    Panic disorder     Psychiatric disorder     depression anxiety        Objective:     Vitals:    04/07/21 1058   Pulse: 87   Resp: 16   Temp: 98 °F (36.7 °C)   SpO2: 99%       Physical Exam  General appearance  appears well hydrated and does not appear toxic, no acute distress  Eyes - EOMs intact. Non injected. No scleral icterus   Ears - no external swelling  Nose - nasal congestion. No purulent drainage  Mouth - OP clear without swelling, exudate or lesion. Mucus membranes moist. Uvula midline.   Neck/Lymphatics  trachea midline, full AROM, no LAD  Chest - Normal breathing effort no wheeze rales, rhonchi or diminishments bilaterally. Heart - RRR, no murmurs  Skin - no observable rashes or pallor  Neurologic- alert and oriented x 3  Psychiatric- normal mood, behavior and though content. Assessment/ Plan:     1. Suspected COVID-19 virus infection    - NOVEL CORONAVIRUS (COVID-19)    Suspect covid given presenting symptoms; other diagnosis possible include viral illness, seasonal allergies  No evidence suggesting complication of illness at this time. Will discharge home with close monitoring and follow up. Supportive home care for mild symptoms advised- maintain adequate fluid intake, lozenges, over the counter Tylenol (for fever, aches, pains, chills), deep breathing exercises  Recommendation for self isolation/quarantine based on current CDC guidelines      Follow up: We have reviewed worsening/concerning signs and symptoms that may warrant seeking immediate care in the ED setting. For other non-severe changes, non-improvement or questions, patient aware to contact PCP office or consider a virtual online medical consultation.         Kerri Lam NP

## 2021-04-07 NOTE — LETTER
NOTIFICATION RETURN TO WORK / SCHOOL 
 
4/7/2021 11:13 AM 
 
Ms. Jasson Armstrong [address] [facility] [address] To Whom It May Concern: 
 
Jasson Armstrong is currently under the care of [employer]. Was tested for COVID-19 today. Results pending. If there are questions or concerns please have the patient contact our office. Sincerely, E PROVIDER

## 2021-04-08 LAB
SARS-COV-2, NAA 2 DAY TAT: NORMAL
SARS-COV-2, NAA: NOT DETECTED

## 2022-03-19 PROBLEM — E66.9 OBESITY (BMI 30-39.9): Status: ACTIVE | Noted: 2018-10-08

## 2022-03-19 PROBLEM — F31.9 BIPOLAR DEPRESSION (HCC): Status: ACTIVE | Noted: 2020-07-24

## 2022-04-13 NOTE — PROGRESS NOTES
Chief Complaint   Patient presents with    Back Pain     left upper back after lifting her dtr today putting her in the car seat    Panic Attack     chest pains, has been to ER for these before    Anxiety     she has stopped her zoloft since 1-2020, increased anxiety and depression    Depression       HISTORY OF PRESENT ILLNESS   HPI  Usual patient of Ricco Jesica presents today w/ multiple complaints and called to get worked in today for evaluation because she didn't want to go to the ER like she has in the past.  She was originally scheduled for \"pulling her back\" after putting her 11 month old baby in her car seat after getting her from  a few hours ago. After she strapped her in and sat down in the 's seat to put on her seatbelt, she felt sharp stabbing pains in her left back/shoulder blade region. It is worse with movement which is what prompted her to realize this was more muscular which is why she came here instead of the ER this time. She rates the discomfort currently a 4/10 but earlier or w/ movement rates it a 5/10. She has not taken anything for the discomfort yet. She denies neck or shoulder pain. She denies paresthesias or weakness. During today's exam she displays a flat affect w/ agitated mood and is very teary intermittently through most of the visit. She has h/o chronic depression, anxiety and panic disorder. She self dc'd her Zoloft about 3 months ago. She had been on 50 mg daily for a while but eventually felt it was ineffective. She saw Dr. Annamarie Dubon a few months ago at which time her dose was increased from 50 to 100 mg and patient states she immediately felt very poorly, felt hyper, racy, and mentally scattered. So after 3 days of taking it, she stopped it altogether \"cold turkey\". She has been off of it since. She has been feeling sad, depressed, anxious and irritable.   Today she had a panic attack (which she has had in the past and over the years) after learning about the flu going around her daughter's . She has been anxious, panicky ever since hearing of that. She states she has been meaning to get in to see her PCP for quite a while now because of so many issues she has going on, but has been putting off. She feels like she needs to be on something for depression, anxiety, panic attacks, but she is currently breastfeeding her 11 month old daughter and is leery of taking anything. She had consulted w/ a functional medicine doctor in the past.   She recalls taking Viibryd in the past which worked well for her, but during and after her pregnancy last year, her OB did not want her taking that which is why Zoloft was recommended instead. She wants to get back on the Viibyrd but is again leery of doing so while nursing. She took Lexapro in the past and states it gave her \"brain zaps\" but cant think of anything else in particular that she has tried. She doesn't want to try something new right now either. She wants to think about it for a while. She also reports recently having some chest pains. Sharp. Non radiating. Non exertional.  No cough, wheeze, pleuritic pain, sob. REVIEW OF SYMPTOMS   Review of Systems   Constitutional: Negative for chills and fever. Respiratory: Negative. Negative for shortness of breath. Cardiovascular: Negative for palpitations. Gastrointestinal: Negative. Musculoskeletal: Negative for neck pain. Neurological: Negative. Psychiatric/Behavioral: Positive for depression. Negative for suicidal ideas. The patient is nervous/anxious.             PROBLEM LIST/MEDICAL HISTORY     Problem List  Date Reviewed: 3/4/2020          Codes Class Noted    Pregnancy ICD-10-CM: Z34.90  ICD-9-CM: V22.2  2019        24 weeks gestation of pregnancy ICD-10-CM: Z3A.24  ICD-9-CM: V22.2  2019    Overview Addendum 2019  2:00 PM by Lazaro Zaragoza LPN     Primary Provider: Maria Ines MCNULTY  EDC by  Pertinents: Transfer from [facility] (Dr. Jeff Teixeira). Records scanned under media. Desires midwife delivery  H/o anxiety, depression and panic disorder- starting Zoloft 25 mg 5/10/19 per pt request  HSV type 1- prophylactic valtrex 36 weeks    IOB labs: wnl  Genetic Screening: Quad neg  Anatomy: wnl. Anterior Placenta, no previa. Gender Secret! GTT: 79  Flu:  TDAP: given  Rhogam: Apos  Third Tri Labs: wnl  GBS: NEG    Pain mgmt. 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 9) ICD-10-CM: E66.9  ICD-9-CM: 278.00  10/8/2018                  PAST SURGICAL HISTORY     Past Surgical History:   Procedure Laterality Date    HX ORTHOPAEDIC  05/2009    rt acl reconstruc    HX OTHER SURGICAL  01/2017    gallstones removal         MEDICATIONS     Current Outpatient Medications   Medication Sig    prenatal vit calc,iron,folic (PRENATAL VITAMIN PO) Take  by mouth.  ondansetron (ZOFRAN ODT) 4 mg disintegrating tablet Take 1 Tab by mouth every eight (8) hours as needed for Nausea or Nausea or Vomiting.  famotidine (PEPCID) 20 mg tablet Take 1 Tab by mouth two (2) times a day. No current facility-administered medications for this visit.            ALLERGIES     Allergies   Allergen Reactions    Ciprofloxacin Rash    Lexapro [Escitalopram] Other (comments)     \"brain zaps\"    Pcn [Penicillins] Itching          SOCIAL HISTORY     Social History     Socioeconomic History    Marital status:      Spouse name: Not on file    Number of children: 1    Years of education: Not on file    Highest education level: Not on file   Occupational History    Occupation: Nurse   Tobacco Use    Smoking status: Former Smoker     Years: 5.00     Last attempt to quit: 06/2016     Years since quitting: 3.7    Smokeless tobacco: Never Used   Substance and Sexual Activity    Alcohol use: Yes    Drug use: Not Currently     Comment: years ago    Sexual activity: Yes     Partners: Male     Birth control/protection: None     Comment: breast feeding   Other Topics Concern   Social History Narrative    Parents are . Found out late in life mother used donor sperm for pregnancy. IMMUNIZATIONS  Immunization History   Administered Date(s) Administered    HPV (Quad) 09/19/2017    Influenza Vaccine 10/01/2018    Influenza Vaccine (Quad) PF 09/19/2019    Tdap 06/21/2019         FAMILY HISTORY     Family History   Problem Relation Age of Onset    Depression Mother     Depression Brother          VITALS     Visit Vitals  /74 (BP 1 Location: Left arm, BP Patient Position: Sitting)   Pulse 91   Temp 98.9 °F (37.2 °C) (Oral)   Resp 16   Ht 5' 5\" (1.651 m)   Wt 178 lb (80.7 kg)   SpO2 98%   Breastfeeding Yes Comment: not having period   BMI 29.62 kg/m²          PHYSICAL EXAMINATION   Physical Exam  Vitals signs reviewed. Constitutional:       General: She is not in acute distress. Appearance: She is not ill-appearing or diaphoretic. Neck:      Musculoskeletal: Normal range of motion. Muscular tenderness present. No spinous process tenderness. Comments: Self reported pulling sensation in left shoulder blade region w/ full fwd neck flexion. Mildly w/ neck extension. Complains of tightness in muscles of neck and shoulders. Cardiovascular:      Rate and Rhythm: Normal rate and regular rhythm. Heart sounds: Normal heart sounds. No murmur. No friction rub. No gallop. Pulmonary:      Effort: Pulmonary effort is normal. No respiratory distress. Breath sounds: Normal breath sounds. No wheezing or rales. Chest:      Chest wall: Tenderness present. Musculoskeletal: Normal range of motion. Thoracic back: She exhibits tenderness. She exhibits normal range of motion and no bony tenderness. Back:       Right lower leg: No edema. Left lower leg: No edema. Comments: Illustration denotes region of mild muscle tenderness. Self reported pulling sensation in this region when she fully flexes cervical spine and when she arches her back. A/P FROM BUE. Proximal and distal muscle strength intact and equal   Skin:     General: Skin is warm and dry. Neurological:      Mental Status: She is alert and oriented to person, place, and time. Motor: Motor function is intact. No abnormal muscle tone. Psychiatric:         Mood and Affect: Mood is depressed. Affect is flat and tearful. Behavior: Behavior is agitated (seems irritated w/ question asking, nurse conveyed same during her interview). Behavior is cooperative. LABORATORY DATA     Results for orders placed or performed during the hospital encounter of 08/20/60   METABOLIC PANEL, COMPREHENSIVE   Result Value Ref Range    Sodium 141 136 - 145 mmol/L    Potassium 3.9 3.5 - 5.1 mmol/L    Chloride 104 97 - 108 mmol/L    CO2 28 21 - 32 mmol/L    Anion gap 9 5 - 15 mmol/L    Glucose 87 65 - 100 mg/dL    BUN 17 6 - 20 MG/DL    Creatinine 0.81 0.55 - 1.02 MG/DL    BUN/Creatinine ratio 21 (H) 12 - 20      GFR est AA >60 >60 ml/min/1.73m2    GFR est non-AA >60 >60 ml/min/1.73m2    Calcium 9.3 8.5 - 10.1 MG/DL    Bilirubin, total 0.3 0.2 - 1.0 MG/DL    ALT (SGPT) 18 12 - 78 U/L    AST (SGOT) 13 (L) 15 - 37 U/L    Alk.  phosphatase 84 45 - 117 U/L    Protein, total 7.9 6.4 - 8.2 g/dL    Albumin 4.1 3.5 - 5.0 g/dL    Globulin 3.8 2.0 - 4.0 g/dL    A-G Ratio 1.1 1.1 - 2.2     CBC WITH AUTOMATED DIFF   Result Value Ref Range    WBC 6.9 3.6 - 11.0 K/uL    RBC 4.28 3.80 - 5.20 M/uL    HGB 13.1 11.5 - 16.0 g/dL    HCT 38.7 35.0 - 47.0 %    MCV 90.4 80.0 - 99.0 FL    MCH 30.6 26.0 - 34.0 PG    MCHC 33.9 30.0 - 36.5 g/dL    RDW 12.2 11.5 - 14.5 %    PLATELET 258 787 - 575 K/uL    MPV 9.7 8.9 - 12.9 FL    NRBC 0.0 0  WBC    ABSOLUTE NRBC 0. 00 0.00 - 0.01 K/uL    NEUTROPHILS 49 32 - 75 %    LYMPHOCYTES 40 12 - 49 %    MONOCYTES 9 5 - 13 %    EOSINOPHILS 2 0 - 7 %    BASOPHILS 0 0 - 1 %    IMMATURE GRANULOCYTES 0 0.0 - 0.5 %    ABS. NEUTROPHILS 3.4 1.8 - 8.0 K/UL    ABS. LYMPHOCYTES 2.8 0.8 - 3.5 K/UL    ABS. MONOCYTES 0.7 0.0 - 1.0 K/UL    ABS. EOSINOPHILS 0.1 0.0 - 0.4 K/UL    ABS. BASOPHILS 0.0 0.0 - 0.1 K/UL    ABS. IMM.  GRANS. 0.0 0.00 - 0.04 K/UL    DF AUTOMATED     URINALYSIS W/ RFLX MICROSCOPIC   Result Value Ref Range    Color YELLOW/STRAW      Appearance CLEAR CLEAR      Specific gravity 1.022 1.003 - 1.030      pH (UA) 6.0 5.0 - 8.0      Protein NEGATIVE  NEG mg/dL    Glucose NEGATIVE  NEG mg/dL    Ketone TRACE (A) NEG mg/dL    Bilirubin NEGATIVE  NEG      Blood NEGATIVE  NEG      Urobilinogen 0.2 0.2 - 1.0 EU/dL    Nitrites NEGATIVE  NEG      Leukocyte Esterase NEGATIVE  NEG     HCG QL SERUM   Result Value Ref Range    HCG, Ql. NEGATIVE  NEG         Lab Results   Component Value Date/Time    TSH 1.910 01/08/2020 09:56 AM      Lab Results   Component Value Date/Time    Sodium 141 01/30/2020 09:45 PM    Potassium 3.9 01/30/2020 09:45 PM    Chloride 104 01/30/2020 09:45 PM    CO2 28 01/30/2020 09:45 PM    Anion gap 9 01/30/2020 09:45 PM    Glucose 87 01/30/2020 09:45 PM    BUN 17 01/30/2020 09:45 PM    Creatinine 0.81 01/30/2020 09:45 PM    BUN/Creatinine ratio 21 (H) 01/30/2020 09:45 PM    GFR est AA >60 01/30/2020 09:45 PM    GFR est non-AA >60 01/30/2020 09:45 PM    Calcium 9.3 01/30/2020 09:45 PM      Lab Results   Component Value Date/Time    Sodium 141 01/30/2020 09:45 PM    Potassium 3.9 01/30/2020 09:45 PM    Chloride 104 01/30/2020 09:45 PM    CO2 28 01/30/2020 09:45 PM    Anion gap 9 01/30/2020 09:45 PM    Glucose 87 01/30/2020 09:45 PM    BUN 17 01/30/2020 09:45 PM    Creatinine 0.81 01/30/2020 09:45 PM    BUN/Creatinine ratio 21 (H) 01/30/2020 09:45 PM    GFR est AA >60 01/30/2020 09:45 PM    GFR est non-AA >60 01/30/2020 09:45 PM Calcium 9.3 01/30/2020 09:45 PM    Bilirubin, total 0.3 01/30/2020 09:45 PM    ALT (SGPT) 18 01/30/2020 09:45 PM    AST (SGOT) 13 (L) 01/30/2020 09:45 PM    Alk. phosphatase 84 01/30/2020 09:45 PM    Protein, total 7.9 01/30/2020 09:45 PM    Albumin 4.1 01/30/2020 09:45 PM    Globulin 3.8 01/30/2020 09:45 PM    A-G Ratio 1.1 01/30/2020 09:45 PM          ASSESSMENT & PLAN       ICD-10-CM ICD-9-CM    1. Strain of thoracic back region S29.012A 847.1    2. Chest pain, non-cardiac R07.89 786.59 AMB POC EKG ROUTINE W/ 12 LEADS, INTER & REP   3. Musculoskeletal pain M79.18 729.1    4. Depression with anxiety F41.8 300.4    5. Panic disorder F41.0 300.01    6. Breast feeding status of mother Z39.1 V24.1      Usual patient of Panfilo Jamison presents today w/ multiple complaints and called to get worked in today for evaluation. For the back strain I have recommended rest, alternating ice/heat as directed, warm epsom salt soak baths, Aleve bid w/ food, resuming her Pepcid bid (which she was taking a few weeks ago prescribed by another provider then self dc'd), gentle back stretching exercises. Discussed potential causes of cardiac vs non cardiac chest pain including musculoskeletal, gi, anxiety vs other. EKG in office today NSR, rate 69, no acute changes. She has had 3 non acute/stable EKG's done in the ER in less than a year, and today's tracing does not show any change from the prior tracings. Reassured chest symptoms at this time appear to be either related to her anxiety/panic attack or more likely musculoskeletal rather than cardiac. But I explained our limited ability in the office to rule this out more definitively or to rule out other causes. She did not want to go to the ER but I discussed w/ her that should any of her symptoms worsen, persist or change in any way or should any other concerning signs/symptoms arise, she should proceed directly to the ER.     She does have h/o depression, anxiety and panic disorder and admittedly self dc'd her Zoloft 3 months ago. She had been on 50 mg daily for a while but eventually felt it was ineffective. She saw Dr. Singh Arbuckle a few months ago at which time her dose was increased from 50 to 100 mg and patient states she immediately felt very poorly, felt hyper, racy, and mentally scattered. So after 3 days of taking it, she stopped it altogether \"cold turkey\". She has been off of it since. She has been feeling sad, depressed, anxious and irritable. Today she had a panic attack. She states she has been meaning to get in to see her PCP for quite a while now because of so many issues she has going on, but has been putting it off. She feels like she needs to be on something for depression, anxiety, panic attacks, but she is currently breastfeeding her 11 month old daughter and is leery of taking anything. She had consulted w/ a functional medicine doctor in the past and she recalls taking Viibryd in the past at their recommendation which worked well for her, but during and after her pregnancy last year, her OB did not want her taking that which is why Zoloft was recommended instead. She wants to get back on the Viibyrd but after we researched and found there is insufficient/ questionable data about safety during lactation, I suggested she not take this for now either. She doesn't want to go back on Zoloft even at a reduced dose and does not want to try something new right now either. She wants to think about it for a while. She is strongly considering weaning from breast feeding sooner rather than later so that she can get back on something asap to get her more stabilized. She wants to think things over. She sees a counselor as well. I encouraged her to follow up w/ her PCP next week for closer follow up on all this. Call back sooner in the interim prn. Biopsy Type: H and E

## 2024-11-19 ENCOUNTER — APPOINTMENT (RX ONLY)
Dept: URBAN - METROPOLITAN AREA CLINIC 35 | Facility: CLINIC | Age: 34
Setting detail: DERMATOLOGY
End: 2024-11-19

## 2024-11-19 DIAGNOSIS — D22 MELANOCYTIC NEVI: ICD-10-CM

## 2024-11-19 DIAGNOSIS — L91.8 OTHER HYPERTROPHIC DISORDERS OF THE SKIN: ICD-10-CM

## 2024-11-19 DIAGNOSIS — D18.0 HEMANGIOMA: ICD-10-CM

## 2024-11-19 PROBLEM — D18.01 HEMANGIOMA OF SKIN AND SUBCUTANEOUS TISSUE: Status: ACTIVE | Noted: 2024-11-19

## 2024-11-19 PROBLEM — D22.5 MELANOCYTIC NEVI OF TRUNK: Status: ACTIVE | Noted: 2024-11-19

## 2024-11-19 PROBLEM — D23.72 OTHER BENIGN NEOPLASM OF SKIN OF LEFT LOWER LIMB, INCLUDING HIP: Status: ACTIVE | Noted: 2024-11-19

## 2024-11-19 PROCEDURE — 99203 OFFICE O/P NEW LOW 30 MIN: CPT

## 2024-11-19 PROCEDURE — ? DEFER

## 2024-11-19 PROCEDURE — ? SUNSCREEN RECOMMENDATIONS

## 2024-11-19 PROCEDURE — ? COUNSELING

## 2024-11-19 PROCEDURE — ? DIAGNOSIS COMMENT

## 2024-11-19 ASSESSMENT — LOCATION DETAILED DESCRIPTION DERM
LOCATION DETAILED: INFERIOR THORACIC SPINE
LOCATION DETAILED: SUPERIOR THORACIC SPINE
LOCATION DETAILED: LEFT LABIUM MAJUS
LOCATION DETAILED: RIGHT AXILLARY TAIL OF BREAST
LOCATION DETAILED: RIGHT LABIUM MAJUS

## 2024-11-19 ASSESSMENT — LOCATION ZONE DERM
LOCATION ZONE: VULVA
LOCATION ZONE: TRUNK

## 2024-11-19 ASSESSMENT — LOCATION SIMPLE DESCRIPTION DERM
LOCATION SIMPLE: LABIA MAJORA
LOCATION SIMPLE: RIGHT BREAST
LOCATION SIMPLE: UPPER BACK

## 2024-11-19 NOTE — HPI: EVALUATION OF SKIN LESION(S)
What Type Of Note Output Would You Prefer (Optional)?: Standard Output
Hpi Title: Evaluation of Skin Lesions
Additional History: Patient presents today for a FBSE. Patient has 1 concern at this time.

## 2024-11-19 NOTE — HPI: SKIN LESION (SKIN TAGS)
Is This A New Presentation, Or A Follow-Up?: Skin Lesion
Additional History: Patient would like to discuss removal of skin tag.

## 2024-11-19 NOTE — PROCEDURE: DIAGNOSIS COMMENT
Render Risk Assessment In Note?: no
Detail Level: Simple
Comment: Discussed if bothersome, cosmetic removal can be done. Patient aware $150 for cryotherapy removal.

## 2025-01-29 NOTE — TELEPHONE ENCOUNTER
Care Transitions Program Week 4 Follow-up Call    Current Issues/Problems reviewed on call:     Call was brief with patient.   Patient reports cough, congestion and mucus are improving but still present.  Has f/u with Pulmonary 1/30/025. Denies any acute distress. No fever or chill reported.  Denies any additional questions or concerns at this time.   Writer will monitor until 30 days and then close Care Transition program if no readmission or concerns arise but happy to assist if needed in the future.      Review of Systems:   Care Transition System Evaluation: weekly  Fever: is not present  General Symptoms: Dizziness  Neurologic/Neuromuscular Symptoms: WDL (absence of symptoms)  ENT Symptoms: WDL (absence of symptoms)  Respiratory Symptoms: Cough; Throat clearing (improving)  Cardiovascular Symptoms: WDL (absence of symptoms)    The patient is taking all medications as prescribed. The patient did not have questions or concerns regarding the medications prescribed.    Transitional Care Management (TCM) appointment was completed.  TCM appointment plan of care and upcoming appointments were reviewed with patient.  Transportation to upcoming appointments to be provided by  transportation company .   Reason for Disposition   [1] MODERATE pain (e.g., interferes with normal activities) AND [2] comes and goes (cramps) AND [3] present > 24 hours  (Exception: pain with Vomiting or Diarrhea - see that Guideline)    Answer Assessment - Initial Assessment Questions  1. LOCATION: \"Where does it hurt? \"       Left side between ribs and belly button   2. RADIATION: \"Does the pain shoot anywhere else? \" (e.g., chest, back)      I guess it affects my whole trunk   3. ONSET: \"When did the pain begin? \" (e.g., minutes, hours or days ago)       1:30am 1/26/2020  4. SUDDEN: \"Gradual or sudden onset? \"      Sudden Onset   5. PATTERN \"Does the pain come and go, or is it constant? \"     - If constant: \"Is it getting better, staying the same, or worsening? \"       (Note: Constant means the pain never goes away completely; most serious pain is constant and it progresses)      - If intermittent: \"How long does it last?\" \"Do you have pain now? \"      (Note: Intermittent means the pain goes away completely between bouts)      Intermittent - comes with eating and drinking   6. SEVERITY: \"How bad is the pain? \"  (e.g., Scale 1-10; mild, moderate, or severe)     - MILD (1-3): doesn't interfere with normal activities, abdomen soft and not tender to touch      - MODERATE (4-7): interferes with normal activities or awakens from sleep, tender to touch      - SEVERE (8-10): excruciating pain, doubled over, unable to do any normal activities        4/10 right now but has been an 8/10   7. RECURRENT SYMPTOM: \"Have you ever had this type of abdominal pain before? \" If so, ask: \"When was the last time? \" and \"What happened that time? \"       Not to this degree   8. AGGRAVATING FACTORS: \"Does anything seem to cause this pain? \" (e.g., foods, stress, alcohol)      Eating and drinking   9. CARDIAC SYMPTOMS: \"Do you have any of the following symptoms: chest pain, difficulty breathing, sweating, nausea? \"      Nausea   10.  OTHER SYMPTOMS: \"Do you have any other symptoms? \" (e.g., fever, vomiting, diarrhea)        Nausea and mildly loose stool. 11. PREGNANCY: \"Is there any chance you are pregnant? \" \"When was your last menstrual period? \"        No, 10/2018 Breast feeding    Protocols used: ABDOMINAL PAIN - UPPER-ADULT-  Caller unsure wether or not to go to ED. Disposition to be seen within 24 hours. Care advise provided and instructed to call us back with worsening pain.